# Patient Record
Sex: FEMALE | Race: OTHER | HISPANIC OR LATINO | URBAN - METROPOLITAN AREA
[De-identification: names, ages, dates, MRNs, and addresses within clinical notes are randomized per-mention and may not be internally consistent; named-entity substitution may affect disease eponyms.]

---

## 2017-09-23 ENCOUNTER — INPATIENT (INPATIENT)
Facility: HOSPITAL | Age: 82
LOS: 2 days | Discharge: HOME | DRG: 149 | End: 2017-09-26
Attending: INTERNAL MEDICINE | Admitting: INTERNAL MEDICINE
Payer: MEDICARE

## 2017-09-23 VITALS
TEMPERATURE: 99 F | SYSTOLIC BLOOD PRESSURE: 145 MMHG | RESPIRATION RATE: 20 BRPM | HEART RATE: 86 BPM | DIASTOLIC BLOOD PRESSURE: 70 MMHG | OXYGEN SATURATION: 96 %

## 2017-09-23 DIAGNOSIS — R42 DIZZINESS AND GIDDINESS: ICD-10-CM

## 2017-09-23 DIAGNOSIS — Z90.710 ACQUIRED ABSENCE OF BOTH CERVIX AND UTERUS: Chronic | ICD-10-CM

## 2017-09-23 DIAGNOSIS — Z90.12 ACQUIRED ABSENCE OF LEFT BREAST AND NIPPLE: Chronic | ICD-10-CM

## 2017-09-23 LAB
ALBUMIN SERPL ELPH-MCNC: 4.5 G/DL — SIGNIFICANT CHANGE UP (ref 3.3–5)
ALP SERPL-CCNC: 71 U/L — SIGNIFICANT CHANGE UP (ref 40–120)
ALT FLD-CCNC: 15 U/L RC — SIGNIFICANT CHANGE UP (ref 10–45)
ANION GAP SERPL CALC-SCNC: 16 MMOL/L — SIGNIFICANT CHANGE UP (ref 5–17)
APPEARANCE UR: CLEAR — SIGNIFICANT CHANGE UP
AST SERPL-CCNC: 16 U/L — SIGNIFICANT CHANGE UP (ref 10–40)
BACTERIA # UR AUTO: ABNORMAL /HPF
BASOPHILS # BLD AUTO: 0 K/UL — SIGNIFICANT CHANGE UP (ref 0–0.2)
BASOPHILS NFR BLD AUTO: 0.3 % — SIGNIFICANT CHANGE UP (ref 0–2)
BILIRUB SERPL-MCNC: 0.3 MG/DL — SIGNIFICANT CHANGE UP (ref 0.2–1.2)
BILIRUB UR-MCNC: NEGATIVE — SIGNIFICANT CHANGE UP
BUN SERPL-MCNC: 18 MG/DL — SIGNIFICANT CHANGE UP (ref 7–23)
CALCIUM SERPL-MCNC: 9.6 MG/DL — SIGNIFICANT CHANGE UP (ref 8.4–10.5)
CHLORIDE SERPL-SCNC: 103 MMOL/L — SIGNIFICANT CHANGE UP (ref 96–108)
CO2 SERPL-SCNC: 23 MMOL/L — SIGNIFICANT CHANGE UP (ref 22–31)
COLOR SPEC: YELLOW — SIGNIFICANT CHANGE UP
CREAT SERPL-MCNC: 0.72 MG/DL — SIGNIFICANT CHANGE UP (ref 0.5–1.3)
DIFF PNL FLD: NEGATIVE — SIGNIFICANT CHANGE UP
EOSINOPHIL # BLD AUTO: 0.3 K/UL — SIGNIFICANT CHANGE UP (ref 0–0.5)
EOSINOPHIL NFR BLD AUTO: 3.6 % — SIGNIFICANT CHANGE UP (ref 0–6)
GLUCOSE SERPL-MCNC: 143 MG/DL — HIGH (ref 70–99)
GLUCOSE UR QL: NEGATIVE — SIGNIFICANT CHANGE UP
HCT VFR BLD CALC: 42.3 % — SIGNIFICANT CHANGE UP (ref 34.5–45)
HGB BLD-MCNC: 14 G/DL — SIGNIFICANT CHANGE UP (ref 11.5–15.5)
KETONES UR-MCNC: NEGATIVE — SIGNIFICANT CHANGE UP
LEUKOCYTE ESTERASE UR-ACNC: ABNORMAL
LYMPHOCYTES # BLD AUTO: 2.2 K/UL — SIGNIFICANT CHANGE UP (ref 1–3.3)
LYMPHOCYTES # BLD AUTO: 24 % — SIGNIFICANT CHANGE UP (ref 13–44)
MCHC RBC-ENTMCNC: 28.9 PG — SIGNIFICANT CHANGE UP (ref 27–34)
MCHC RBC-ENTMCNC: 33.1 GM/DL — SIGNIFICANT CHANGE UP (ref 32–36)
MCV RBC AUTO: 87.3 FL — SIGNIFICANT CHANGE UP (ref 80–100)
MONOCYTES # BLD AUTO: 0.6 K/UL — SIGNIFICANT CHANGE UP (ref 0–0.9)
MONOCYTES NFR BLD AUTO: 7 % — SIGNIFICANT CHANGE UP (ref 2–14)
NEUTROPHILS # BLD AUTO: 5.9 K/UL — SIGNIFICANT CHANGE UP (ref 1.8–7.4)
NEUTROPHILS NFR BLD AUTO: 65.1 % — SIGNIFICANT CHANGE UP (ref 43–77)
NITRITE UR-MCNC: NEGATIVE — SIGNIFICANT CHANGE UP
PH UR: 6 — SIGNIFICANT CHANGE UP (ref 5–8)
PLATELET # BLD AUTO: 303 K/UL — SIGNIFICANT CHANGE UP (ref 150–400)
POTASSIUM SERPL-MCNC: 4.4 MMOL/L — SIGNIFICANT CHANGE UP (ref 3.5–5.3)
POTASSIUM SERPL-SCNC: 4.4 MMOL/L — SIGNIFICANT CHANGE UP (ref 3.5–5.3)
PROT SERPL-MCNC: 7.6 G/DL — SIGNIFICANT CHANGE UP (ref 6–8.3)
PROT UR-MCNC: NEGATIVE — SIGNIFICANT CHANGE UP
RBC # BLD: 4.84 M/UL — SIGNIFICANT CHANGE UP (ref 3.8–5.2)
RBC # FLD: 12.4 % — SIGNIFICANT CHANGE UP (ref 10.3–14.5)
RBC CASTS # UR COMP ASSIST: SIGNIFICANT CHANGE UP /HPF (ref 0–2)
SODIUM SERPL-SCNC: 142 MMOL/L — SIGNIFICANT CHANGE UP (ref 135–145)
SP GR SPEC: 1.01 — SIGNIFICANT CHANGE UP (ref 1.01–1.02)
UROBILINOGEN FLD QL: NEGATIVE — SIGNIFICANT CHANGE UP
WBC # BLD: 9 K/UL — SIGNIFICANT CHANGE UP (ref 3.8–10.5)
WBC # FLD AUTO: 9 K/UL — SIGNIFICANT CHANGE UP (ref 3.8–10.5)
WBC UR QL: SIGNIFICANT CHANGE UP /HPF (ref 0–5)

## 2017-09-23 PROCEDURE — 99285 EMERGENCY DEPT VISIT HI MDM: CPT | Mod: GC

## 2017-09-23 PROCEDURE — 70450 CT HEAD/BRAIN W/O DYE: CPT | Mod: 26

## 2017-09-23 RX ORDER — SODIUM CHLORIDE 9 MG/ML
1000 INJECTION INTRAMUSCULAR; INTRAVENOUS; SUBCUTANEOUS
Qty: 0 | Refills: 0 | Status: DISCONTINUED | OUTPATIENT
Start: 2017-09-23 | End: 2017-09-26

## 2017-09-23 RX ORDER — DEXTROSE 50 % IN WATER 50 %
25 SYRINGE (ML) INTRAVENOUS ONCE
Qty: 0 | Refills: 0 | Status: DISCONTINUED | OUTPATIENT
Start: 2017-09-23 | End: 2017-09-26

## 2017-09-23 RX ORDER — AMLODIPINE BESYLATE 2.5 MG/1
5 TABLET ORAL DAILY
Qty: 0 | Refills: 0 | Status: DISCONTINUED | OUTPATIENT
Start: 2017-09-23 | End: 2017-09-26

## 2017-09-23 RX ORDER — SODIUM CHLORIDE 9 MG/ML
500 INJECTION INTRAMUSCULAR; INTRAVENOUS; SUBCUTANEOUS ONCE
Qty: 0 | Refills: 0 | Status: COMPLETED | OUTPATIENT
Start: 2017-09-23 | End: 2017-09-23

## 2017-09-23 RX ORDER — CLORAZEPATE DIPOTASSIUM 3.75 MG
7.5 TABLET ORAL THREE TIMES A DAY
Qty: 0 | Refills: 0 | Status: DISCONTINUED | OUTPATIENT
Start: 2017-09-23 | End: 2017-09-26

## 2017-09-23 RX ORDER — ONDANSETRON 8 MG/1
4 TABLET, FILM COATED ORAL ONCE
Qty: 0 | Refills: 0 | Status: COMPLETED | OUTPATIENT
Start: 2017-09-23 | End: 2017-09-23

## 2017-09-23 RX ORDER — HEPARIN SODIUM 5000 [USP'U]/ML
5000 INJECTION INTRAVENOUS; SUBCUTANEOUS EVERY 12 HOURS
Qty: 0 | Refills: 0 | Status: DISCONTINUED | OUTPATIENT
Start: 2017-09-23 | End: 2017-09-26

## 2017-09-23 RX ORDER — ASPIRIN/CALCIUM CARB/MAGNESIUM 324 MG
81 TABLET ORAL DAILY
Qty: 0 | Refills: 0 | Status: DISCONTINUED | OUTPATIENT
Start: 2017-09-23 | End: 2017-09-26

## 2017-09-23 RX ORDER — CEFTRIAXONE 500 MG/1
1 INJECTION, POWDER, FOR SOLUTION INTRAMUSCULAR; INTRAVENOUS ONCE
Qty: 0 | Refills: 0 | Status: COMPLETED | OUTPATIENT
Start: 2017-09-23 | End: 2017-09-23

## 2017-09-23 RX ORDER — INFLUENZA VIRUS VACCINE 15; 15; 15; 15 UG/.5ML; UG/.5ML; UG/.5ML; UG/.5ML
0.5 SUSPENSION INTRAMUSCULAR ONCE
Qty: 0 | Refills: 0 | Status: DISCONTINUED | OUTPATIENT
Start: 2017-09-23 | End: 2017-09-26

## 2017-09-23 RX ORDER — INSULIN LISPRO 100/ML
VIAL (ML) SUBCUTANEOUS
Qty: 0 | Refills: 0 | Status: DISCONTINUED | OUTPATIENT
Start: 2017-09-23 | End: 2017-09-26

## 2017-09-23 RX ORDER — SIMVASTATIN 20 MG/1
20 TABLET, FILM COATED ORAL AT BEDTIME
Qty: 0 | Refills: 0 | Status: DISCONTINUED | OUTPATIENT
Start: 2017-09-23 | End: 2017-09-26

## 2017-09-23 RX ORDER — LISINOPRIL 2.5 MG/1
5 TABLET ORAL DAILY
Qty: 0 | Refills: 0 | Status: DISCONTINUED | OUTPATIENT
Start: 2017-09-23 | End: 2017-09-26

## 2017-09-23 RX ORDER — MECLIZINE HCL 12.5 MG
12.5 TABLET ORAL ONCE
Qty: 0 | Refills: 0 | Status: COMPLETED | OUTPATIENT
Start: 2017-09-23 | End: 2017-09-23

## 2017-09-23 RX ORDER — SODIUM CHLORIDE 9 MG/ML
1000 INJECTION, SOLUTION INTRAVENOUS
Qty: 0 | Refills: 0 | Status: DISCONTINUED | OUTPATIENT
Start: 2017-09-23 | End: 2017-09-26

## 2017-09-23 RX ORDER — GLUCAGON INJECTION, SOLUTION 0.5 MG/.1ML
1 INJECTION, SOLUTION SUBCUTANEOUS ONCE
Qty: 0 | Refills: 0 | Status: DISCONTINUED | OUTPATIENT
Start: 2017-09-23 | End: 2017-09-26

## 2017-09-23 RX ORDER — MECLIZINE HCL 12.5 MG
25 TABLET ORAL DAILY
Qty: 0 | Refills: 0 | Status: DISCONTINUED | OUTPATIENT
Start: 2017-09-23 | End: 2017-09-26

## 2017-09-23 RX ORDER — DEXTROSE 50 % IN WATER 50 %
12.5 SYRINGE (ML) INTRAVENOUS ONCE
Qty: 0 | Refills: 0 | Status: DISCONTINUED | OUTPATIENT
Start: 2017-09-23 | End: 2017-09-26

## 2017-09-23 RX ORDER — DEXTROSE 50 % IN WATER 50 %
1 SYRINGE (ML) INTRAVENOUS ONCE
Qty: 0 | Refills: 0 | Status: DISCONTINUED | OUTPATIENT
Start: 2017-09-23 | End: 2017-09-26

## 2017-09-23 RX ADMIN — SIMVASTATIN 20 MILLIGRAM(S): 20 TABLET, FILM COATED ORAL at 21:04

## 2017-09-23 RX ADMIN — SODIUM CHLORIDE 500 MILLILITER(S): 9 INJECTION INTRAMUSCULAR; INTRAVENOUS; SUBCUTANEOUS at 12:21

## 2017-09-23 RX ADMIN — Medication 7.5 MILLIGRAM(S): at 21:56

## 2017-09-23 RX ADMIN — Medication 12.5 MILLIGRAM(S): at 12:16

## 2017-09-23 RX ADMIN — CEFTRIAXONE 100 GRAM(S): 500 INJECTION, POWDER, FOR SOLUTION INTRAMUSCULAR; INTRAVENOUS at 21:05

## 2017-09-23 NOTE — ED PROVIDER NOTE - ATTENDING CONTRIBUTION TO CARE
Private Physician Justyna Rizo ([place])  87y female pmh DM,HTN,HLD,Cancer breast sp lumpectomy/chemo/rt 1999, Pt comes to ed complains of chronic dizzyness  for 2y on antivert. Two days ago worsened with off balnace. No nausea and vomiting, fever chills. cough, trauma, decreased vison.  Now complains of diff walking. PE WDWN  NAD normocephalic atraumatic no nsytagmus, neck supple chest clear anterior & posterior abd soft +bs, neruo no focal defects. cn2-12 intact. pain light touch intact power 5/5 all extr  Jorge Hernandes MD, Facep

## 2017-09-23 NOTE — CONSULT NOTE ADULT - ASSESSMENT
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 She comes into the ED due to dizziness and unsteady gait. She has a history of vertigo like symptoms and dizziness for which she takes meclizine. Over the past three days the meclizine has been unable to control her symptoms. She reports tinnitus for months, veritigo for the past month and feeling unsteady for the past three days. On physical exam, the pt is unsteady on her feet (she uses a walker however left it at home). There are no other focal findings except decreased hearing in the left hear. Denies double vision, drop attacks. Labs are WNL except urinalysis positive for leukocyte esterase, few bacteria and 11-25 wbcs. CT head is negative for any acute findings (no changes since 2012). DDX - Vertigo exacerbated by UTI vs vertebrobasilar ischemia (less likely as there are no other focal findings, and deficits, also pt does not have overt vertiginous signs "room spinning"). Given pt's history of breat cancer and age she is at risk for CVA.     Recommendations:  If pt improves on IVF, Abx and meclizine and she is able to ambulate - she can be DC'ed and follow up for outpatient head and neck imaging this week in the outpatient neurology clinic.    If no improvement, pt can be observed in CDU for MRI brain and MRA head and neck.    If pt is admitted would get MRI brain without, MRA head without and MRA neck with blanka while inpatient.   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 She comes into the ED due to dizziness and unsteady gait. She has a history of vertigo like symptoms and dizziness for which she takes meclizine. Over the past three days the meclizine has been unable to control her symptoms. She reports tinnitus for months, veritigo for the past month and feeling unsteady for the past three days. On physical exam, the pt is unsteady on her feet (she uses a walker however left it at home). There are no other focal findings except decreased hearing in the left hear. Denies double vision, drop attacks. Labs are WNL except urinalysis positive for leukocyte esterase, few bacteria and 11-25 wbcs. CT head is negative for any acute findings (no changes since 2012). DDX - Vertigo exacerbated by UTI vs vertebrobasilar ischemia (less likely as there are no other focal findings, and deficits, also pt does not have overt vertiginous signs "room spinning"). However, given pt's history of breast cancer and age she is at risk for CVA.     Recommendations:  If pt improves on IVF, Abx and meclizine and she is able to ambulate - she can be DC'ed and follow up for outpatient head and neck imaging this week in the outpatient neurology clinic.    If no improvement, pt can be observed in CDU for MRI brain and MRA head and neck.    If pt is admitted would get MRI brain without, MRA head without and MRA neck with blanka while inpatient.   Abx for UTI, Meclizine PRN, IVF  Fall precautions

## 2017-09-23 NOTE — ED PROVIDER NOTE - PROGRESS NOTE DETAILS
Endorsed To Dr Damien Hernandes MD, Facep attending Damien: Received signout from Dr. Hernandes. Pt with dizziness and inability to ambulate. Pending neurology evaluation. Neuro susp UTI has exacerbated chronic vertigo, does not susp brain ischemia

## 2017-09-23 NOTE — ED PROVIDER NOTE - OBJECTIVE STATEMENT
This patient is a 87y female w PMHx breast cancer with full remission and DM on metformin, presenting with 3 days of dizziness. She states that she has been unable to keep her balance while standing or sitting for the last 3 days. She has also had tinnitus bilaterally for the last several months. This patient is a 87y female w PMHx breast cancer with full remission and DM on metformin, presenting with 3 days of dizziness. She states that she has been unable to keep her balance while standing or sitting for the last 3 days. She has also had tinnitus bilaterally for the last several months. She has been slightly nauseous with her imbalance but has not had any emesis. This patient is a 87y female w PMHx breast cancer with full remission, DM on metformin, and chronic vertigo usually controlled with antivert, now presenting with 3 days of severe dizziness. She states that she has been unable to keep her balance while standing or sitting for the last 3 days. She has also had tinnitus bilaterally for the last several months. She has been slightly nauseous with her imbalance but has not had any emesis.

## 2017-09-23 NOTE — H&P ADULT - HISTORY OF PRESENT ILLNESS
This patient is a 87y female w PMHx breast cancer with full remission, and chronic vertigo usually controlled with antivert, now presenting with 3 days of severe dizziness. She states that she has been unable to keep her balance while standing or sitting for the last 3 days. She has also had tinnitus bilaterally for the last several months. She has had  nausea/no vomiting  ?dysuria couple of days ago

## 2017-09-23 NOTE — ED ADULT NURSE NOTE - PMH
Breast cancer    Diabetes  type II  Hyperlipidemia    Hypertension    Metastatic carcinoma    Pulmonary embolism  2012  Tuberculosis  1956

## 2017-09-23 NOTE — ED ADULT NURSE NOTE - CHPI ED SYMPTOMS NEG
no chills/no decreased eating/drinking/no weakness/no fever/no tingling/no nausea/no numbness/no vomiting/no pain

## 2017-09-23 NOTE — CONSULT NOTE ADULT - SUBJECTIVE AND OBJECTIVE BOX
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 She states that she has been feeling dizzy and unable to keep her balance while walking with a walker.  She described the dizziness as feeling off balance, denies room spinning sensation. She has a long standing history of vertigo like symptoms for which she takes meclizine. She has had to take Meclizine every day for the past month. Over the last three days the meclizine has not been able to control her symptoms therefore she came into the ED.     ROS - positive for tinnitus bilaterally occurring for the last several months), left arm and shoulder pain (2/2 surgery). Denies vision changes, drop attacks, sensory changes, weakness.     Denies – SOB, CP, palpitations, headache, weakness, numbness, vomiting, fever/chills and urinary changes    Vital Signs Last 24 Hrs  T(C): 36.7 (23 Sep 2017 15:35), Max: 37.2 (23 Sep 2017 11:03)  T(F): 98 (23 Sep 2017 15:35), Max: 98.9 (23 Sep 2017 11:03)  HR: 74 (23 Sep 2017 15:35) (74 - 86)  BP: 142/72 (23 Sep 2017 15:35) (142/72 - 152/89)  BP(mean): --  RR: 18 (23 Sep 2017 15:35) (18 - 20)  SpO2: 99% (23 Sep 2017 15:35) (96% - 99%)    Physical Exam  General – Lying in bed, tremors on the moth and eyelids, NAD  Neck is soft and supple, no bruits   Heart - S1S2 present  Neurological:  MS – AAOX3, attention in tact, no word finding difficulties, naming in tact  CN – EOMI, PERRL, decreased hearing in the left ear, no nystagmus in any direction, face symmetric, uvula midline  Motor – 4+/5 for given age (no lateralizing weakness) throughout  Sensory – no sensory deficits throughout, light touch and temperature in tat throughout   Coordination – FTN in tact BL, HTS in tact BL.   Gait – at baseline pt’s uses a walker – gait is unsteady and hesitant.    Labs                      14.0   9.0   )-----------( 303      ( 23 Sep 2017 12:02 )             42.3   09-23    142  |  103  |  18  ----------------------------<  143<H>  4.4   |  23  |  0.72    Ca    9.6      23 Sep 2017 12:02    TPro  7.6  /  Alb  4.5  /  TBili  0.3  /  DBili  x   /  AST  16  /  ALT  15  /  AlkPhos  71  09-23    Imaging:  CT head - No acute intracranial hemorrhage, mass effect or acute territorial infarction. Unchanged brain CT when compared to 10/20/2012.

## 2017-09-23 NOTE — H&P ADULT - NSHPLABSRESULTS_GEN_ALL_CORE
14.0   9.0   )-----------( 303      ( 23 Sep 2017 12:02 )             42.3           142  |  103  |  18  ----------------------------<  143<H>  4.4   |  23  |  0.72    Ca    9.6      23 Sep 2017 12:02    TPro  7.6  /  Alb  4.5  /  TBili  0.3  /  DBili  x   /  AST  16  /  ALT  15  /  AlkPhos  71                Urinalysis Basic - ( 23 Sep 2017 12:28 )    Color: Yellow / Appearance: Clear / S.010 / pH: x  Gluc: x / Ketone: Negative  / Bili: Negative / Urobili: Negative   Blood: x / Protein: Negative / Nitrite: Negative   Leuk Esterase: Moderate / RBC: 0-2 /HPF / WBC 11-25 /HPF   Sq Epi: x / Non Sq Epi: x / Bacteria: Few /HPF            Lactate Trend            CAPILLARY BLOOD GLUCOSE  138 (23 Sep 2017 11:57)

## 2017-09-23 NOTE — ED PROVIDER NOTE - MEDICAL DECISION MAKING DETAILS
87y female with PMHx DM, presenting with 3 days of vertigo ddx electrolyte abnormality vs meniere's disease vs BPPV. BPPV less likely, story doesn't fit. Glucose 137 so hypoglycemia unlikely. Will get labs. 87y female with PMHx DM, presenting with 3 days of vertigo ddx electrolyte abnormality vs meniere's disease vs BPPV. BPPV less likely, story doesn't fit. Glucose 137 so hypoglycemia unlikely. Will get labs.  Neuro does not susp intracranial vascular origin, head ct normal, UTI on labs, susp this exacerbated vertigo. Will admit to Dr. Martines due to inability to ambulate.

## 2017-09-23 NOTE — ED ADULT NURSE NOTE - OBJECTIVE STATEMENT
87 year old female presented to ED with c/o of dizziness since Thursday. Pt fell on Thursday because of dizziness, did not fall to the floor. Pt did not hit head, no LOC, no neck pain. Pt denies pain. Pt states 'I have ringing in my ears'. Pt has hx of breast cancer and diabetes. Pt denies CP, SOB, nausea/vomiting, numbness/tingling, fever, cough, chills, headache. Pt a&ox3, lung sounds clear, heart rate regular, abdomen soft nontender nondistended to palp. Skin intact. IV in right AC 20G and patent. Pt currently resting in bed with daughter at bedside. Will continue to monitor and assess while offering support and reassurance.

## 2017-09-23 NOTE — H&P ADULT - ASSESSMENT
pt w/ worsening dizziness /  vertigo  neuro eval noted  mri/ mra ordered  iv fluid   check urine culture   id eval   pt  dvt proph

## 2017-09-24 DIAGNOSIS — R82.90 UNSPECIFIED ABNORMAL FINDINGS IN URINE: ICD-10-CM

## 2017-09-24 DIAGNOSIS — R42 DIZZINESS AND GIDDINESS: ICD-10-CM

## 2017-09-24 LAB
HBA1C BLD-MCNC: 7.6 % — HIGH (ref 4–5.6)
TSH SERPL-MCNC: 3.54 UIU/ML — SIGNIFICANT CHANGE UP (ref 0.27–4.2)

## 2017-09-24 PROCEDURE — 99222 1ST HOSP IP/OBS MODERATE 55: CPT

## 2017-09-24 RX ADMIN — Medication 25 MILLIGRAM(S): at 11:43

## 2017-09-24 RX ADMIN — Medication 7.5 MILLIGRAM(S): at 05:14

## 2017-09-24 RX ADMIN — Medication 81 MILLIGRAM(S): at 11:49

## 2017-09-24 RX ADMIN — Medication 7.5 MILLIGRAM(S): at 21:20

## 2017-09-24 RX ADMIN — SODIUM CHLORIDE 75 MILLILITER(S): 9 INJECTION INTRAMUSCULAR; INTRAVENOUS; SUBCUTANEOUS at 11:43

## 2017-09-24 RX ADMIN — HEPARIN SODIUM 5000 UNIT(S): 5000 INJECTION INTRAVENOUS; SUBCUTANEOUS at 17:03

## 2017-09-24 RX ADMIN — SIMVASTATIN 20 MILLIGRAM(S): 20 TABLET, FILM COATED ORAL at 21:22

## 2017-09-24 RX ADMIN — Medication 2: at 11:52

## 2017-09-24 RX ADMIN — AMLODIPINE BESYLATE 5 MILLIGRAM(S): 2.5 TABLET ORAL at 05:13

## 2017-09-24 RX ADMIN — HEPARIN SODIUM 5000 UNIT(S): 5000 INJECTION INTRAVENOUS; SUBCUTANEOUS at 05:14

## 2017-09-24 RX ADMIN — SODIUM CHLORIDE 75 MILLILITER(S): 9 INJECTION INTRAMUSCULAR; INTRAVENOUS; SUBCUTANEOUS at 17:02

## 2017-09-24 RX ADMIN — LISINOPRIL 5 MILLIGRAM(S): 2.5 TABLET ORAL at 05:13

## 2017-09-24 NOTE — PROGRESS NOTE ADULT - SUBJECTIVE AND OBJECTIVE BOX
CHIEF COMPLAINT:Patient without new complaints         PAST MEDICAL & SURGICAL HISTORY:  Pulmonary embolism: 2012  Tuberculosis: 1956  Hyperlipidemia  Diabetes: type II  Hypertension  Metastatic carcinoma  Breast cancer  H/O left mastectomy  H/O abdominal hysterectomy          REVIEW OF SYSTEMS:  CONSTITUTIONAL: No fever, weight loss, or fatigue  EYES: No eye pain, visual disturbances, or discharge  NECK: No pain or stiffness  RESPIRATORY: No cough, wheezing, chills or hemoptysis; No Shortness of Breath  CARDIOVASCULAR: No chest pain, palpitations,   GASTROINTESTINAL: No abdominal or epigastric pain. No nausea, vomiting, or hematemesis; No diarrhea or constipation. No melena or hematochezia.  GENITOURINARY: No dysuria, frequency, hematuria, or incontinence at present   NEUROLOGICAL: No headaches, memory loss, loss of strength, numbness, or tremors/ + dizziness  SKIN: No itching, burning, rashes, or lesions       Medications:  MEDICATIONS  (STANDING):  aspirin  chewable 81 milliGRAM(s) Oral daily  meclizine 25 milliGRAM(s) Oral daily  simvastatin 20 milliGRAM(s) Oral at bedtime  clorazepate 7.5 milliGRAM(s) Oral three times a day  amLODIPine   Tablet 5 milliGRAM(s) Oral daily  lisinopril 5 milliGRAM(s) Oral daily  sodium chloride 0.9%. 1000 milliLiter(s) (75 mL/Hr) IV Continuous <Continuous>  influenza   Vaccine 0.5 milliLiter(s) IntraMuscular once  insulin lispro (HumaLOG) corrective regimen sliding scale   SubCutaneous three times a day before meals  dextrose 5%. 1000 milliLiter(s) (50 mL/Hr) IV Continuous <Continuous>  dextrose 50% Injectable 12.5 Gram(s) IV Push once  dextrose 50% Injectable 25 Gram(s) IV Push once  dextrose 50% Injectable 25 Gram(s) IV Push once  heparin  Injectable 5000 Unit(s) SubCutaneous every 12 hours    MEDICATIONS  (PRN):  dextrose Gel 1 Dose(s) Oral once PRN Blood Glucose LESS THAN 70 milliGRAM(s)/deciliter  glucagon  Injectable 1 milliGRAM(s) IntraMuscular once PRN Glucose LESS THAN 70 milligrams/deciliter    	    PHYSICAL EXAM:  T(C): 36.6 (09-24-17 @ 07:50), Max: 37.2 (09-23-17 @ 11:03)  HR: 73 (09-24-17 @ 07:50) (71 - 86)  BP: 139/68 (09-24-17 @ 07:50) (138/70 - 168/72)  RR: 18 (09-24-17 @ 07:50) (18 - 20)  SpO2: 96% (09-24-17 @ 07:50) (94% - 99%)  Wt(kg): --  I&O's Summary      Appearance: Normal	  HEENT:   Normal oral mucosa, PERRL, EOMI	  Lymphatic: No lymphadenopathy  Cardiovascular: Normal S1 S2, No JVD, No murmurs, No edema  Respiratory: Lungs clear to auscultation	  Psychiatry: A & O x 3, Mood & affect appropriate  Gastrointestinal:  Soft, Non-tender, + BS	  Skin: No rashes, No ecchymoses, No cyanosis	  Neurologic: Non-focal  Extremities: Normal range of motion, No clubbing, cyanosis or edema  Vascular: Peripheral pulses palpable 2+ bilaterally    TELEMETRY: 	    ECG:  	  RADIOLOGY:  OTHER: 	  	  LABS:	 	    CARDIAC MARKERS:                                14.0   9.0   )-----------( 303      ( 23 Sep 2017 12:02 )             42.3     09-23    142  |  103  |  18  ----------------------------<  143<H>  4.4   |  23  |  0.72    Ca    9.6      23 Sep 2017 12:02    TPro  7.6  /  Alb  4.5  /  TBili  0.3  /  DBili  x   /  AST  16  /  ALT  15  /  AlkPhos  71  09-23    proBNP:   Lipid Profile:   HgA1c:   TSH:

## 2017-09-24 NOTE — CONSULT NOTE ADULT - ATTENDING COMMENTS
Darwin Hernandez  Attending Physician   Division of Infectious Disease  Pager #[phone]  After 5pm/weekend #[phone]

## 2017-09-24 NOTE — PROGRESS NOTE ADULT - SUBJECTIVE AND OBJECTIVE BOX
Neurology Attending  see resident note for details, agree with hx,physical and plan as outlined, case d/w resident. Pt with dizziness, persistent, nonfocal neuro exam  Plan  1 MRI brain  2. PT

## 2017-09-24 NOTE — CONSULT NOTE ADULT - SUBJECTIVE AND OBJECTIVE BOX
OLU DUTTA 87y Female  [id number]    Patient is a 87y old  Female who presents with a chief complaint of     HPI:  This patient is a 87y female w PMHx breast cancer with full remission, and chronic vertigo usually controlled with antivert, now presenting with 3 days of severe dizziness. She states that she has been unable to keep her balance while standing or sitting for the last 3 days. She has also had tinnitus bilaterally for the last several months. She has had  nausea/no vomiting  ?dysuria couple of days ago (23 Sep 2017 19:24)      PAST MEDICAL & SURGICAL HISTORY:  Pulmonary embolism:   Tuberculosis:   Hyperlipidemia  Diabetes: type II  Hypertension  Metastatic carcinoma  Breast cancer  H/O left mastectomy  H/O abdominal hysterectomy      Allergies    No Known Allergies    Intolerances        ANTIMICROBIALS:      MEDICATIONS  (STANDING):  aspirin  chewable 81 milliGRAM(s) Oral daily  meclizine 25 milliGRAM(s) Oral daily  simvastatin 20 milliGRAM(s) Oral at bedtime  clorazepate 7.5 milliGRAM(s) Oral three times a day  amLODIPine   Tablet 5 milliGRAM(s) Oral daily  lisinopril 5 milliGRAM(s) Oral daily  sodium chloride 0.9%. 1000 milliLiter(s) (75 mL/Hr) IV Continuous <Continuous>  influenza   Vaccine 0.5 milliLiter(s) IntraMuscular once  insulin lispro (HumaLOG) corrective regimen sliding scale   SubCutaneous three times a day before meals  dextrose 5%. 1000 milliLiter(s) (50 mL/Hr) IV Continuous <Continuous>  dextrose 50% Injectable 12.5 Gram(s) IV Push once  dextrose 50% Injectable 25 Gram(s) IV Push once  dextrose 50% Injectable 25 Gram(s) IV Push once  heparin  Injectable 5000 Unit(s) SubCutaneous every 12 hours      Social History  Smoking:  Etoh:  Drug use:      FAMILY HISTORY:  No pertinent family history in first degree relatives      Vital Signs Last 24 Hrs  T(C): 36.6 (24 Sep 2017 01:56), Max: 37.2 (23 Sep 2017 11:03)  T(F): 97.9 (24 Sep 2017 01:56), Max: 98.9 (23 Sep 2017 11:03)  HR: 80 (24 Sep 2017 01:56) (71 - 86)  BP: 168/72 (24 Sep 2017 01:56) (138/70 - 168/72)  BP(mean): --  RR: 18 (24 Sep 2017 01:56) (18 - 20)  SpO2: 96% (24 Sep 2017 01:56) (94% - 99%)    CBC Full  -  ( 23 Sep 2017 12:02 )  WBC Count : 9.0 K/uL  Hemoglobin : 14.0 g/dL  Hematocrit : 42.3 %  Platelet Count - Automated : 303 K/uL  Mean Cell Volume : 87.3 fl  Mean Cell Hemoglobin : 28.9 pg  Mean Cell Hemoglobin Concentration : 33.1 gm/dL  Auto Neutrophil # : 5.9 K/uL  Auto Lymphocyte # : 2.2 K/uL  Auto Monocyte # : 0.6 K/uL  Auto Eosinophil # : 0.3 K/uL  Auto Basophil # : 0.0 K/uL  Auto Neutrophil % : 65.1 %  Auto Lymphocyte % : 24.0 %  Auto Monocyte % : 7.0 %  Auto Eosinophil % : 3.6 %  Auto Basophil % : 0.3 %        142  |  103  |  18  ----------------------------<  143<H>  4.4   |  23  |  0.72    Ca    9.6      23 Sep 2017 12:02    TPro  7.6  /  Alb  4.5  /  TBili  0.3  /  DBili  x   /  AST  16  /  ALT  15  /  AlkPhos  71      LIVER FUNCTIONS - ( 23 Sep 2017 12:02 )  Alb: 4.5 g/dL / Pro: 7.6 g/dL / ALK PHOS: 71 U/L / ALT: 15 U/L RC / AST: 16 U/L / GGT: x           Urinalysis Basic - ( 23 Sep 2017 12:28 )    Color: Yellow / Appearance: Clear / S.010 / pH: x  Gluc: x / Ketone: Negative  / Bili: Negative / Urobili: Negative   Blood: x / Protein: Negative / Nitrite: Negative   Leuk Esterase: Moderate / RBC: 0-2 /HPF / WBC 11-25 /HPF   Sq Epi: x / Non Sq Epi: x / Bacteria: Few /HPF        MICROBIOLOGY:          v            RADIOLOGY    CT Head No Cont (17 @ 14:05) >  No acute intracranial hemorrhage, mass effect or acute territorial   infarction. Unchanged brain CT when compared to 10/20/2012. DUTTALENOLU 87y Female  MRN-[id number]    Patient is a 87y old  Female who presents with a chief complaint of     HPI:  This patient is a 87y female w PMHx breast cancer with full remission, and chronic vertigo usually controlled with antivert, now presenting with 3 days of severe dizziness. She states that she has been unable to keep her balance while standing or sitting for the last 3 days. She has also had tinnitus bilaterally for the last several months. She has had  nausea/no vomiting  ?dysuria couple of days ago (23 Sep 2017 19:24)    c/o dizziness when getting up    No dysuria or hematuria      PAST MEDICAL & SURGICAL HISTORY:  Pulmonary embolism:   Tuberculosis:   Hyperlipidemia  Diabetes: type II  Hypertension  Metastatic carcinoma  Breast cancer  H/O left mastectomy  H/O abdominal hysterectomy      Allergies    No Known Allergies    Intolerances        ANTIMICROBIALS:  CTX x 1 dose given    MEDICATIONS  (STANDING):  aspirin  chewable 81 milliGRAM(s) Oral daily  meclizine 25 milliGRAM(s) Oral daily  simvastatin 20 milliGRAM(s) Oral at bedtime  clorazepate 7.5 milliGRAM(s) Oral three times a day  amLODIPine   Tablet 5 milliGRAM(s) Oral daily  lisinopril 5 milliGRAM(s) Oral daily  sodium chloride 0.9%. 1000 milliLiter(s) (75 mL/Hr) IV Continuous <Continuous>  influenza   Vaccine 0.5 milliLiter(s) IntraMuscular once  insulin lispro (HumaLOG) corrective regimen sliding scale   SubCutaneous three times a day before meals  dextrose 5%. 1000 milliLiter(s) (50 mL/Hr) IV Continuous <Continuous>  dextrose 50% Injectable 12.5 Gram(s) IV Push once  dextrose 50% Injectable 25 Gram(s) IV Push once  dextrose 50% Injectable 25 Gram(s) IV Push once  heparin  Injectable 5000 Unit(s) SubCutaneous every 12 hours      Social History  Smoking: no  Etoh: none  Drug use: no  lives with daughter at home      FAMILY HISTORY:  No pertinent family history in first degree relatives      Vital Signs Last 24 Hrs  T(C): 36.6 (24 Sep 2017 01:56), Max: 37.2 (23 Sep 2017 11:03)  T(F): 97.9 (24 Sep 2017 01:56), Max: 98.9 (23 Sep 2017 11:03)  HR: 80 (24 Sep 2017 01:56) (71 - 86)  BP: 168/72 (24 Sep 2017 01:56) (138/70 - 168/72)  BP(mean): --  RR: 18 (24 Sep 2017 01:56) (18 - 20)  SpO2: 96% (24 Sep 2017 01:56) (94% - 99%)    CBC Full  -  ( 23 Sep 2017 12:02 )  WBC Count : 9.0 K/uL  Hemoglobin : 14.0 g/dL  Hematocrit : 42.3 %  Platelet Count - Automated : 303 K/uL  Mean Cell Volume : 87.3 fl  Mean Cell Hemoglobin : 28.9 pg  Mean Cell Hemoglobin Concentration : 33.1 gm/dL  Auto Neutrophil # : 5.9 K/uL  Auto Lymphocyte # : 2.2 K/uL  Auto Monocyte # : 0.6 K/uL  Auto Eosinophil # : 0.3 K/uL  Auto Basophil # : 0.0 K/uL  Auto Neutrophil % : 65.1 %  Auto Lymphocyte % : 24.0 %  Auto Monocyte % : 7.0 %  Auto Eosinophil % : 3.6 %  Auto Basophil % : 0.3 %        142  |  103  |  18  ----------------------------<  143<H>  4.4   |  23  |  0.72    Ca    9.6      23 Sep 2017 12:02    TPro  7.6  /  Alb  4.5  /  TBili  0.3  /  DBili  x   /  AST  16  /  ALT  15  /  AlkPhos  71  0923    LIVER FUNCTIONS - ( 23 Sep 2017 12:02 )  Alb: 4.5 g/dL / Pro: 7.6 g/dL / ALK PHOS: 71 U/L / ALT: 15 U/L RC / AST: 16 U/L / GGT: x           Urinalysis Basic - ( 23 Sep 2017 12:28 )    Color: Yellow / Appearance: Clear / S.010 / pH: x  Gluc: x / Ketone: Negative  / Bili: Negative / Urobili: Negative   Blood: x / Protein: Negative / Nitrite: Negative   Leuk Esterase: Moderate / RBC: 0-2 /HPF / WBC 11-25 /HPF   Sq Epi: x / Non Sq Epi: x / Bacteria: Few /HPF        MICROBIOLOGY:      RADIOLOGY    CT Head No Cont (17 @ 14:05) >  No acute intracranial hemorrhage, mass effect or acute territorial   infarction. Unchanged brain CT when compared to 10/20/2012.

## 2017-09-25 LAB
CULTURE RESULTS: NO GROWTH — SIGNIFICANT CHANGE UP
SPECIMEN SOURCE: SIGNIFICANT CHANGE UP
TROPONIN T SERPL-MCNC: <0.01 NG/ML — SIGNIFICANT CHANGE UP (ref 0–0.06)

## 2017-09-25 PROCEDURE — 99232 SBSQ HOSP IP/OBS MODERATE 35: CPT

## 2017-09-25 RX ADMIN — Medication 25 MILLIGRAM(S): at 11:46

## 2017-09-25 RX ADMIN — Medication 2: at 09:24

## 2017-09-25 RX ADMIN — LISINOPRIL 5 MILLIGRAM(S): 2.5 TABLET ORAL at 06:29

## 2017-09-25 RX ADMIN — Medication 2: at 18:03

## 2017-09-25 RX ADMIN — HEPARIN SODIUM 5000 UNIT(S): 5000 INJECTION INTRAVENOUS; SUBCUTANEOUS at 18:03

## 2017-09-25 RX ADMIN — SIMVASTATIN 20 MILLIGRAM(S): 20 TABLET, FILM COATED ORAL at 20:26

## 2017-09-25 RX ADMIN — AMLODIPINE BESYLATE 5 MILLIGRAM(S): 2.5 TABLET ORAL at 06:29

## 2017-09-25 RX ADMIN — Medication 81 MILLIGRAM(S): at 11:46

## 2017-09-25 RX ADMIN — HEPARIN SODIUM 5000 UNIT(S): 5000 INJECTION INTRAVENOUS; SUBCUTANEOUS at 06:30

## 2017-09-25 NOTE — PHYSICAL THERAPY INITIAL EVALUATION ADULT - ADDITIONAL COMMENTS
pt lives in a house with her daughter with a chair lift to second floor. PTA she was amb with a RW. she has a HHA 7hr per day

## 2017-09-25 NOTE — PROGRESS NOTE ADULT - SUBJECTIVE AND OBJECTIVE BOX
CHIEF COMPLAINT:Patient  dizzy when getting up     	        PAST MEDICAL & SURGICAL HISTORY:  Pulmonary embolism: 2012  Tuberculosis: 1956  Hyperlipidemia  Diabetes: type II  Hypertension  Metastatic carcinoma  Breast cancer  H/O left mastectomy  H/O abdominal hysterectomy          REVIEW OF SYSTEMS:  no cp or sob    no n/v   no chills   no h/as   no abd pain   no visual changes    Medications:  MEDICATIONS  (STANDING):  aspirin  chewable 81 milliGRAM(s) Oral daily  meclizine 25 milliGRAM(s) Oral daily  simvastatin 20 milliGRAM(s) Oral at bedtime  clorazepate 7.5 milliGRAM(s) Oral three times a day  amLODIPine   Tablet 5 milliGRAM(s) Oral daily  lisinopril 5 milliGRAM(s) Oral daily  sodium chloride 0.9%. 1000 milliLiter(s) (75 mL/Hr) IV Continuous <Continuous>  influenza   Vaccine 0.5 milliLiter(s) IntraMuscular once  insulin lispro (HumaLOG) corrective regimen sliding scale   SubCutaneous three times a day before meals  dextrose 5%. 1000 milliLiter(s) (50 mL/Hr) IV Continuous <Continuous>  dextrose 50% Injectable 12.5 Gram(s) IV Push once  dextrose 50% Injectable 25 Gram(s) IV Push once  dextrose 50% Injectable 25 Gram(s) IV Push once  heparin  Injectable 5000 Unit(s) SubCutaneous every 12 hours    MEDICATIONS  (PRN):  dextrose Gel 1 Dose(s) Oral once PRN Blood Glucose LESS THAN 70 milliGRAM(s)/deciliter  glucagon  Injectable 1 milliGRAM(s) IntraMuscular once PRN Glucose LESS THAN 70 milligrams/deciliter    	    PHYSICAL EXAM:  T(C): 37.2 (09-25-17 @ 09:11), Max: 37.2 (09-25-17 @ 09:11)  HR: 89 (09-25-17 @ 09:11) (71 - 89)  BP: 128/76 (09-25-17 @ 09:11) (128/76 - 146/78)  RR: 18 (09-25-17 @ 09:11) (18 - 18)  SpO2: 96% (09-25-17 @ 09:11) (94% - 97%)  Wt(kg): --  I&O's Summary    24 Sep 2017 07:01  -  25 Sep 2017 07:00  --------------------------------------------------------  IN: 1320 mL / OUT: 2600 mL / NET: -1280 mL        Appearance: Normal	  HEENT:   Normal oral mucosa, PERRL, EOMI	  Lymphatic: No lymphadenopathy  Cardiovascular: Normal S1 S2, No JVD, No murmurs, No edema  Respiratory: Lungs clear to auscultation	  Psychiatry: A & O x 3, Mood & affect appropriate  Gastrointestinal:  Soft, Non-tender, + BS	  Skin: No rashes, No ecchymoses, No cyanosis	  Neurologic: Non-focal  Extremities: Normal range of motion, No clubbing, cyanosis or edema  Vascular: Peripheral pulses palpable 2+ bilaterally    TELEMETRY: 	    ECG:  	  RADIOLOGY:  OTHER: 	  	  LABS:	 	    CARDIAC MARKERS:                                14.0   9.0   )-----------( 303      ( 23 Sep 2017 12:02 )             42.3     09-23    142  |  103  |  18  ----------------------------<  143<H>  4.4   |  23  |  0.72    Ca    9.6      23 Sep 2017 12:02    TPro  7.6  /  Alb  4.5  /  TBili  0.3  /  DBili  x   /  AST  16  /  ALT  15  /  AlkPhos  71  09-23    proBNP:   Lipid Profile:   HgA1c:   TSH: Thyroid Stimulating Hormone, Serum: 3.54 uIU/mL (09-24 @ 11:40)

## 2017-09-25 NOTE — PROGRESS NOTE ADULT - ATTENDING COMMENTS
Darwin Hernandez  Attending Physician   Division of Infectious Disease  Pager #[phone]  After 5pm/weekend #[phone]    Will sign off, recall ID if needed #[phone].

## 2017-09-25 NOTE — PROGRESS NOTE ADULT - SUBJECTIVE AND OBJECTIVE BOX
OLU DUTTA 87y [id number]    Patient is a 87y old  Female who presents with a chief complaint of     Follow Up/CC:  Dizziness    Interval History/ROS: still with dizziness with walking    Allergies    No Known Allergies    Intolerances        ANTIMICROBIALS:      MEDICATIONS  (STANDING):  aspirin  chewable 81 milliGRAM(s) Oral daily  meclizine 25 milliGRAM(s) Oral daily  simvastatin 20 milliGRAM(s) Oral at bedtime  clorazepate 7.5 milliGRAM(s) Oral three times a day  amLODIPine   Tablet 5 milliGRAM(s) Oral daily  lisinopril 5 milliGRAM(s) Oral daily  sodium chloride 0.9%. 1000 milliLiter(s) (75 mL/Hr) IV Continuous <Continuous>  influenza   Vaccine 0.5 milliLiter(s) IntraMuscular once  insulin lispro (HumaLOG) corrective regimen sliding scale   SubCutaneous three times a day before meals  dextrose 5%. 1000 milliLiter(s) (50 mL/Hr) IV Continuous <Continuous>  dextrose 50% Injectable 12.5 Gram(s) IV Push once  dextrose 50% Injectable 25 Gram(s) IV Push once  dextrose 50% Injectable 25 Gram(s) IV Push once  heparin  Injectable 5000 Unit(s) SubCutaneous every 12 hours    MEDICATIONS  (PRN):  dextrose Gel 1 Dose(s) Oral once PRN Blood Glucose LESS THAN 70 milliGRAM(s)/deciliter  glucagon  Injectable 1 milliGRAM(s) IntraMuscular once PRN Glucose LESS THAN 70 milligrams/deciliter        Vital Signs Last 24 Hrs  T(C): 36.7 (25 Sep 2017 12:31), Max: 37.2 (25 Sep 2017 09:11)  T(F): 98 (25 Sep 2017 12:31), Max: 99 (25 Sep 2017 09:11)  HR: 63 (25 Sep 2017 12:31) (63 - 89)  BP: 131/65 (25 Sep 2017 12:31) (128/76 - 146/78)  BP(mean): --  RR: 18 (25 Sep 2017 12:31) (18 - 18)  SpO2: 97% (25 Sep 2017 12:31) (94% - 97%)                  MICROBIOLOGY:    Culture - Urine (09.24.17 @ 13:38)    Specimen Source: .Urine Clean Catch (Midstream)    Culture Results:   No growth        RADIOLOGY    CXR:    CT HEAD:    CT CHEST:    CT ABDOMEN:    MRI:    OTHER:

## 2017-09-25 NOTE — PROGRESS NOTE ADULT - SUBJECTIVE AND OBJECTIVE BOX
Patient is a 87y old  Female who presents with a chief complaint of     HPI:pt continues to co dizziness      Vital Signs Last 24 Hrs  T(C): 36.3 (25 Sep 2017 01:24), Max: 36.7 (24 Sep 2017 16:13)  T(F): 97.3 (25 Sep 2017 01:24), Max: 98 (24 Sep 2017 16:13)  HR: 71 (25 Sep 2017 01:24) (71 - 75)  BP: 141/61 (25 Sep 2017 01:24) (130/72 - 146/78)  BP(mean): --  RR: 18 (25 Sep 2017 01:24) (18 - 18)  SpO2: 94% (25 Sep 2017 01:24) (94% - 97%)    Physical Exam    Mental Status- AAO x 3, speech fluent without dysarthria, memory and fund of knowledge intact  CN- 2-12 intact  Motor- 5/5 x 4 ext, nl bulk and tone  Sensory- intact Lt/PP/propriception  Coordination- No dysmetria UE/LE  Gait- deferred

## 2017-09-26 ENCOUNTER — TRANSCRIPTION ENCOUNTER (OUTPATIENT)
Age: 82
End: 2017-09-26

## 2017-09-26 VITALS
OXYGEN SATURATION: 94 % | SYSTOLIC BLOOD PRESSURE: 130 MMHG | TEMPERATURE: 98 F | DIASTOLIC BLOOD PRESSURE: 74 MMHG | RESPIRATION RATE: 16 BRPM | HEART RATE: 67 BPM

## 2017-09-26 LAB
ANION GAP SERPL CALC-SCNC: 13 MMOL/L — SIGNIFICANT CHANGE UP (ref 5–17)
BUN SERPL-MCNC: 14 MG/DL — SIGNIFICANT CHANGE UP (ref 7–23)
CALCIUM SERPL-MCNC: 8.9 MG/DL — SIGNIFICANT CHANGE UP (ref 8.4–10.5)
CHLORIDE SERPL-SCNC: 106 MMOL/L — SIGNIFICANT CHANGE UP (ref 96–108)
CO2 SERPL-SCNC: 21 MMOL/L — LOW (ref 22–31)
CREAT SERPL-MCNC: 0.52 MG/DL — SIGNIFICANT CHANGE UP (ref 0.5–1.3)
GLUCOSE SERPL-MCNC: 160 MG/DL — HIGH (ref 70–99)
HCT VFR BLD CALC: 37.4 % — SIGNIFICANT CHANGE UP (ref 34.5–45)
HGB BLD-MCNC: 12 G/DL — SIGNIFICANT CHANGE UP (ref 11.5–15.5)
MCHC RBC-ENTMCNC: 26.9 PG — LOW (ref 27–34)
MCHC RBC-ENTMCNC: 32.1 GM/DL — SIGNIFICANT CHANGE UP (ref 32–36)
MCV RBC AUTO: 83.9 FL — SIGNIFICANT CHANGE UP (ref 80–100)
PLATELET # BLD AUTO: 315 K/UL — SIGNIFICANT CHANGE UP (ref 150–400)
POTASSIUM SERPL-MCNC: 4 MMOL/L — SIGNIFICANT CHANGE UP (ref 3.5–5.3)
POTASSIUM SERPL-SCNC: 4 MMOL/L — SIGNIFICANT CHANGE UP (ref 3.5–5.3)
RBC # BLD: 4.46 M/UL — SIGNIFICANT CHANGE UP (ref 3.8–5.2)
RBC # FLD: 14.3 % — SIGNIFICANT CHANGE UP (ref 10.3–14.5)
SODIUM SERPL-SCNC: 140 MMOL/L — SIGNIFICANT CHANGE UP (ref 135–145)
WBC # BLD: 8.26 K/UL — SIGNIFICANT CHANGE UP (ref 3.8–10.5)
WBC # FLD AUTO: 8.26 K/UL — SIGNIFICANT CHANGE UP (ref 3.8–10.5)

## 2017-09-26 PROCEDURE — 70547 MR ANGIOGRAPHY NECK W/O DYE: CPT | Mod: 26

## 2017-09-26 PROCEDURE — 70544 MR ANGIOGRAPHY HEAD W/O DYE: CPT | Mod: 26,59

## 2017-09-26 PROCEDURE — 70551 MRI BRAIN STEM W/O DYE: CPT | Mod: 26

## 2017-09-26 PROCEDURE — 93306 TTE W/DOPPLER COMPLETE: CPT | Mod: 26

## 2017-09-26 RX ORDER — ASPIRIN/CALCIUM CARB/MAGNESIUM 324 MG
1 TABLET ORAL
Qty: 0 | Refills: 0 | DISCHARGE
Start: 2017-09-26

## 2017-09-26 RX ORDER — ASPIRIN/CALCIUM CARB/MAGNESIUM 324 MG
1 TABLET ORAL
Qty: 0 | Refills: 0 | COMMUNITY

## 2017-09-26 RX ADMIN — LISINOPRIL 5 MILLIGRAM(S): 2.5 TABLET ORAL at 06:25

## 2017-09-26 RX ADMIN — Medication 2: at 08:59

## 2017-09-26 RX ADMIN — Medication 81 MILLIGRAM(S): at 11:00

## 2017-09-26 RX ADMIN — Medication 25 MILLIGRAM(S): at 11:00

## 2017-09-26 RX ADMIN — HEPARIN SODIUM 5000 UNIT(S): 5000 INJECTION INTRAVENOUS; SUBCUTANEOUS at 06:25

## 2017-09-26 RX ADMIN — AMLODIPINE BESYLATE 5 MILLIGRAM(S): 2.5 TABLET ORAL at 06:25

## 2017-09-26 NOTE — DISCHARGE NOTE ADULT - HOME CARE AGENCY
[facility], RN will start care the day after discharge. [phone]

## 2017-09-26 NOTE — DISCHARGE NOTE ADULT - MEDICATION SUMMARY - MEDICATIONS TO TAKE
I will START or STAY ON the medications listed below when I get home from the hospital:    b-complex  -- 1 tab(s) by mouth once a day  -- Indication: For supplement     aspirin 81 mg oral tablet, chewable  -- 1 tab(s) by mouth once a day  -- Indication: For CAD    Tylenol 500 mg oral tablet  -- 1 tab(s) by mouth once a day  -- Indication: For pain     metFORMIN 500 mg oral tablet  -- 2 tab(s) by mouth 2 times a day  -- Indication: For DM2    meclizine 25 mg oral tablet  -- 1 tab(s) by mouth once a day, As Needed  -- Indication: For Vertigo    simvastatin 20 mg oral tablet  -- 1 tab(s) by mouth once a day (at bedtime)  -- Indication: For HLD    amLODIPine-benazepril 5 mg-20 mg oral capsule  -- 1 cap(s) by mouth once a day  -- Indication: For HTN    zolpidem 5 mg oral tablet  -- 1 tab(s) by mouth once a day (at bedtime), As Needed  -- Indication: For INSOMNIA     clorazepate 7.5 mg oral tablet  -- 1 tab(s) by mouth 3 times a day  -- Indication: For CNS     Lidoderm 5% topical film  -- Apply on skin to affected area once a day, As Needed  -- Indication: For pain

## 2017-09-26 NOTE — DISCHARGE NOTE ADULT - PATIENT PORTAL LINK FT
“You can access the Follow[facility] Patient Portal, offered by [facility], by registering with the following website: [email or web address]”

## 2017-09-26 NOTE — DISCHARGE NOTE ADULT - HOSPITAL COURSE
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 She comes into the ED due to dizziness and unsteady gait. She has a history of vertigo like symptoms and dizziness for which she takes meclizine. Over the past three days the meclizine has been unable to control her symptoms. She reports tinnitus for months, vertigo for the past month and feeling unsteady for the past three days. Labs are WNL except urinalysis positive for leukocyte esterase, few bacteria and 11-25 wbcs, and urine cx was negative monitored off abx.  CT head is negative for any acute findings (no changes since 2012). MRI brain, head and neck  showed no acute pathology,  no vascular aneurysm or flow-limiting stenosis. 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 She comes into the ED due to dizziness and unsteady gait. She has a history of vertigo like symptoms and dizziness for which she takes meclizine. Over the past three days the meclizine has been unable to control her symptoms. She reports tinnitus for months, vertigo for the past month and feeling unsteady for the past three days. Labs are WNL except urinalysis positive for leukocyte esterase, few bacteria and 11-25 wbcs; but  urine cx was negative and  monitored off abx.  CT head is negative for any acute findings (no changes since 2012). MRI brain, head and neck  showed no acute pathology,  no vascular aneurysm or flow-limiting stenosis. Pt is  stable for  discharged home with follow up with her PCP and neurologist

## 2017-09-26 NOTE — DISCHARGE NOTE ADULT - PROVIDER TOKENS
VIANCA:'[id number]' TOKEN:'[id number]',FREE:[LAST:[Dr. Rodriges],PHONE:[(   )    -],FAX:[(   )    -],ADDRESS:[PCP]]

## 2017-09-26 NOTE — DISCHARGE NOTE ADULT - SECONDARY DIAGNOSIS.
Type 2 diabetes mellitus with complication, without long-term current use of insulin Hyperlipidemia, unspecified hyperlipidemia type Essential hypertension

## 2017-09-26 NOTE — DISCHARGE NOTE ADULT - CARE PROVIDER_API CALL
Mark Romero (DO), Neurology  [address] [place] [address]  Phone: [phone]  Fax: [phone] Mark Romero (DO), Neurology  [address] [place] [address]  Phone: [phone]  Fax: [phone]    Dr. Rodriges,   PCP  Phone: (   )    -  Fax: (   )    -

## 2017-09-26 NOTE — PROGRESS NOTE ADULT - ASSESSMENT
79yo F with dizzines  1. FU MRI brain  2. PT
86yo F with dizziness  1. MRI pend
pt w/ worsening dizziness /  vertigo?bpv  neuro eval noted  mri/ mra ordered  iv fluid prn  check urine culture   id eval noted  hold addit abs  pt  dvt proph  cont meclizine
pt w/ worsening dizziness /  vertigo?bpv  neuro eval noted  mri/ mra ordered  iv fluid prn  neg urine culture  id eval noted  hold addit abs  pt  dvt proph  cont meclizine  orthostatics
pt w/ worsening dizziness /improved  vertigo?bpv  neuro eval noted  mri/ mra negative  neg urine culture  id eval noted  no  abs  pt  dvt proph  cont meclizine  d/c planning
87F with dizziness with abnormal urinalysis

## 2017-09-26 NOTE — PROGRESS NOTE ADULT - SUBJECTIVE AND OBJECTIVE BOX
Patient is a 87y old  Female who presents with a chief complaint of     HPI:  pt seen, dizziness improved      Vital Signs Last 24 Hrs  T(C): 36.8 (25 Sep 2017 21:35), Max: 37.2 (25 Sep 2017 09:11)  T(F): 98.3 (25 Sep 2017 21:35), Max: 99 (25 Sep 2017 09:11)  HR: 78 (25 Sep 2017 21:35) (63 - 89)  BP: 143/79 (25 Sep 2017 21:35) (121/78 - 143/79)  BP(mean): --  RR: 18 (25 Sep 2017 21:35) (16 - 18)  SpO2: 93% (25 Sep 2017 21:35) (93% - 97%)    Physical Exam    Mental Status- AAO x 3, speech fluent without dysarthria, memory and fund of knowledge intact  CN- 2-12 intact  Motor- 5/5 x 4 ext, nl bulk and tone  Sensory- intact Lt/PP/propriception  Coordination- No dysmetria UE/LE  Gait- deferred

## 2017-09-26 NOTE — DISCHARGE NOTE ADULT - PLAN OF CARE
symptoms improved follow up with your Neurologist  continue to take medications as prescribe HgA1C this admission.  Make sure you get your HgA1c checked every three months.  If you take oral diabetes medications, check your blood glucose two times a day.  If you take insulin, check your blood glucose before meals and at bedtime.  It's important not to skip any meals.  Keep a log of your blood glucose results and always take it with you to your doctor appointments.  Keep a list of your current medications including injectables and over the counter medications and bring this medication list with you to all your doctor appointments.  If you have not seen your ophthalmologist this year call for appointment.  Check your feet daily for redness, sores, or openings. Do not self treat. If no improvement in two days call your primary care physician for an appointment.  Low blood sugar (hypoglycemia) is a blood sugar below 70mg/dl. Check your blood sugar if you feel signs/symptoms of hypoglycemia. If your blood sugar is below 70 take 15 grams of carbohydrates (ex 4 oz of apple juice, 3-4 glucose tablets, or 4-6 oz of regular soda) wait 15 minutes and repeat blood sugar to make sure it comes up above 70.  If your blood sugar is above 70 and you are due for a meal, have a meal.  If you are not due for a meal have a snack.  This snack helps keeps your blood sugar at a safe range. Continue with your cholesterol medications. Eat a heart healthy diet that is low in saturated fats and salt, and includes whole grains, fruits, vegetables and lean protein; exercise regularly (consult with your physician or cardiologist first); maintain a heart healthy weight; if you smoke - quit (A resource to help you stop smoking is the [facility] Tobacco [facility] – phone number [phone].). Continue to follow with your primary physician or cardiologist.  Seek medical help for dizziness, Lightheadedness, Blurry vision, Headache, Chest pain, Shortness of breath Low salt diet  Activity as tolerated.  Take all medication as prescribed.  Follow up with your medical doctor for routine blood pressure monitoring at your next visit.  Notify your doctor if you have any of the following symptoms:   Dizziness, Lightheadedness, Blurry vision, Headache, Chest pain, Shortness of breath

## 2017-09-26 NOTE — PROGRESS NOTE ADULT - SUBJECTIVE AND OBJECTIVE BOX
CHIEF COMPLAINT:Patient  with improved dizziness    	        PAST MEDICAL & SURGICAL HISTORY:  Pulmonary embolism: 2012  Tuberculosis: 1956  Hyperlipidemia  Diabetes: type II  Hypertension  Metastatic carcinoma  Breast cancer  H/O left mastectomy  H/O abdominal hysterectomy          REVIEW OF SYSTEMS:  pt improved   no cp or sob    no h/a    no chills    Medications:  MEDICATIONS  (STANDING):  aspirin  chewable 81 milliGRAM(s) Oral daily  meclizine 25 milliGRAM(s) Oral daily  simvastatin 20 milliGRAM(s) Oral at bedtime  clorazepate 7.5 milliGRAM(s) Oral three times a day  amLODIPine   Tablet 5 milliGRAM(s) Oral daily  lisinopril 5 milliGRAM(s) Oral daily  sodium chloride 0.9%. 1000 milliLiter(s) (75 mL/Hr) IV Continuous <Continuous>  influenza   Vaccine 0.5 milliLiter(s) IntraMuscular once  insulin lispro (HumaLOG) corrective regimen sliding scale   SubCutaneous three times a day before meals  dextrose 5%. 1000 milliLiter(s) (50 mL/Hr) IV Continuous <Continuous>  dextrose 50% Injectable 12.5 Gram(s) IV Push once  dextrose 50% Injectable 25 Gram(s) IV Push once  dextrose 50% Injectable 25 Gram(s) IV Push once  heparin  Injectable 5000 Unit(s) SubCutaneous every 12 hours    MEDICATIONS  (PRN):  dextrose Gel 1 Dose(s) Oral once PRN Blood Glucose LESS THAN 70 milliGRAM(s)/deciliter  glucagon  Injectable 1 milliGRAM(s) IntraMuscular once PRN Glucose LESS THAN 70 milligrams/deciliter    	    PHYSICAL EXAM:  T(C): 36.9 (09-26-17 @ 08:17), Max: 36.9 (09-26-17 @ 08:17)  HR: 70 (09-26-17 @ 08:17) (63 - 80)  BP: 153/80 (09-26-17 @ 08:17) (121/78 - 153/80)  RR: 16 (09-26-17 @ 08:17) (16 - 18)  SpO2: 95% (09-26-17 @ 08:17) (93% - 97%)  Wt(kg): --  I&O's Summary    25 Sep 2017 07:01  -  26 Sep 2017 07:00  --------------------------------------------------------  IN: 1120 mL / OUT: 0 mL / NET: 1120 mL        Appearance: Normal	  HEENT:   Normal oral mucosa, PERRL, EOMI	  Lymphatic: No lymphadenopathy  Cardiovascular: Normal S1 S2, No JVD, No murmurs, No edema  Respiratory: Lungs clear to auscultation	  Psychiatry: A & O x 3, Mood & affect appropriate  Gastrointestinal:  Soft, Non-tender, + BS	  Skin: No rashes, No ecchymoses, No cyanosis	  Neurologic: Non-focal  Extremities: Normal range of motion, No clubbing, cyanosis or edema  Vascular: Peripheral pulses palpable 2+ bilaterally    TELEMETRY: 	    ECG:  	  RADIOLOGY:  OTHER: 	  	  LABS:	 	    CARDIAC MARKERS:  CARDIAC MARKERS ( 25 Sep 2017 11:11 )  x     / <0.01 ng/mL / x     / x     / x                                    12.0   8.26  )-----------( 315      ( 26 Sep 2017 07:48 )             37.4           proBNP:   Lipid Profile:   HgA1c:   TSH:

## 2017-09-26 NOTE — DISCHARGE NOTE ADULT - CARE PLAN
Principal Discharge DX:	Vertigo  Goal:	symptoms improved  Instructions for follow-up, activity and diet:	follow up with your Neurologist  continue to take medications as prescribe  Secondary Diagnosis:	Type 2 diabetes mellitus with complication, without long-term current use of insulin  Instructions for follow-up, activity and diet:	HgA1C this admission.  Make sure you get your HgA1c checked every three months.  If you take oral diabetes medications, check your blood glucose two times a day.  If you take insulin, check your blood glucose before meals and at bedtime.  It's important not to skip any meals.  Keep a log of your blood glucose results and always take it with you to your doctor appointments.  Keep a list of your current medications including injectables and over the counter medications and bring this medication list with you to all your doctor appointments.  If you have not seen your ophthalmologist this year call for appointment.  Check your feet daily for redness, sores, or openings. Do not self treat. If no improvement in two days call your primary care physician for an appointment.  Low blood sugar (hypoglycemia) is a blood sugar below 70mg/dl. Check your blood sugar if you feel signs/symptoms of hypoglycemia. If your blood sugar is below 70 take 15 grams of carbohydrates (ex 4 oz of apple juice, 3-4 glucose tablets, or 4-6 oz of regular soda) wait 15 minutes and repeat blood sugar to make sure it comes up above 70.  If your blood sugar is above 70 and you are due for a meal, have a meal.  If you are not due for a meal have a snack.  This snack helps keeps your blood sugar at a safe range.  Secondary Diagnosis:	Hyperlipidemia, unspecified hyperlipidemia type  Instructions for follow-up, activity and diet:	Continue with your cholesterol medications. Eat a heart healthy diet that is low in saturated fats and salt, and includes whole grains, fruits, vegetables and lean protein; exercise regularly (consult with your physician or cardiologist first); maintain a heart healthy weight; if you smoke - quit (A resource to help you stop smoking is the [facility] – phone number [phone].). Continue to follow with your primary physician or cardiologist.  Seek medical help for dizziness, Lightheadedness, Blurry vision, Headache, Chest pain, Shortness of breath  Secondary Diagnosis:	Essential hypertension  Instructions for follow-up, activity and diet:	Low salt diet  Activity as tolerated.  Take all medication as prescribed.  Follow up with your medical doctor for routine blood pressure monitoring at your next visit.  Notify your doctor if you have any of the following symptoms:   Dizziness, Lightheadedness, Blurry vision, Headache, Chest pain, Shortness of breath

## 2017-09-26 NOTE — DISCHARGE NOTE ADULT - ADDITIONAL INSTRUCTIONS
follow up with your PCp   follow up with your neurologist follow up with your PCP   follow up with your neurologist

## 2017-09-26 NOTE — CHART NOTE - NSCHARTNOTEFT_GEN_A_CORE
Request from  to facilitate discharge.  Medication reconciliation  reviewed, revised and resolved with Dr. Martines  who has medically cleared pt for discharge with follow up as advised. Please refer to discharge note for detailed hospital course.   AVANI Schmid NP

## 2017-10-19 PROCEDURE — 84443 ASSAY THYROID STIM HORMONE: CPT

## 2017-10-19 PROCEDURE — 80053 COMPREHEN METABOLIC PANEL: CPT

## 2017-10-19 PROCEDURE — 82962 GLUCOSE BLOOD TEST: CPT

## 2017-10-19 PROCEDURE — 70548 MR ANGIOGRAPHY NECK W/DYE: CPT

## 2017-10-19 PROCEDURE — 70544 MR ANGIOGRAPHY HEAD W/O DYE: CPT

## 2017-10-19 PROCEDURE — 87086 URINE CULTURE/COLONY COUNT: CPT

## 2017-10-19 PROCEDURE — 93306 TTE W/DOPPLER COMPLETE: CPT

## 2017-10-19 PROCEDURE — 80048 BASIC METABOLIC PNL TOTAL CA: CPT

## 2017-10-19 PROCEDURE — 81001 URINALYSIS AUTO W/SCOPE: CPT

## 2017-10-19 PROCEDURE — 99285 EMERGENCY DEPT VISIT HI MDM: CPT | Mod: 25

## 2017-10-19 PROCEDURE — 83036 HEMOGLOBIN GLYCOSYLATED A1C: CPT

## 2017-10-19 PROCEDURE — 84484 ASSAY OF TROPONIN QUANT: CPT

## 2017-10-19 PROCEDURE — 85027 COMPLETE CBC AUTOMATED: CPT

## 2017-10-19 PROCEDURE — 70450 CT HEAD/BRAIN W/O DYE: CPT

## 2017-10-19 PROCEDURE — 97162 PT EVAL MOD COMPLEX 30 MIN: CPT

## 2017-10-19 PROCEDURE — 70551 MRI BRAIN STEM W/O DYE: CPT

## 2018-01-17 ENCOUNTER — INPATIENT (INPATIENT)
Facility: HOSPITAL | Age: 83
LOS: 4 days | Discharge: HOME | DRG: 191 | End: 2018-01-22
Attending: HOSPITALIST | Admitting: HOSPITALIST
Payer: MEDICARE

## 2018-01-17 VITALS
SYSTOLIC BLOOD PRESSURE: 163 MMHG | RESPIRATION RATE: 20 BRPM | OXYGEN SATURATION: 98 % | WEIGHT: 162.04 LBS | HEART RATE: 80 BPM | DIASTOLIC BLOOD PRESSURE: 82 MMHG | TEMPERATURE: 99 F

## 2018-01-17 DIAGNOSIS — R63.8 OTHER SYMPTOMS AND SIGNS CONCERNING FOOD AND FLUID INTAKE: ICD-10-CM

## 2018-01-17 DIAGNOSIS — R04.2 HEMOPTYSIS: ICD-10-CM

## 2018-01-17 DIAGNOSIS — B97.4 RESPIRATORY SYNCYTIAL VIRUS AS THE CAUSE OF DISEASES CLASSIFIED ELSEWHERE: ICD-10-CM

## 2018-01-17 DIAGNOSIS — Z90.12 ACQUIRED ABSENCE OF LEFT BREAST AND NIPPLE: Chronic | ICD-10-CM

## 2018-01-17 DIAGNOSIS — Z29.9 ENCOUNTER FOR PROPHYLACTIC MEASURES, UNSPECIFIED: ICD-10-CM

## 2018-01-17 DIAGNOSIS — E11.9 TYPE 2 DIABETES MELLITUS WITHOUT COMPLICATIONS: ICD-10-CM

## 2018-01-17 DIAGNOSIS — Z90.710 ACQUIRED ABSENCE OF BOTH CERVIX AND UTERUS: Chronic | ICD-10-CM

## 2018-01-17 LAB
ALBUMIN SERPL ELPH-MCNC: 4.1 G/DL — SIGNIFICANT CHANGE UP (ref 3.3–5)
ALP SERPL-CCNC: 69 U/L — SIGNIFICANT CHANGE UP (ref 40–120)
ALT FLD-CCNC: 14 U/L RC — SIGNIFICANT CHANGE UP (ref 10–45)
ANION GAP SERPL CALC-SCNC: 15 MMOL/L — SIGNIFICANT CHANGE UP (ref 5–17)
APTT BLD: 30.2 SEC — SIGNIFICANT CHANGE UP (ref 27.5–37.4)
AST SERPL-CCNC: 17 U/L — SIGNIFICANT CHANGE UP (ref 10–40)
BASOPHILS # BLD AUTO: 0 K/UL — SIGNIFICANT CHANGE UP (ref 0–0.2)
BASOPHILS NFR BLD AUTO: 0.7 % — SIGNIFICANT CHANGE UP (ref 0–2)
BILIRUB SERPL-MCNC: 0.1 MG/DL — LOW (ref 0.2–1.2)
BUN SERPL-MCNC: 14 MG/DL — SIGNIFICANT CHANGE UP (ref 7–23)
CALCIUM SERPL-MCNC: 9 MG/DL — SIGNIFICANT CHANGE UP (ref 8.4–10.5)
CHLORIDE SERPL-SCNC: 103 MMOL/L — SIGNIFICANT CHANGE UP (ref 96–108)
CK MB CFR SERPL CALC: 1.9 NG/ML — SIGNIFICANT CHANGE UP (ref 0–3.8)
CK SERPL-CCNC: 51 U/L — SIGNIFICANT CHANGE UP (ref 25–170)
CO2 SERPL-SCNC: 25 MMOL/L — SIGNIFICANT CHANGE UP (ref 22–31)
CREAT SERPL-MCNC: 0.61 MG/DL — SIGNIFICANT CHANGE UP (ref 0.5–1.3)
EOSINOPHIL # BLD AUTO: 0.3 K/UL — SIGNIFICANT CHANGE UP (ref 0–0.5)
EOSINOPHIL NFR BLD AUTO: 4.8 % — SIGNIFICANT CHANGE UP (ref 0–6)
GLUCOSE SERPL-MCNC: 180 MG/DL — HIGH (ref 70–99)
HCT VFR BLD CALC: 37.6 % — SIGNIFICANT CHANGE UP (ref 34.5–45)
HGB BLD-MCNC: 13 G/DL — SIGNIFICANT CHANGE UP (ref 11.5–15.5)
INR BLD: 1.05 RATIO — SIGNIFICANT CHANGE UP (ref 0.88–1.16)
LYMPHOCYTES # BLD AUTO: 2 K/UL — SIGNIFICANT CHANGE UP (ref 1–3.3)
LYMPHOCYTES # BLD AUTO: 30.5 % — SIGNIFICANT CHANGE UP (ref 13–44)
MCHC RBC-ENTMCNC: 29.7 PG — SIGNIFICANT CHANGE UP (ref 27–34)
MCHC RBC-ENTMCNC: 34.5 GM/DL — SIGNIFICANT CHANGE UP (ref 32–36)
MCV RBC AUTO: 85.9 FL — SIGNIFICANT CHANGE UP (ref 80–100)
MONOCYTES # BLD AUTO: 0.6 K/UL — SIGNIFICANT CHANGE UP (ref 0–0.9)
MONOCYTES NFR BLD AUTO: 9.4 % — SIGNIFICANT CHANGE UP (ref 2–14)
NEUTROPHILS # BLD AUTO: 3.5 K/UL — SIGNIFICANT CHANGE UP (ref 1.8–7.4)
NEUTROPHILS NFR BLD AUTO: 54.6 % — SIGNIFICANT CHANGE UP (ref 43–77)
NT-PROBNP SERPL-SCNC: 296 PG/ML — SIGNIFICANT CHANGE UP (ref 0–300)
PLATELET # BLD AUTO: 287 K/UL — SIGNIFICANT CHANGE UP (ref 150–400)
POTASSIUM SERPL-MCNC: 3.8 MMOL/L — SIGNIFICANT CHANGE UP (ref 3.5–5.3)
POTASSIUM SERPL-SCNC: 3.8 MMOL/L — SIGNIFICANT CHANGE UP (ref 3.5–5.3)
PROT SERPL-MCNC: 7.2 G/DL — SIGNIFICANT CHANGE UP (ref 6–8.3)
PROTHROM AB SERPL-ACNC: 11.4 SEC — SIGNIFICANT CHANGE UP (ref 9.8–12.7)
RAPID RVP RESULT: DETECTED
RBC # BLD: 4.38 M/UL — SIGNIFICANT CHANGE UP (ref 3.8–5.2)
RBC # FLD: 12.2 % — SIGNIFICANT CHANGE UP (ref 10.3–14.5)
RSV RNA SPEC QL NAA+PROBE: DETECTED
SODIUM SERPL-SCNC: 143 MMOL/L — SIGNIFICANT CHANGE UP (ref 135–145)
TROPONIN T SERPL-MCNC: <0.01 NG/ML — SIGNIFICANT CHANGE UP (ref 0–0.06)
WBC # BLD: 6.5 K/UL — SIGNIFICANT CHANGE UP (ref 3.8–10.5)
WBC # FLD AUTO: 6.5 K/UL — SIGNIFICANT CHANGE UP (ref 3.8–10.5)

## 2018-01-17 PROCEDURE — 71275 CT ANGIOGRAPHY CHEST: CPT | Mod: 26

## 2018-01-17 PROCEDURE — 71045 X-RAY EXAM CHEST 1 VIEW: CPT | Mod: 26

## 2018-01-17 PROCEDURE — 99285 EMERGENCY DEPT VISIT HI MDM: CPT | Mod: GC

## 2018-01-17 RX ORDER — ACETAMINOPHEN 500 MG
650 TABLET ORAL EVERY 6 HOURS
Qty: 0 | Refills: 0 | Status: DISCONTINUED | OUTPATIENT
Start: 2018-01-17 | End: 2018-01-22

## 2018-01-17 NOTE — ED ADULT NURSE REASSESSMENT NOTE - NS ED NURSE REASSESS COMMENT FT1
Report received from Jennifer BALTAZAR. Patient A&Ox3, neuro intact, VSS. Family at bedside. Quantiferon Gold test drawn and sent to lab. Patient on airborne precautions for r/o TB. Will continue to monitor.

## 2018-01-17 NOTE — H&P ADULT - NSHPSOCIALHISTORY_GEN_ALL_CORE
never smoker, denies EtOH use, denies illicit drug use  lives with daughter in [place], has a HHA M-Sa 7h/day, walks with walker, requires assistance with bathing, feeds herself independently

## 2018-01-17 NOTE — H&P ADULT - NSHPPHYSICALEXAM_GEN_ALL_CORE
T(C): 36.9 (01-17-18 @ 21:51), Max: 37.4 (01-17-18 @ 19:47)  HR: 67 (01-17-18 @ 21:51) (67 - 80)  BP: 138/78 (01-17-18 @ 21:51) (138/78 - 163/82)  RR: 18 (01-17-18 @ 21:51) (18 - 20)  SpO2: 97% (01-17-18 @ 21:51) (97% - 100%)    Gen:  HENT:  Lymph:  Eye:  CV:  Pulm:  Abd:  Skin:  Ext:  Neuro:  Psych: T(C): 36.9 (01-17-18 @ 21:51), Max: 37.4 (01-17-18 @ 19:47)  HR: 67 (01-17-18 @ 21:51) (67 - 80)  BP: 138/78 (01-17-18 @ 21:51) (138/78 - 163/82)  RR: 18 (01-17-18 @ 21:51) (18 - 20)  SpO2: 97% (01-17-18 @ 21:51) (97% - 100%)    Gen: awake, alert, actively coughing  HENT: neck soft / supple, MMM  Lymph: no LAD noted in neck  Eye: sclerae anicteric  CV: normal rate, regular rhythm  Pulm: diffuse congestion noted at all lung fields bilaterally  Abd: +BS, soft, NT, ND  Skin: warm, dry  Ext: chronic weakness of LUE, no LE edema  Neuro: answering questions appropriately, following commands appropriately, recent and remote memory intact  Psych: normal mood / affect

## 2018-01-17 NOTE — H&P ADULT - ASSESSMENT
87F h/o DM2, breast ca s/p mastectomy, TB (1960s) here with cough / hemoptysis x5 days. 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  Also found to be RSV+. 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  Also found to be RSV+. 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  Also found to be RSV+.

## 2018-01-17 NOTE — H&P ADULT - FAMILY HISTORY
Child  Still living? Unknown  Family history of breast cancer, Age at diagnosis: Age Unknown Child  Still living? Unknown  Family history of breast cancer, Age at diagnosis: Age Unknown     Mother  Still living? Unknown  Family history of active pulmonary tuberculosis in patient's mother, Age at diagnosis: Age Unknown

## 2018-01-17 NOTE — H&P ADULT - NSHPLABSRESULTS_GEN_ALL_CORE
.  Labs reviewed personally.                          13.0   6.5   )-----------( 287      ( 17 Jan 2018 15:37 )             37.6     Hgb Trend: 13.0<--  01-17    143  |  103  |  14  ----------------------------<  180<H>  3.8   |  25  |  0.61    Ca    9.0      17 Jan 2018 15:37    TPro  7.2  /  Alb  4.1  /  TBili  0.1<L>  /  DBili  x   /  AST  17  /  ALT  14  /  AlkPhos  69  01-17    Creatinine Trend: 0.61<--  PT/INR - ( 17 Jan 2018 15:37 )   PT: 11.4 sec;   INR: 1.05 ratio         PTT - ( 17 Jan 2018 15:37 )  PTT:30.2 sec      Imaging reviewed personally.  CXR no focal consolidation.  CTA chest no PE, stable DIMITRY lesion with cavitation, as well as progressive DIMITRY bronchiectasis concerning for poss TB. .  Labs reviewed personally.                          13.0   6.5   )-----------( 287      ( 17 Jan 2018 15:37 )             37.6     Hgb Trend: 13.0<--  01-17    143  |  103  |  14  ----------------------------<  180<H>  3.8   |  25  |  0.61    Ca    9.0      17 Jan 2018 15:37    TPro  7.2  /  Alb  4.1  /  TBili  0.1<L>  /  DBili  x   /  AST  17  /  ALT  14  /  AlkPhos  69  01-17    Creatinine Trend: 0.61<--  PT/INR - ( 17 Jan 2018 15:37 )   PT: 11.4 sec;   INR: 1.05 ratio         PTT - ( 17 Jan 2018 15:37 )  PTT:30.2 sec      Imaging reviewed personally.  CXR no focal consolidation.  CTA chest no PE, stable DIMITRY lesion with cavitation, as well as progressive DIMITRY bronchiectasis concerning for poss TB.      EKG SR with PVCs, LBBB (noted previously), HR 70, QTc 520 (corrected 426), no ARJUN/D noted. .  Labs reviewed personally.                          13.0   6.5   )-----------( 287      ( 17 Jan 2018 15:37 )             37.6     Hgb Trend: 13.0<--  01-17    143  |  103  |  14  ----------------------------<  180<H>  3.8   |  25  |  0.61    Ca    9.0      17 Jan 2018 15:37    TPro  7.2  /  Alb  4.1  /  TBili  0.1<L>  /  DBili  x   /  AST  17  /  ALT  14  /  AlkPhos  69  01-17    Creatinine Trend: 0.61<--  PT/INR - ( 17 Jan 2018 15:37 )   PT: 11.4 sec;   INR: 1.05 ratio         PTT - ( 17 Jan 2018 15:37 )  PTT:30.2 sec      Imaging reviewed personally.  CXR no focal consolidation.  CTA chest no PE, stable DIMITRY lesion with cavitation, as well as progressive DIMITRY bronchiectasis concerning for poss TB.      EKG tracing reviewed personally -  SR with PVCs, LBBB (noted previously), HR 70, QTc 520 (corrected 426), no ARJUN/D noted.

## 2018-01-17 NOTE — H&P ADULT - NSHPREVIEWOFSYSTEMS_GEN_ALL_CORE
Gen: + fever, chills  HENT: + throat pain, nasal congestion, denies dysphagia  Eye: denies changes in vision, eye pain  CV: denies chest pain, palpitations  Pulm: denies SOB, + cough  Abd: denies abd pain, N/V/D/C  : denies hematuria, dysuria  Skin: denies rash, itching  Ext: denies LE edema, pain  Neuro: denies HA, dizziness, lightheadedness

## 2018-01-17 NOTE — H&P ADULT - PROBLEM SELECTOR PLAN 6
- No pharmacologic DVT PPX 2/2 active hemoptysis.  - SCDs.    Dispo: pending further w/u of hemoptysis.    Debbi Celaya MD  PGY-2 | Internal Medicine  [phone] / [phone]

## 2018-01-17 NOTE — ED PROVIDER NOTE - ATTENDING CONTRIBUTION TO CARE
Pt with hemoptysis about 1tablespoon at a time, intermittently for past 4 days, sometimes clear, no assoc fever, vomiting, or other bleeding, +dyspnea.  Clear lungs.  Had TB 50yrs ago.  Had breast cancer s/p surgery with clear margins 5yrs ago.

## 2018-01-17 NOTE — H&P ADULT - PROBLEM SELECTOR PLAN 5
- No pharmacologic DVT PPX 2/2 active hemoptysis.    Dispo: pending further w/u of hemoptysis.    Debbi Celaya MD  PGY-2 | Internal Medicine  [phone] / [phone] - No pharmacologic DVT PPX 2/2 active hemoptysis.  - SCDs.    Dispo: pending further w/u of hemoptysis.    Debbi Celaya MD  PGY-2 | Internal Medicine  [phone] / [phone] - CC diet.

## 2018-01-17 NOTE — H&P ADULT - HISTORY OF PRESENT ILLNESS
87F h/o DM2, breast ca s/p mastectomy, TB (1960s) here with cough / hemoptysis x5 days.    Patient reports ___    In the ED, Tmax 99.3F, HR 69-80, -163/71-82, SpO2 97% RA.  Patient noted to have graciela hemoptysis in ED.  Quant gold obtained.  WBC 6.5, Hgb 13.0, plt 287, INR 1.05, Na 143, K 3.8, Cr 0.61, T bili 0.1.  RVP+ for RSV.  CXR no focal consolidation.  CTA chest no PE, stable DIMITRY lesion with cavitation, as well as progressive DIMITRY bronchiectasis. 87F h/o DM2, breast ca s/p mastectomy, TB (1960s), PE s/p enoxaparin here with cough / hemoptysis x5 days.    Patient reports ___    In the ED, Tmax 99.3F, HR 69-80, -163/71-82, SpO2 97% RA.  Patient noted to have graciela hemoptysis in ED.  Quant gold obtained.  WBC 6.5, Hgb 13.0, plt 287, INR 1.05, Na 143, K 3.8, Cr 0.61, T bili 0.1.  RVP+ for RSV.  CXR no focal consolidation.  CTA chest no PE, stable DIMITRY lesion with cavitation, as well as progressive DIMITRY bronchiectasis. 87F h/o HTN, DM2, breast ca s/p mastectomy, TB (1960s), PE s/p enoxaparin here with cough / hemoptysis x5 days.    Patient reports ___    In the ED, Tmax 99.3F, HR 69-80, -163/71-82, SpO2 97% RA.  Patient noted to have graciela hemoptysis in ED.  Quant gold obtained.  WBC 6.5, Hgb 13.0, plt 287, INR 1.05, Na 143, K 3.8, Cr 0.61, T bili 0.1.  RVP+ for RSV.  CXR no focal consolidation.  CTA chest no PE, stable DIMITRY lesion with cavitation, as well as progressive DIMITRY bronchiectasis. 87F h/o HTN, DM2, breast ca s/p mastectomy, TB (1960s), PE s/p enoxaparin here with cough / hemoptysis x5 days.    Patient reports that she started having coughing on Saturday, along with rhinorrhea, sore throat, hoarseness.  On Sunday, she began to develop subjective fever, as well as bloody cough.  She states that she has been coughing non-stop, with occasional bloody output.    In the ED, Tmax 99.3F, HR 69-80, -163/71-82, SpO2 97% RA.  Patient noted to have graciela hemoptysis in ED.  Quant gold obtained.  WBC 6.5, Hgb 13.0, plt 287, INR 1.05, Na 143, K 3.8, Cr 0.61, T bili 0.1.  RVP+ for RSV.  CXR no focal consolidation.  CTA chest no PE, stable DIMITRY lesion with cavitation, as well as progressive DIMITRY bronchiectasis.    Regarding her TB history, the patient was diagnosed in 1955 and underwent 9 months of treatment at [facility].  She states that her mother also had TB.  Her children do not. 87F h/o HTN, DM2, breast ca s/p mastectomy, active pulmonary TB (dx 1955), PE s/p enoxaparin (2013) here with cough / hemoptysis x5 days.    Patient reports that she started having coughing on Saturday, along with rhinorrhea, sore throat, hoarseness.  On Sunday, she began to develop subjective fever, as well as bloody cough.  She states that she has been coughing non-stop, with occasional bloody output.    In the ED, Tmax 99.3F, HR 69-80, -163/71-82, SpO2 97% RA.  Patient noted to have graciela hemoptysis in ED.  Quant gold obtained.  WBC 6.5, Hgb 13.0, plt 287, INR 1.05, Na 143, K 3.8, Cr 0.61, T bili 0.1.  RVP+ for RSV.  CXR no focal consolidation.  CTA chest no PE, stable DIMITRY lesion with cavitation, as well as progressive DIMITRY bronchiectasis.    Regarding her TB history, the patient was diagnosed in 1955 and underwent 9 months of treatment at [facility].  She states that her mother also had TB.  Her children do not. 87F h/o HTN, DM2, breast ca s/p mastectomy, active pulmonary TB (dx 1955), PE s/p enoxaparin (2013) here with cough / hemoptysis x5 days.    Patient reports that she started having coughing on Saturday, along with rhinorrhea, sore throat, hoarseness.  On Sunday, she began to develop subjective fever, as well as bloody cough.  She states that she has been coughing non-stop, with occasional bloody output.  She reports feeling well prior to this without weight loss, hemoptysis, fevers.    In the ED, Tmax 99.3F, HR 69-80, -163/71-82, SpO2 97% RA.  Patient noted to have graciela hemoptysis in ED.  Quant gold obtained.  WBC 6.5, Hgb 13.0, plt 287, INR 1.05, Na 143, K 3.8, Cr 0.61, T bili 0.1.  RVP+ for RSV.  CXR no focal consolidation.  CTA chest no PE, stable DIMITRY lesion with cavitation, as well as progressive DIMITRY bronchiectasis.    Regarding her TB history, the patient was diagnosed in 1955 and underwent 9 months of treatment at [facility].  She states that her mother also had TB.  Her children do not.

## 2018-01-17 NOTE — ED PROVIDER NOTE - GASTROINTESTINAL NEGATIVE STATEMENT, MLM
left lower quadrant/inguinal abdominal pain, no bloating, no constipation, no diarrhea, no nausea and no vomiting.

## 2018-01-17 NOTE — ED PROVIDER NOTE - OBJECTIVE STATEMENT
88 yo female presenting to the ED because of a 5 day history of cough and hemoptysis. She reports graciela hemoptysis, not blood stained phlegm. She has fevers and chills. Has sick contacts at home (her daughter). No pleuritic chest pain or lower extremity edema. No abdominal pain nausea vomiting or diarrhea. No neruo complaints r lateralizing signs or headache. Has left lower quadrant abdominal pain and possible dysuria. Has a history of tuberculosis in the 1950s, histroy of left sided breast cancer s/p lymph node dissection and ? 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 Has a history of a pulmonary embolism that was caused by one of her cancer medications.

## 2018-01-17 NOTE — ED PROVIDER NOTE - CARE PLAN
Principal Discharge DX:	Hemoptysis Principal Discharge DX:	Hemoptysis  Secondary Diagnosis:	RSV (respiratory syncytial virus infection)

## 2018-01-17 NOTE — H&P ADULT - PROBLEM SELECTOR PLAN 1
- AFB sputum to be obtained x3.  - Airborne precautions.  - No pharmacologic DVT PPX, hold aspirin.  - ID consult in AM. - Concern for recurrent TB.  Also consider bloody sputum 2/2 RSV+.  CTA no PE.  - AFB sputum to be obtained x3.  - Airborne precautions.  - No pharmacologic DVT PPX, hold aspirin.  - ID consult in AM.

## 2018-01-17 NOTE — ED ADULT NURSE NOTE - OBJECTIVE STATEMENT
87 year old female with Hx of tuberculosis in 1956 (treated with 6 months antibiotics) and breast CA (In remission x 5 years) pt has history of left sided mastectomy with lymph node removal. As per daughter she's had the cough x 4 days and started coughing blood tinged sputum. As per daughter she had one episode of coughing up graciela blood. Pt has hx of irregular heart beat. Pt complains of having the chills and feeling feverish. Pt states she has some CP, as per daughter this is associated with the mastectomy and is on the left side and is considered chronic. Pt denies recent weight loss and night sweats. Pt sating 98% on RA, afebrile, VSS, A&O x 4, pt denies urinary symptoms. IV placed, labs drawn, bed in low position, safety measures in place.

## 2018-01-17 NOTE — H&P ADULT - PROBLEM SELECTOR PLAN 3
- ISS.  - FS qAC/HS. - ISS.  - FS qAC/HS.  - Hold oral glycemic control agents. - Continue amlodipine.  - Benazepril not on formulary.  Monitor pressure for now off ACEi, consider adding equivalent ACEi if BP not well-controlled. - will treat with amlodipine.  - Benazepril not on formulary.  Monitor pressure for now off ACEi, consider adding equivalent ACEi if BP not well-controlled.

## 2018-01-17 NOTE — H&P ADULT - PROBLEM SELECTOR PLAN 4
- CC diet. - ISS.  - FS qAC/HS.  - Hold oral glycemic control agents. -will cover with ISS.  - will check FS qAC/HS.  - Hold oral glycemic control agents.

## 2018-01-18 DIAGNOSIS — I26.99 OTHER PULMONARY EMBOLISM WITHOUT ACUTE COR PULMONALE: ICD-10-CM

## 2018-01-18 DIAGNOSIS — I10 ESSENTIAL (PRIMARY) HYPERTENSION: ICD-10-CM

## 2018-01-18 DIAGNOSIS — J98.4 OTHER DISORDERS OF LUNG: ICD-10-CM

## 2018-01-18 LAB
ALBUMIN SERPL ELPH-MCNC: 3.6 G/DL — SIGNIFICANT CHANGE UP (ref 3.3–5)
ALP SERPL-CCNC: 55 U/L — SIGNIFICANT CHANGE UP (ref 40–120)
ALT FLD-CCNC: 13 U/L RC — SIGNIFICANT CHANGE UP (ref 10–45)
ANION GAP SERPL CALC-SCNC: 11 MMOL/L — SIGNIFICANT CHANGE UP (ref 5–17)
AST SERPL-CCNC: 15 U/L — SIGNIFICANT CHANGE UP (ref 10–40)
BILIRUB SERPL-MCNC: 0.2 MG/DL — SIGNIFICANT CHANGE UP (ref 0.2–1.2)
BUN SERPL-MCNC: 14 MG/DL — SIGNIFICANT CHANGE UP (ref 7–23)
CALCIUM SERPL-MCNC: 8.5 MG/DL — SIGNIFICANT CHANGE UP (ref 8.4–10.5)
CHLORIDE SERPL-SCNC: 104 MMOL/L — SIGNIFICANT CHANGE UP (ref 96–108)
CO2 SERPL-SCNC: 27 MMOL/L — SIGNIFICANT CHANGE UP (ref 22–31)
CREAT SERPL-MCNC: 0.7 MG/DL — SIGNIFICANT CHANGE UP (ref 0.5–1.3)
GLUCOSE SERPL-MCNC: 145 MG/DL — HIGH (ref 70–99)
GRAM STN FLD: SIGNIFICANT CHANGE UP
HBA1C BLD-MCNC: 7.8 % — HIGH (ref 4–5.6)
HCT VFR BLD CALC: 36.7 % — SIGNIFICANT CHANGE UP (ref 34.5–45)
HGB BLD-MCNC: 12.4 G/DL — SIGNIFICANT CHANGE UP (ref 11.5–15.5)
MAGNESIUM SERPL-MCNC: 1.8 MG/DL — SIGNIFICANT CHANGE UP (ref 1.6–2.6)
MCHC RBC-ENTMCNC: 29.5 PG — SIGNIFICANT CHANGE UP (ref 27–34)
MCHC RBC-ENTMCNC: 33.8 GM/DL — SIGNIFICANT CHANGE UP (ref 32–36)
MCV RBC AUTO: 87.3 FL — SIGNIFICANT CHANGE UP (ref 80–100)
PHOSPHATE SERPL-MCNC: 3.4 MG/DL — SIGNIFICANT CHANGE UP (ref 2.5–4.5)
PLATELET # BLD AUTO: 248 K/UL — SIGNIFICANT CHANGE UP (ref 150–400)
POTASSIUM SERPL-MCNC: 3.6 MMOL/L — SIGNIFICANT CHANGE UP (ref 3.5–5.3)
POTASSIUM SERPL-SCNC: 3.6 MMOL/L — SIGNIFICANT CHANGE UP (ref 3.5–5.3)
PROT SERPL-MCNC: 6.5 G/DL — SIGNIFICANT CHANGE UP (ref 6–8.3)
RBC # BLD: 4.21 M/UL — SIGNIFICANT CHANGE UP (ref 3.8–5.2)
RBC # FLD: 12 % — SIGNIFICANT CHANGE UP (ref 10.3–14.5)
SODIUM SERPL-SCNC: 142 MMOL/L — SIGNIFICANT CHANGE UP (ref 135–145)
SPECIMEN SOURCE: SIGNIFICANT CHANGE UP
WBC # BLD: 6.3 K/UL — SIGNIFICANT CHANGE UP (ref 3.8–10.5)
WBC # FLD AUTO: 6.3 K/UL — SIGNIFICANT CHANGE UP (ref 3.8–10.5)

## 2018-01-18 PROCEDURE — 12345: CPT | Mod: GC,NC

## 2018-01-18 PROCEDURE — 99223 1ST HOSP IP/OBS HIGH 75: CPT | Mod: AI,GC

## 2018-01-18 PROCEDURE — 99223 1ST HOSP IP/OBS HIGH 75: CPT | Mod: GC

## 2018-01-18 RX ORDER — LISINOPRIL 2.5 MG/1
20 TABLET ORAL DAILY
Qty: 0 | Refills: 0 | Status: DISCONTINUED | OUTPATIENT
Start: 2018-01-18 | End: 2018-01-22

## 2018-01-18 RX ORDER — ACETAMINOPHEN 500 MG
1 TABLET ORAL
Qty: 0 | Refills: 0 | COMMUNITY

## 2018-01-18 RX ORDER — SODIUM CHLORIDE 9 MG/ML
5 INJECTION INTRAMUSCULAR; INTRAVENOUS; SUBCUTANEOUS ONCE
Qty: 0 | Refills: 0 | Status: COMPLETED | OUTPATIENT
Start: 2018-01-18 | End: 2018-01-18

## 2018-01-18 RX ORDER — DEXTROSE 50 % IN WATER 50 %
1 SYRINGE (ML) INTRAVENOUS ONCE
Qty: 0 | Refills: 0 | Status: DISCONTINUED | OUTPATIENT
Start: 2018-01-18 | End: 2018-01-22

## 2018-01-18 RX ORDER — SODIUM CHLORIDE 0.65 %
1 AEROSOL, SPRAY (ML) NASAL
Qty: 0 | Refills: 0 | Status: DISCONTINUED | OUTPATIENT
Start: 2018-01-18 | End: 2018-01-22

## 2018-01-18 RX ORDER — HEPARIN SODIUM 5000 [USP'U]/ML
5000 INJECTION INTRAVENOUS; SUBCUTANEOUS EVERY 12 HOURS
Qty: 0 | Refills: 0 | Status: DISCONTINUED | OUTPATIENT
Start: 2018-01-18 | End: 2018-01-22

## 2018-01-18 RX ORDER — INSULIN LISPRO 100/ML
VIAL (ML) SUBCUTANEOUS AT BEDTIME
Qty: 0 | Refills: 0 | Status: DISCONTINUED | OUTPATIENT
Start: 2018-01-18 | End: 2018-01-22

## 2018-01-18 RX ORDER — MECLIZINE HCL 12.5 MG
1 TABLET ORAL
Qty: 0 | Refills: 0 | COMMUNITY

## 2018-01-18 RX ORDER — INSULIN LISPRO 100/ML
VIAL (ML) SUBCUTANEOUS
Qty: 0 | Refills: 0 | Status: DISCONTINUED | OUTPATIENT
Start: 2018-01-18 | End: 2018-01-22

## 2018-01-18 RX ORDER — SIMVASTATIN 20 MG/1
20 TABLET, FILM COATED ORAL AT BEDTIME
Qty: 0 | Refills: 0 | Status: DISCONTINUED | OUTPATIENT
Start: 2018-01-18 | End: 2018-01-22

## 2018-01-18 RX ORDER — DEXTROSE 50 % IN WATER 50 %
25 SYRINGE (ML) INTRAVENOUS ONCE
Qty: 0 | Refills: 0 | Status: DISCONTINUED | OUTPATIENT
Start: 2018-01-18 | End: 2018-01-22

## 2018-01-18 RX ORDER — GLUCAGON INJECTION, SOLUTION 0.5 MG/.1ML
1 INJECTION, SOLUTION SUBCUTANEOUS ONCE
Qty: 0 | Refills: 0 | Status: DISCONTINUED | OUTPATIENT
Start: 2018-01-18 | End: 2018-01-22

## 2018-01-18 RX ORDER — DEXTROSE 50 % IN WATER 50 %
12.5 SYRINGE (ML) INTRAVENOUS ONCE
Qty: 0 | Refills: 0 | Status: DISCONTINUED | OUTPATIENT
Start: 2018-01-18 | End: 2018-01-22

## 2018-01-18 RX ORDER — IPRATROPIUM/ALBUTEROL SULFATE 18-103MCG
3 AEROSOL WITH ADAPTER (GRAM) INHALATION EVERY 6 HOURS
Qty: 0 | Refills: 0 | Status: DISCONTINUED | OUTPATIENT
Start: 2018-01-18 | End: 2018-01-22

## 2018-01-18 RX ORDER — LIDOCAINE 4 G/100G
1 CREAM TOPICAL
Qty: 0 | Refills: 0 | COMMUNITY

## 2018-01-18 RX ORDER — AMLODIPINE BESYLATE 2.5 MG/1
5 TABLET ORAL DAILY
Qty: 0 | Refills: 0 | Status: DISCONTINUED | OUTPATIENT
Start: 2018-01-18 | End: 2018-01-22

## 2018-01-18 RX ORDER — SODIUM CHLORIDE 9 MG/ML
1000 INJECTION, SOLUTION INTRAVENOUS
Qty: 0 | Refills: 0 | Status: DISCONTINUED | OUTPATIENT
Start: 2018-01-18 | End: 2018-01-22

## 2018-01-18 RX ORDER — METFORMIN HYDROCHLORIDE 850 MG/1
2 TABLET ORAL
Qty: 0 | Refills: 0 | COMMUNITY

## 2018-01-18 RX ADMIN — Medication 1: at 09:40

## 2018-01-18 RX ADMIN — Medication 1: at 18:07

## 2018-01-18 RX ADMIN — SIMVASTATIN 20 MILLIGRAM(S): 20 TABLET, FILM COATED ORAL at 21:39

## 2018-01-18 RX ADMIN — AMLODIPINE BESYLATE 5 MILLIGRAM(S): 2.5 TABLET ORAL at 06:34

## 2018-01-18 RX ADMIN — HEPARIN SODIUM 5000 UNIT(S): 5000 INJECTION INTRAVENOUS; SUBCUTANEOUS at 18:12

## 2018-01-18 NOTE — PROGRESS NOTE ADULT - PROBLEM SELECTOR PLAN 3
On home on benzapril.   - c/w amlodipine for now  - BPs acceptable for now  - will reinstate equivalent ACEI Continue with supportive care   - guaifenesin, nasal saline, duonebs PRN.

## 2018-01-18 NOTE — PROGRESS NOTE ADULT - ASSESSMENT
87F h/o HTN, DM2, breast ca s/p mastectomy, active pulmonary TB (dx 1955 s/p tx), PE s/p enoxaparin (2013) here with cough / hemoptysis x5 days concerning for recurrent TB.  Also found to be RSV+.

## 2018-01-18 NOTE — CONSULT NOTE ADULT - PROBLEM SELECTOR RECOMMENDATION 9
Pt has a hx of TB in the very distant past dating back to 1955 for which she and her mother were treated for 9 months; pt also worked up for TB in July 2016 w/ bronch and 3 AFB sputum cultures, which were negative.  Pt currently complaining of productive cough w/ hemoptysis.  Pulmonary hemoptysis has a broad ddx,  including pulmonary AVM, bronchiectasis and other parenchymal abnormalities, chronic bronchitis, cancer of primary or metastatic origin, PE, increased pulmonary pressures, pulmonary infections, coagulopathy; PE has been ruled out via CTA, coags are WNL, pt is not thrombocytopenic or on AC but pt has RSV w/ cavitations and bronchiectasis, which is more likely the cause; less concerned about TB at this point  - f/u Quantiferon gold  - f/u 3 sputum cultures for AFB  - consider fungal work up w/ Fungitell/beta-glucan given hx of bronchiectasis and cavitation  - trend CBC and maintain active type and screen - transfuse as needed  - consider pulm consult if pt continues to have significant hemoptysis - may need bronchoscopic intervention for other causes aside from TB

## 2018-01-18 NOTE — PROGRESS NOTE ADULT - PROBLEM SELECTOR PLAN 7
- High risk of DVT (improve score 3, 7% symptomatic VTE risk  - will initiate heparin SQ for ppx  - Diet carb consistent

## 2018-01-18 NOTE — PROGRESS NOTE ADULT - PROBLEM SELECTOR PLAN 2
Supportive care - guaifenesin, nasal saline, duonebs PRN. Patient with history of active pulmonary Tb s/p 9 months of tx in 1955 in [facility]. Immigrated from [place]; mother also had Tb. Patient now with cavitary lesion in DIMITRY and currently with hemotpysis raising concern for Tb recurrence  - collect sputum x 3 with AFB stain; hypersal for sputum induction if needed  - if neg, may need to consider biopsy of DIMITRY lesion  - hold off on abx as per ID for now  - c/w isolation precautions until confirmation of 3 neg sputums

## 2018-01-18 NOTE — PROGRESS NOTE ADULT - SUBJECTIVE AND OBJECTIVE BOX
Christian Jones, PGY1  Pager: [phone]  After 7: Night Float pager  Alternate contact:     Patient is a 87y old  Female who presents with a chief complaint of cough, hemoptysis (17 Jan 2018 23:55)      SUBJECTIVE / OVERNIGHT EVENTS:    MEDICATIONS  (STANDING):  amLODIPine   Tablet 5 milliGRAM(s) Oral daily  dextrose 5%. 1000 milliLiter(s) (50 mL/Hr) IV Continuous <Continuous>  dextrose 50% Injectable 12.5 Gram(s) IV Push once  dextrose 50% Injectable 25 Gram(s) IV Push once  dextrose 50% Injectable 25 Gram(s) IV Push once  insulin lispro (HumaLOG) corrective regimen sliding scale   SubCutaneous three times a day before meals  insulin lispro (HumaLOG) corrective regimen sliding scale   SubCutaneous at bedtime  simvastatin 20 milliGRAM(s) Oral at bedtime    MEDICATIONS  (PRN):  acetaminophen   Tablet 650 milliGRAM(s) Oral every 6 hours PRN For Temp greater than 38 C (100.4 F)  ALBUTerol/ipratropium for Nebulization 3 milliLiter(s) Nebulizer every 6 hours PRN Shortness of Breath and/or Wheezing  dextrose Gel 1 Dose(s) Oral once PRN Blood Glucose LESS THAN 70 milliGRAM(s)/deciliter  glucagon  Injectable 1 milliGRAM(s) IntraMuscular once PRN Glucose LESS THAN 70 milligrams/deciliter  guaiFENesin   Syrup  (Sugar-Free) 200 milliGRAM(s) Oral every 6 hours PRN Cough  sodium chloride 0.65% Nasal 1 Spray(s) Both Nostrils four times a day PRN Nasal Congestion      Vital Signs Last 24 Hrs  T(C): 36.7 (18 Jan 2018 05:27), Max: 37.4 (17 Jan 2018 19:47)  T(F): 98.1 (18 Jan 2018 05:27), Max: 99.3 (17 Jan 2018 19:47)  HR: 78 (18 Jan 2018 05:27) (67 - 80)  BP: 122/76 (18 Jan 2018 05:27) (122/76 - 163/82)  BP(mean): --  RR: 18 (18 Jan 2018 05:27) (18 - 20)  SpO2: 94% (18 Jan 2018 05:27) (94% - 100%)  CAPILLARY BLOOD GLUCOSE        I&O's Summary      PHYSICAL EXAM:    LABS:                        12.4   6.3   )-----------( 248      ( 18 Jan 2018 07:22 )             36.7     01-17    143  |  103  |  14  ----------------------------<  180<H>  3.8   |  25  |  0.61    Ca    9.0      17 Jan 2018 15:37    TPro  7.2  /  Alb  4.1  /  TBili  0.1<L>  /  DBili  x   /  AST  17  /  ALT  14  /  AlkPhos  69  01-17    PT/INR - ( 17 Jan 2018 15:37 )   PT: 11.4 sec;   INR: 1.05 ratio         PTT - ( 17 Jan 2018 15:37 )  PTT:30.2 sec  CARDIAC MARKERS ( 17 Jan 2018 15:37 )  x     / <0.01 ng/mL / 51 U/L / x     / 1.9 ng/mL          RADIOLOGY & ADDITIONAL TESTS:    Imaging Personally Reviewed:    < from: CT Angio Chest w/ IV Cont (01.17.18 @ 17:51) >  IMPRESSION:   No pulmonary embolus.    Stable left medial upper lobe lesion with multiple foci of air    suggestive of cavitation. Progressive left upper lobe bronchiectasis.   Findings may represent progressive TB.    < end of copied text >  < from: CT Angio Chest w/ IV Cont (01.17.18 @ 17:51) >  LUNGS AND LARGE AIRWAYS: Patent central airways. Progressive   bronchiectasis medial left upper lobe. There is unchanged 2.1 x 1.5 x 4.6   cm left medial upper lobe lesion containing multiple foci of air   suggesting cavitation. Bibasilar and lingular subsegmental atelectasis.   Stable 2 mm left lower lung pulmonary nodule (series 2 image 89).    < end of copied text >      Consultant(s) Notes Reviewed:      Care Discussed with Consultants/Other Providers: Christian Jones, PGY1  Pager: [phone]  After 7: Night Float pager  Alternate contact:     Patient is a 87y old  Female who presents with a chief complaint of cough, hemoptysis (17 Jan 2018 23:55)      SUBJECTIVE / OVERNIGHT EVENTS:    MEDICATIONS  (STANDING):  amLODIPine   Tablet 5 milliGRAM(s) Oral daily  dextrose 5%. 1000 milliLiter(s) (50 mL/Hr) IV Continuous <Continuous>  dextrose 50% Injectable 12.5 Gram(s) IV Push once  dextrose 50% Injectable 25 Gram(s) IV Push once  dextrose 50% Injectable 25 Gram(s) IV Push once  insulin lispro (HumaLOG) corrective regimen sliding scale   SubCutaneous three times a day before meals  insulin lispro (HumaLOG) corrective regimen sliding scale   SubCutaneous at bedtime  simvastatin 20 milliGRAM(s) Oral at bedtime    MEDICATIONS  (PRN):  acetaminophen   Tablet 650 milliGRAM(s) Oral every 6 hours PRN For Temp greater than 38 C (100.4 F)  ALBUTerol/ipratropium for Nebulization 3 milliLiter(s) Nebulizer every 6 hours PRN Shortness of Breath and/or Wheezing  dextrose Gel 1 Dose(s) Oral once PRN Blood Glucose LESS THAN 70 milliGRAM(s)/deciliter  glucagon  Injectable 1 milliGRAM(s) IntraMuscular once PRN Glucose LESS THAN 70 milligrams/deciliter  guaiFENesin   Syrup  (Sugar-Free) 200 milliGRAM(s) Oral every 6 hours PRN Cough  sodium chloride 0.65% Nasal 1 Spray(s) Both Nostrils four times a day PRN Nasal Congestion      Vital Signs Last 24 Hrs  T(C): 36.7 (18 Jan 2018 05:27), Max: 37.4 (17 Jan 2018 19:47)  T(F): 98.1 (18 Jan 2018 05:27), Max: 99.3 (17 Jan 2018 19:47)  HR: 78 (18 Jan 2018 05:27) (67 - 80)  BP: 122/76 (18 Jan 2018 05:27) (122/76 - 163/82)  BP(mean): --  RR: 18 (18 Jan 2018 05:27) (18 - 20)  SpO2: 94% (18 Jan 2018 05:27) (94% - 100%)  CAPILLARY BLOOD GLUCOSE        I&O's Summary      PHYSICAL EXAM:  General: NAD, resting in bed comfortably. +cough with bloody sputum  HEENT: NCAT. PERRL.  Clear scleral. Moist MM.  No oropharyngeal exudates.    Neck: Supple. No JVD. No lymphadenopathy.   Heart: RRR.  Normal S1 and S2.  No murmurs   Lungs: rhonchi bilaterallu  Abdomen: BS+, soft, NT/ND.    Skin: Warm and dry.  No rashes.  Extremities: 2+ peripheral pulses b/l. No edema  Musculoskeletal: No deformities.  No spinal or paraspinal tenderness.  Neuro: A&Ox3.            LABS:                        12.4   6.3   )-----------( 248      ( 18 Jan 2018 07:22 )             36.7     01-17    143  |  103  |  14  ----------------------------<  180<H>  3.8   |  25  |  0.61    Ca    9.0      17 Jan 2018 15:37    TPro  7.2  /  Alb  4.1  /  TBili  0.1<L>  /  DBili  x   /  AST  17  /  ALT  14  /  AlkPhos  69  01-17    PT/INR - ( 17 Jan 2018 15:37 )   PT: 11.4 sec;   INR: 1.05 ratio         PTT - ( 17 Jan 2018 15:37 )  PTT:30.2 sec  CARDIAC MARKERS ( 17 Jan 2018 15:37 )  x     / <0.01 ng/mL / 51 U/L / x     / 1.9 ng/mL          RADIOLOGY & ADDITIONAL TESTS:    Imaging Personally Reviewed:    < from: CT Angio Chest w/ IV Cont (01.17.18 @ 17:51) >  IMPRESSION:   No pulmonary embolus.    Stable left medial upper lobe lesion with multiple foci of air    suggestive of cavitation. Progressive left upper lobe bronchiectasis.   Findings may represent progressive TB.    < end of copied text >  < from: CT Angio Chest w/ IV Cont (01.17.18 @ 17:51) >  LUNGS AND LARGE AIRWAYS: Patent central airways. Progressive   bronchiectasis medial left upper lobe. There is unchanged 2.1 x 1.5 x 4.6   cm left medial upper lobe lesion containing multiple foci of air   suggesting cavitation. Bibasilar and lingular subsegmental atelectasis.   Stable 2 mm left lower lung pulmonary nodule (series 2 image 89).    < end of copied text >      Consultant(s) Notes Reviewed:      Care Discussed with Consultants/Other Providers:  infectious disease Christian Jones, PGY1  Pager: [phone]  After 7: Night Float pager  Alternate contact:     Patient is a 87y old  Female who presents with a chief complaint of cough, hemoptysis (17 Jan 2018 23:55)      SUBJECTIVE / OVERNIGHT EVENTS:  Patietn admitted yesterday.  Patient states stil with productive cough, slight headache.  No current fevers, chills, SOB, abdominal pain, chest pain, n/v/d/c/ dysuria.     MEDICATIONS  (STANDING):  amLODIPine   Tablet 5 milliGRAM(s) Oral daily  dextrose 5%. 1000 milliLiter(s) (50 mL/Hr) IV Continuous <Continuous>  dextrose 50% Injectable 12.5 Gram(s) IV Push once  dextrose 50% Injectable 25 Gram(s) IV Push once  dextrose 50% Injectable 25 Gram(s) IV Push once  insulin lispro (HumaLOG) corrective regimen sliding scale   SubCutaneous three times a day before meals  insulin lispro (HumaLOG) corrective regimen sliding scale   SubCutaneous at bedtime  simvastatin 20 milliGRAM(s) Oral at bedtime    MEDICATIONS  (PRN):  acetaminophen   Tablet 650 milliGRAM(s) Oral every 6 hours PRN For Temp greater than 38 C (100.4 F)  ALBUTerol/ipratropium for Nebulization 3 milliLiter(s) Nebulizer every 6 hours PRN Shortness of Breath and/or Wheezing  dextrose Gel 1 Dose(s) Oral once PRN Blood Glucose LESS THAN 70 milliGRAM(s)/deciliter  glucagon  Injectable 1 milliGRAM(s) IntraMuscular once PRN Glucose LESS THAN 70 milligrams/deciliter  guaiFENesin   Syrup  (Sugar-Free) 200 milliGRAM(s) Oral every 6 hours PRN Cough  sodium chloride 0.65% Nasal 1 Spray(s) Both Nostrils four times a day PRN Nasal Congestion      Vital Signs Last 24 Hrs  T(C): 36.7 (18 Jan 2018 05:27), Max: 37.4 (17 Jan 2018 19:47)  T(F): 98.1 (18 Jan 2018 05:27), Max: 99.3 (17 Jan 2018 19:47)  HR: 78 (18 Jan 2018 05:27) (67 - 80)  BP: 122/76 (18 Jan 2018 05:27) (122/76 - 163/82)  BP(mean): --  RR: 18 (18 Jan 2018 05:27) (18 - 20)  SpO2: 94% (18 Jan 2018 05:27) (94% - 100%)  CAPILLARY BLOOD GLUCOSE        I&O's Summary      PHYSICAL EXAM:  General: NAD, resting in bed comfortably. +cough with bloody sputum  HEENT: NCAT. PERRL.  Clear scleral. Moist MM.  No oropharyngeal exudates.    Neck: Supple. No JVD. No lymphadenopathy.   Heart: RRR.  Normal S1 and S2.  No murmurs   Lungs: rhonchi bilaterallu  Abdomen: BS+, soft, NT/ND.    Skin: Warm and dry.  No rashes.  Extremities: 2+ peripheral pulses b/l. No edema  Musculoskeletal: No deformities.  No spinal or paraspinal tenderness.  Neuro: A&Ox3.            LABS:                        12.4   6.3   )-----------( 248      ( 18 Jan 2018 07:22 )             36.7     01-17    143  |  103  |  14  ----------------------------<  180<H>  3.8   |  25  |  0.61    Ca    9.0      17 Jan 2018 15:37    TPro  7.2  /  Alb  4.1  /  TBili  0.1<L>  /  DBili  x   /  AST  17  /  ALT  14  /  AlkPhos  69  01-17    PT/INR - ( 17 Jan 2018 15:37 )   PT: 11.4 sec;   INR: 1.05 ratio         PTT - ( 17 Jan 2018 15:37 )  PTT:30.2 sec  CARDIAC MARKERS ( 17 Jan 2018 15:37 )  x     / <0.01 ng/mL / 51 U/L / x     / 1.9 ng/mL          RADIOLOGY & ADDITIONAL TESTS:    Imaging Personally Reviewed:    < from: CT Angio Chest w/ IV Cont (01.17.18 @ 17:51) >  IMPRESSION:   No pulmonary embolus.    Stable left medial upper lobe lesion with multiple foci of air    suggestive of cavitation. Progressive left upper lobe bronchiectasis.   Findings may represent progressive TB.    < end of copied text >  < from: CT Angio Chest w/ IV Cont (01.17.18 @ 17:51) >  LUNGS AND LARGE AIRWAYS: Patent central airways. Progressive   bronchiectasis medial left upper lobe. There is unchanged 2.1 x 1.5 x 4.6   cm left medial upper lobe lesion containing multiple foci of air   suggesting cavitation. Bibasilar and lingular subsegmental atelectasis.   Stable 2 mm left lower lung pulmonary nodule (series 2 image 89).    < end of copied text >      Consultant(s) Notes Reviewed:      Care Discussed with Consultants/Other Providers:  infectious disease

## 2018-01-18 NOTE — CONSULT NOTE ADULT - ASSESSMENT
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

## 2018-01-18 NOTE — PROGRESS NOTE ADULT - PROBLEM SELECTOR PLAN 5
Remote hx of PE in 2013. Previously anticoagulated on enoxaparin  - now off NOAC therapy  - will further hld On metformin 1000 BID at home  - will cover with ISS qAC and qhs   - FSG acceptable for now

## 2018-01-18 NOTE — PROGRESS NOTE ADULT - PROBLEM SELECTOR PLAN 4
On metformin 1000 BID at home  - will cover with ISS qAC and qhs   - FSG acceptable for now On home on benzapril.   - c/w amlodipine for now; resume home lisinopril 20 mg (therapeutic xchange)  - BPs acceptable for now

## 2018-01-18 NOTE — CONSULT NOTE ADULT - SUBJECTIVE AND OBJECTIVE BOX
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    Patient reports that she started having coughing on Saturday, along with rhinorrhea, sore throat, hoarseness.  On Sunday, she began to develop subjective fever, as well as bloody cough.  She states that she has been coughing non-stop, with occasional bloody output.  She reports feeling well prior to this without weight loss, hemoptysis, fevers.    In the ED, Tmax 99.3F, HR 69-80, -163/71-82, SpO2 97% RA.  Patient noted to have graciela hemoptysis in ED.  Quant gold obtained.  WBC 6.5, Hgb 13.0, plt 287, INR 1.05, Na 143, K 3.8, Cr 0.61, T bili 0.1.  RVP+ for RSV.  CXR no focal consolidation.  CTA chest no PE, stable DIMITRY lesion with cavitation, as well as progressive DIMITRY bronchiectasis.    Regarding her TB history, the patient was diagnosed in 1955 and underwent 9 months of treatment at [facility].  She states that her mother also had TB.  Her children do not. (17 Jan 2018 23:55)    As per pt:     As per pt, has not had any weight loss over the last several months and has actually had a very good appetite; endorses 1 brief episode of mild night sweats, but nothing that repeated over days; pt only had f/c before coming to the hospital, but does not endorses periods of fever over the last several months    Pt denies sick contacts, recent travel    has had previous investigation in July 2016 for TB; bronchoscopy with AFB stain/culture was negative and 3 sputum cultures for AFB were also negative at this time      REVIEW OF SYSTEMS  All review of systems negative, except for those marked:  General:		[] Abnormal:  	[] Night Sweats		[] Fever		[] Weight Loss  Pulmonary/Cough:	[x] Abnormal: w/ phlegm and hemoptysis  Cardiac/Chest Pain:	[] Abnormal:  Gastrointestinal:	[] Abnormal:  Eyes:			[] Abnormal:  ENT:			[] Abnormal:  Dysuria:		[] Abnormal:  Musculoskeletal	:	[] Abnormal:  Endocrine:		[] Abnormal:  Lymph Nodes:		[] Abnormal:  Headache:		[] Abnormal:  Skin:			[] Abnormal:  Allergy/Immune:	[] Abnormal:  Psychiatric:		[] Abnormal:  [x] All other review of systems negative  [] Unable to obtain (explain):    Recent Ill Contacts:	[x] No	[] Yes:  Recent Travel History:	[x] No	[] Yes:    Allergies    No Known Allergies    Intolerances    Antimicrobials:    Other Medications:  acetaminophen   Tablet 650 milliGRAM(s) Oral every 6 hours PRN  ALBUTerol/ipratropium for Nebulization 3 milliLiter(s) Nebulizer every 6 hours PRN  amLODIPine   Tablet 5 milliGRAM(s) Oral daily  dextrose 5%. 1000 milliLiter(s) IV Continuous <Continuous>  dextrose 50% Injectable 12.5 Gram(s) IV Push once  dextrose 50% Injectable 25 Gram(s) IV Push once  dextrose 50% Injectable 25 Gram(s) IV Push once  dextrose Gel 1 Dose(s) Oral once PRN  glucagon  Injectable 1 milliGRAM(s) IntraMuscular once PRN  guaiFENesin   Syrup  (Sugar-Free) 200 milliGRAM(s) Oral every 6 hours PRN  insulin lispro (HumaLOG) corrective regimen sliding scale   SubCutaneous three times a day before meals  insulin lispro (HumaLOG) corrective regimen sliding scale   SubCutaneous at bedtime  simvastatin 20 milliGRAM(s) Oral at bedtime  sodium chloride 0.65% Nasal 1 Spray(s) Both Nostrils four times a day PRN    FAMILY HISTORY:  Family history of active pulmonary tuberculosis in patient's mother (Mother)  Family history of breast cancer (Child): 2nd daughter    PAST MEDICAL & SURGICAL HISTORY:  Pulmonary embolism: 2012  Tuberculosis: 1956  Hyperlipidemia  Diabetes: type II  Hypertension  Metastatic carcinoma  Breast cancer  H/O left mastectomy  H/O abdominal hysterectomy    SOCIAL HISTORY:    IMMUNIZATIONS  [] Up to Date		[] Not Up to Date:  Recent Immunizations:	[] No	[] Yes:    Daily Height in cm: 152.4 (18 Jan 2018 01:05)    Daily   Head Circumference:  Vital Signs Last 24 Hrs  T(C): 36.7 (18 Jan 2018 05:27), Max: 37.4 (17 Jan 2018 19:47)  T(F): 98.1 (18 Jan 2018 05:27), Max: 99.3 (17 Jan 2018 19:47)  HR: 78 (18 Jan 2018 05:27) (67 - 80)  BP: 122/76 (18 Jan 2018 05:27) (122/76 - 163/82)  RR: 18 (18 Jan 2018 05:27) (18 - 20)  SpO2: 94% (18 Jan 2018 05:27) (94% - 100%)    PHYSICAL EXAM  All physical exam findings normal, except for those marked:  General:	Normal: alert, neither acutely nor chronically ill-appearing, well developed/well   .		nourished, no respiratory distress  .		[] Abnormal:  Eyes		Normal: no conjunctival injection, no discharge, no photophobia, intact   .		extraocular movements, sclera not icteric  .		[] Abnormal:  ENT:		Normal: normal tympanic membranes; external ear normal, nares normal without   .		discharge, no pharyngeal erythema or exudates, no oral mucosal lesions, normal   .		tongue and lips  .		[] Abnormal:  Neck		Normal: supple, full range of motion, no nuchal rigidity  .		[] Abnormal:  Lymph Nodes	Normal: normal size and consistency, non-tender  .		[] Abnormal:  Cardiovascular	Normal: regular rate and variability; Normal S1, S2; No murmur  .		[] Abnormal:  Respiratory	Normal: no wheezing or crackles, bilateral audible breath sounds, no retractions  .		[] Abnormal:  Abdominal	Normal: soft; non-distended; non-tender; no hepatosplenomegaly or masses  .		[] Abnormal:  		Normal: normal external genitalia, no rash  .		[] Abnormal:  Extremities	Normal: FROM x4, no cyanosis or edema, symmetric pulses  .		[] Abnormal:  Skin		Normal: skin intact and not indurated; no rash, no desquamation  .		[] Abnormal:  Neurologic	Normal: alert, oriented as age-appropriate, affect appropriate; no weakness, no   .		facial asymmetry, moves all extremities, normal gait-child older than 18 months  .		[] Abnormal:  Musculoskeletal		Normal: no joint swelling, erythema, or tenderness; full range of motion   .			with no contractures; no muscle tenderness; no clubbing; no cyanosis;   .			no edema  .			[] Abnormal    Respiratory Support:		[] No	[] Yes:  Vasoactive medication infusion:	[] No	[] Yes:  Venous catheters:		[] No	[] Yes:  Bladder catheter:		[] No	[] Yes:  Other catheters or tubes:	[] No	[] Yes:    Lab Results:                        12.4   6.3   )-----------( 248      ( 18 Jan 2018 07:22 )             36.7     01-18    142  |  104  |  14  ----------------------------<  145<H>  3.6   |  27  |  0.70    Ca    8.5      18 Jan 2018 07:22  Phos  3.4     01-18  Mg     1.8     01-18    TPro  6.5  /  Alb  3.6  /  TBili  0.2  /  DBili  x   /  AST  15  /  ALT  13  /  AlkPhos  55  01-18    LIVER FUNCTIONS - ( 18 Jan 2018 07:22 )  Alb: 3.6 g/dL / Pro: 6.5 g/dL / ALK PHOS: 55 U/L / ALT: 13 U/L RC / AST: 15 U/L / GGT: x           PT/INR - ( 17 Jan 2018 15:37 )   PT: 11.4 sec;   INR: 1.05 ratio         PTT - ( 17 Jan 2018 15:37 )  PTT:30.2 sec      MICROBIOLOGY    RVP positive for RSV Patient is a 87y old  Female who presents with a chief complaint of cough, hemoptysis (17 Jan 2018 23:55)    HPI:  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    Patient reports that she started having coughing on Saturday, along with rhinorrhea, sore throat, hoarseness.  On Sunday, she began to develop subjective fever, as well as bloody cough.  She states that she has been coughing non-stop, with occasional bloody output.  She reports feeling well prior to this without weight loss, hemoptysis, fevers.    In the ED, Tmax 99.3F, HR 69-80, -163/71-82, SpO2 97% RA.  Patient noted to have graciela hemoptysis in ED.  Quant gold obtained.  WBC 6.5, Hgb 13.0, plt 287, INR 1.05, Na 143, K 3.8, Cr 0.61, T bili 0.1.  RVP+ for RSV.  CXR no focal consolidation.  CTA chest no PE, stable DIMITRY lesion with cavitation, as well as progressive DIMITRY bronchiectasis.    Regarding her TB history, the patient was diagnosed in 1955 and underwent 9 months of treatment at [facility].  She states that her mother also had TB.  Her children do not. (17 Jan 2018 23:55)    As per pt:     As per pt, has not had any weight loss over the last several months and has actually had a very good appetite; endorses 1 brief episode of mild night sweats, but nothing that repeated over days; pt only had f/c before coming to the hospital, but does not endorses periods of fever over the last several months    Pt denies sick contacts, recent travel    has had previous investigation in July 2016 for TB; bronchoscopy with AFB stain/culture was negative and 3 sputum cultures for AFB were also negative at this time      REVIEW OF SYSTEMS  All review of systems negative, except for those marked:  General:		  	[] Night Sweats		[] Fever		[] Weight Loss  Pulmonary/ XCough:	[x] Abnormal: w/ phlegm and hemoptysis  Cardiac/Chest Pain:	[] Abnormal:  Gastrointestinal:	[] Abnormal:  Eyes:			[] Abnormal:  ENT:			[] Abnormal:  Dysuria:		[] Abnormal:  Musculoskeletal	:	[] Abnormal:  Endocrine:		[] Abnormal:  Lymph Nodes:		[] Abnormal:  Headache:		[] Abnormal:  Skin:			[] Abnormal:  Allergy/Immune:	[] Abnormal:  Psychiatric:		[] Abnormal:  [x] All other review of systems negative  [] Unable to obtain (explain):    Recent Ill Contacts:	[x] No	[] Yes:  Recent Travel History:	[x] No	[] Yes:    Allergies    No Known Allergies    Intolerances    Antimicrobials:    Other Medications:  acetaminophen   Tablet 650 milliGRAM(s) Oral every 6 hours PRN  ALBUTerol/ipratropium for Nebulization 3 milliLiter(s) Nebulizer every 6 hours PRN  amLODIPine   Tablet 5 milliGRAM(s) Oral daily  dextrose 5%. 1000 milliLiter(s) IV Continuous <Continuous>  dextrose 50% Injectable 12.5 Gram(s) IV Push once  dextrose 50% Injectable 25 Gram(s) IV Push once  dextrose 50% Injectable 25 Gram(s) IV Push once  dextrose Gel 1 Dose(s) Oral once PRN  glucagon  Injectable 1 milliGRAM(s) IntraMuscular once PRN  guaiFENesin   Syrup  (Sugar-Free) 200 milliGRAM(s) Oral every 6 hours PRN  insulin lispro (HumaLOG) corrective regimen sliding scale   SubCutaneous three times a day before meals  insulin lispro (HumaLOG) corrective regimen sliding scale   SubCutaneous at bedtime  simvastatin 20 milliGRAM(s) Oral at bedtime  sodium chloride 0.65% Nasal 1 Spray(s) Both Nostrils four times a day PRN    FAMILY HISTORY:  Family history of active pulmonary tuberculosis in patient's mother (Mother)  Family history of breast cancer (Child): 2nd daughter    PAST MEDICAL & SURGICAL HISTORY:  Pulmonary embolism: 2012  Tuberculosis: 1956  Hyperlipidemia  Diabetes: type II  Hypertension  Metastatic carcinoma  Breast cancer  H/O left mastectomy  H/O abdominal hysterectomy    SOCIAL HISTORY:    IMMUNIZATIONS  [] Up to Date		[] Not Up to Date:  Recent Immunizations:	[] No	[] Yes:    Daily Height in cm: 152.4 (18 Jan 2018 01:05)    Daily   Head Circumference:  Vital Signs Last 24 Hrs  T(C): 36.7 (18 Jan 2018 05:27), Max: 37.4 (17 Jan 2018 19:47)  T(F): 98.1 (18 Jan 2018 05:27), Max: 99.3 (17 Jan 2018 19:47)  HR: 78 (18 Jan 2018 05:27) (67 - 80)  BP: 122/76 (18 Jan 2018 05:27) (122/76 - 163/82)  RR: 18 (18 Jan 2018 05:27) (18 - 20)  SpO2: 94% (18 Jan 2018 05:27) (94% - 100%)    PHYSICAL EXAM  All physical exam findings normal, except for those marked:  General:	Normal: alert, neither acutely nor chronically ill-appearing, well developed/well   .		nourished, no respiratory distress  .		[] Abnormal:  Eyes		Normal: no conjunctival injection, no discharge, no photophobia, intact   .		extraocular movements, sclera not icteric  .		[] Abnormal:  ENT:		Normal: normal tympanic membranes; external ear normal, nares normal without   .		discharge, no pharyngeal erythema or exudates, no oral mucosal lesions, normal   .		tongue and lips  .		[] Abnormal:  Neck		Normal: supple, full range of motion, no nuchal rigidity  .		[] Abnormal:  Lymph Nodes	Normal: normal size and consistency, non-tender  .		[] Abnormal:  Cardiovascular	Normal: regular rate and variability; Normal S1, S2; No murmur  .		[] Abnormal:  Respiratory	Normal: b/l coarse breath sounds, no wheezing or crackles, bilateral audible breath sounds, no retractions  .		[] Abnormal:  Abdominal	Normal: soft; non-distended; non-tender; no hepatosplenomegaly or masses  .		[] Abnormal:  		Normal: normal external genitalia, no rash  .		[] Abnormal:  Extremities	Normal: FROM x4, no cyanosis or edema, symmetric pulses  .		[] Abnormal:  Skin		Normal: skin intact and not indurated; no rash, no desquamation  .		[] Abnormal:  Neurologic	Normal: alert, oriented as age-appropriate, affect appropriate; no weakness, no   .		facial asymmetry, moves all extremities, normal gait-child older than 18 months  .		[] Abnormal:  Musculoskeletal		Normal: no joint swelling, erythema, or tenderness; full range of motion   .			with no contractures; no muscle tenderness; no clubbing; no cyanosis;   .			no edema  .			[] Abnormal    Respiratory Support:		[] No	[] Yes:  Vasoactive medication infusion:	[] No	[] Yes:  Venous catheters:		[] No	[] Yes:  Bladder catheter:		[] No	[] Yes:  Other catheters or tubes:	[] No	[] Yes:    Lab Results:                        12.4   6.3   )-----------( 248      ( 18 Jan 2018 07:22 )             36.7     01-18    142  |  104  |  14  ----------------------------<  145<H>  3.6   |  27  |  0.70    Ca    8.5      18 Jan 2018 07:22  Phos  3.4     01-18  Mg     1.8     01-18    TPro  6.5  /  Alb  3.6  /  TBili  0.2  /  DBili  x   /  AST  15  /  ALT  13  /  AlkPhos  55  01-18    LIVER FUNCTIONS - ( 18 Jan 2018 07:22 )  Alb: 3.6 g/dL / Pro: 6.5 g/dL / ALK PHOS: 55 U/L / ALT: 13 U/L RC / AST: 15 U/L / GGT: x           PT/INR - ( 17 Jan 2018 15:37 )   PT: 11.4 sec;   INR: 1.05 ratio         PTT - ( 17 Jan 2018 15:37 )  PTT:30.2 sec      MICROBIOLOGY    RVP positive for RSV    Radiology  < from: CT Angio Chest w/ IV Cont (01.17.18 @ 17:51) >  No pulmonary embolus.    Stable left medial upper lobe lesion with multiple foci of air    suggestive of cavitation. Progressive left upper lobe bronchiectasis.   Findings may represent progressive TB.

## 2018-01-18 NOTE — PROGRESS NOTE ADULT - PROBLEM SELECTOR PLAN 1
Concern for recurrent TB given hemoptysis and CTA with DIMITRY cavitary lesion. Patient with hx of active Tb s/p 9 months of tx at [facility].   - etiology may also be 2/2 bloody sputum 2/2 RSV+.  CTA no PE.  - AFB sputum to be obtained x3. Hypersal for sputum induction  - ID consulted Patient with 5 days of hemoptysis in setting of RSV. However with concern for Tb recurrence given hemoptysis given hx of Tb and presence of DIMITRY cavitary lesion. No evidence of PE   - c/w supportive tx for RSV  - currently in progress of ruling out active Tb  - less likely with pulmonary hemorrhage  - will avoid full dose AC for now  - maintain active t&s, trend Hb

## 2018-01-19 LAB
HCT VFR BLD CALC: 38 % — SIGNIFICANT CHANGE UP (ref 34.5–45)
HGB BLD-MCNC: 12.9 G/DL — SIGNIFICANT CHANGE UP (ref 11.5–15.5)
MCHC RBC-ENTMCNC: 29.3 PG — SIGNIFICANT CHANGE UP (ref 27–34)
MCHC RBC-ENTMCNC: 33.8 GM/DL — SIGNIFICANT CHANGE UP (ref 32–36)
MCV RBC AUTO: 86.6 FL — SIGNIFICANT CHANGE UP (ref 80–100)
NIGHT BLUE STAIN TISS: SIGNIFICANT CHANGE UP
PLATELET # BLD AUTO: 280 K/UL — SIGNIFICANT CHANGE UP (ref 150–400)
RBC # BLD: 4.39 M/UL — SIGNIFICANT CHANGE UP (ref 3.8–5.2)
RBC # FLD: 12.1 % — SIGNIFICANT CHANGE UP (ref 10.3–14.5)
SPECIMEN SOURCE: SIGNIFICANT CHANGE UP
WBC # BLD: 7.3 K/UL — SIGNIFICANT CHANGE UP (ref 3.8–10.5)
WBC # FLD AUTO: 7.3 K/UL — SIGNIFICANT CHANGE UP (ref 3.8–10.5)

## 2018-01-19 PROCEDURE — 99232 SBSQ HOSP IP/OBS MODERATE 35: CPT | Mod: GC

## 2018-01-19 PROCEDURE — 99233 SBSQ HOSP IP/OBS HIGH 50: CPT | Mod: GC

## 2018-01-19 RX ADMIN — Medication 1: at 13:19

## 2018-01-19 RX ADMIN — LISINOPRIL 20 MILLIGRAM(S): 2.5 TABLET ORAL at 06:43

## 2018-01-19 RX ADMIN — SIMVASTATIN 20 MILLIGRAM(S): 20 TABLET, FILM COATED ORAL at 22:38

## 2018-01-19 RX ADMIN — Medication 1: at 09:37

## 2018-01-19 RX ADMIN — AMLODIPINE BESYLATE 5 MILLIGRAM(S): 2.5 TABLET ORAL at 06:49

## 2018-01-19 RX ADMIN — HEPARIN SODIUM 5000 UNIT(S): 5000 INJECTION INTRAVENOUS; SUBCUTANEOUS at 06:43

## 2018-01-19 RX ADMIN — HEPARIN SODIUM 5000 UNIT(S): 5000 INJECTION INTRAVENOUS; SUBCUTANEOUS at 17:43

## 2018-01-19 NOTE — PROGRESS NOTE ADULT - PROBLEM SELECTOR PLAN 2
LMTC. Please inform pt of message below.    Patient with history of active pulmonary Tb s/p 9 months of tx in 1955 in [facility]. Immigrated from [place]; mother also had Tb. Patient now with cavitary lesion in DIMITRY and currently with hemotpysis raising concern for Tb recurrence  - collect sputum x 3 with AFB stain; hypersal for sputum induction if needed  - if neg, may need to consider biopsy of DIMITRY lesion  - hold off on abx as per ID for now  - c/w isolation precautions until confirmation of 3 neg sputums Noted to be stable from prior CTA in 2016. Hx of active pulmonary Tb s/p 9 months of tx in 1955 in [facility]. [place] [facility]; mother also had Tb.   - collect sputum x 3 with AFB stain; hypersal for sputum induction if needed  - if neg, may need to consider biopsy of DIMITRY lesion  - hold off on abx as per ID for now  - c/w isolation precautions until confirmation of 3 neg sputums

## 2018-01-19 NOTE — PROGRESS NOTE ADULT - ASSESSMENT
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

## 2018-01-19 NOTE — PROGRESS NOTE ADULT - ASSESSMENT
87F h/o HTN, DM2, breast ca s/p mastectomy, active pulmonary TB (dx 1955 s/p tx), PE s/p enoxaparin (2013) here with cough / hemoptysis x5 days in setting of RSV infection and CTA chest showing worsening DIMITRY bronchiectasis with stable cavitary lesion. Currently  recurrent TB.  Also found to be RSV+. 87F h/o HTN, DM2, breast ca s/p mastectomy, active pulmonary TB (dx 1955 s/p tx), PE s/p enoxaparin (2013) here with cough / hemoptysis x5 days in setting of RSV infection and CTA chest showing worsening DIMITRY bronchiectasis with stable cavitary lesion. Currently in progressing of ruling out recurrent Tb infection.

## 2018-01-19 NOTE — PROGRESS NOTE ADULT - SUBJECTIVE AND OBJECTIVE BOX
Christian Jones, PGY1  Pager: [phone]  After 7: Night Float pager  Alternate contact:     Patient is a 87y old  Female who presents with a chief complaint of cough, hemoptysis (17 Jan 2018 23:55)      SUBJECTIVE / OVERNIGHT EVENTS:  No acute events overnight. Endorsing cough with blood tinged sputum    MEDICATIONS  (STANDING):  amLODIPine   Tablet 5 milliGRAM(s) Oral daily  dextrose 5%. 1000 milliLiter(s) (50 mL/Hr) IV Continuous <Continuous>  dextrose 50% Injectable 12.5 Gram(s) IV Push once  dextrose 50% Injectable 25 Gram(s) IV Push once  dextrose 50% Injectable 25 Gram(s) IV Push once  heparin  Injectable 5000 Unit(s) SubCutaneous every 12 hours  insulin lispro (HumaLOG) corrective regimen sliding scale   SubCutaneous three times a day before meals  insulin lispro (HumaLOG) corrective regimen sliding scale   SubCutaneous at bedtime  lisinopril 20 milliGRAM(s) Oral daily  simvastatin 20 milliGRAM(s) Oral at bedtime    MEDICATIONS  (PRN):  acetaminophen   Tablet 650 milliGRAM(s) Oral every 6 hours PRN For Temp greater than 38 C (100.4 F)  ALBUTerol/ipratropium for Nebulization 3 milliLiter(s) Nebulizer every 6 hours PRN Shortness of Breath and/or Wheezing  dextrose Gel 1 Dose(s) Oral once PRN Blood Glucose LESS THAN 70 milliGRAM(s)/deciliter  glucagon  Injectable 1 milliGRAM(s) IntraMuscular once PRN Glucose LESS THAN 70 milligrams/deciliter  guaiFENesin   Syrup  (Sugar-Free) 200 milliGRAM(s) Oral every 6 hours PRN Cough  sodium chloride 0.65% Nasal 1 Spray(s) Both Nostrils four times a day PRN Nasal Congestion      Vital Signs Last 24 Hrs  T(C): 36.7 (19 Jan 2018 05:24), Max: 36.8 (18 Jan 2018 13:46)  T(F): 98.1 (19 Jan 2018 05:24), Max: 98.2 (18 Jan 2018 13:46)  HR: 69 (19 Jan 2018 05:24) (69 - 76)  BP: 146/54 (19 Jan 2018 05:24) (146/54 - 154/65)  BP(mean): --  RR: 18 (19 Jan 2018 05:24) (18 - 18)  SpO2: 95% (19 Jan 2018 05:24) (93% - 98%)  CAPILLARY BLOOD GLUCOSE      POCT Blood Glucose.: 189 mg/dL (19 Jan 2018 12:48)  POCT Blood Glucose.: 155 mg/dL (19 Jan 2018 08:39)  POCT Blood Glucose.: 178 mg/dL (18 Jan 2018 21:49)  POCT Blood Glucose.: 196 mg/dL (18 Jan 2018 17:38)    I&O's Summary      PHYSICAL EXAM:  GENERAL: NAD, well-developed  HEAD:  Atraumatic, Normocephalic  EYES: EOMI, PERRLA, conjunctiva and sclera clear  NECK: Supple, No JVD  CHEST/LUNG: Clear to auscultation bilaterally; No wheeze  HEART: Regular rate and rhythm; No murmurs, rubs, or gallops  ABDOMEN: Soft, Nontender, Nondistended; Bowel sounds present  EXTREMITIES:  2+ Peripheral Pulses, No clubbing, cyanosis, or edema  PSYCH: AAOx3  NEUROLOGY: non-focal  SKIN: No rashes or lesions    LABS:                        12.9   7.3   )-----------( 280      ( 19 Jan 2018 05:47 )             38.0     01-18    142  |  104  |  14  ----------------------------<  145<H>  3.6   |  27  |  0.70    Ca    8.5      18 Jan 2018 07:22  Phos  3.4     01-18  Mg     1.8     01-18    TPro  6.5  /  Alb  3.6  /  TBili  0.2  /  DBili  x   /  AST  15  /  ALT  13  /  AlkPhos  55  01-18    PT/INR - ( 17 Jan 2018 15:37 )   PT: 11.4 sec;   INR: 1.05 ratio         PTT - ( 17 Jan 2018 15:37 )  PTT:30.2 sec  CARDIAC MARKERS ( 17 Jan 2018 15:37 )  x     / <0.01 ng/mL / 51 U/L / x     / 1.9 ng/mL          RADIOLOGY & ADDITIONAL TESTS:    Imaging Personally Reviewed:    Consultant(s) Notes Reviewed:      Care Discussed with Consultants/Other Providers: Christian Jones, PGY1  Pager: [phone]  After 7: Night Float pager  Alternate contact:     Patient is a 87y old  Female who presents with a chief complaint of cough, hemoptysis (17 Jan 2018 23:55)      SUBJECTIVE / OVERNIGHT EVENTS:  No acute events overnight. Endorsing cough with blood tinged sputum    MEDICATIONS  (STANDING):  amLODIPine   Tablet 5 milliGRAM(s) Oral daily  dextrose 5%. 1000 milliLiter(s) (50 mL/Hr) IV Continuous <Continuous>  dextrose 50% Injectable 12.5 Gram(s) IV Push once  dextrose 50% Injectable 25 Gram(s) IV Push once  dextrose 50% Injectable 25 Gram(s) IV Push once  heparin  Injectable 5000 Unit(s) SubCutaneous every 12 hours  insulin lispro (HumaLOG) corrective regimen sliding scale   SubCutaneous three times a day before meals  insulin lispro (HumaLOG) corrective regimen sliding scale   SubCutaneous at bedtime  lisinopril 20 milliGRAM(s) Oral daily  simvastatin 20 milliGRAM(s) Oral at bedtime    MEDICATIONS  (PRN):  acetaminophen   Tablet 650 milliGRAM(s) Oral every 6 hours PRN For Temp greater than 38 C (100.4 F)  ALBUTerol/ipratropium for Nebulization 3 milliLiter(s) Nebulizer every 6 hours PRN Shortness of Breath and/or Wheezing  dextrose Gel 1 Dose(s) Oral once PRN Blood Glucose LESS THAN 70 milliGRAM(s)/deciliter  glucagon  Injectable 1 milliGRAM(s) IntraMuscular once PRN Glucose LESS THAN 70 milligrams/deciliter  guaiFENesin   Syrup  (Sugar-Free) 200 milliGRAM(s) Oral every 6 hours PRN Cough  sodium chloride 0.65% Nasal 1 Spray(s) Both Nostrils four times a day PRN Nasal Congestion      Vital Signs Last 24 Hrs  T(C): 36.7 (19 Jan 2018 05:24), Max: 36.8 (18 Jan 2018 13:46)  T(F): 98.1 (19 Jan 2018 05:24), Max: 98.2 (18 Jan 2018 13:46)  HR: 69 (19 Jan 2018 05:24) (69 - 76)  BP: 146/54 (19 Jan 2018 05:24) (146/54 - 154/65)  BP(mean): --  RR: 18 (19 Jan 2018 05:24) (18 - 18)  SpO2: 95% (19 Jan 2018 05:24) (93% - 98%)  CAPILLARY BLOOD GLUCOSE      POCT Blood Glucose.: 189 mg/dL (19 Jan 2018 12:48)  POCT Blood Glucose.: 155 mg/dL (19 Jan 2018 08:39)  POCT Blood Glucose.: 178 mg/dL (18 Jan 2018 21:49)  POCT Blood Glucose.: 196 mg/dL (18 Jan 2018 17:38)    I&O's Summary      PHYSICAL EXAM:  GENERAL: NAD  HEENT:  Mild pharyngeal erythema  EYES: clear sclera   NECK: No JVD  CHEST/LUNG: bilateral rhonchi  HEART: distant heart sounds. no appreciable murmur  ABDOMEN: Soft, Nontender, Nondistended; + BS  EXTREMITIES:  2+ Peripheral Pulses, No edema  PSYCH: AAOx3  NEUROLOGY: non-focal  SKIN: No rashes or lesions    LABS:                        12.9   7.3   )-----------( 280      ( 19 Jan 2018 05:47 )             38.0     01-18    142  |  104  |  14  ----------------------------<  145<H>  3.6   |  27  |  0.70    Ca    8.5      18 Jan 2018 07:22  Phos  3.4     01-18  Mg     1.8     01-18    TPro  6.5  /  Alb  3.6  /  TBili  0.2  /  DBili  x   /  AST  15  /  ALT  13  /  AlkPhos  55  01-18    PT/INR - ( 17 Jan 2018 15:37 )   PT: 11.4 sec;   INR: 1.05 ratio         PTT - ( 17 Jan 2018 15:37 )  PTT:30.2 sec  CARDIAC MARKERS ( 17 Jan 2018 15:37 )  x     / <0.01 ng/mL / 51 U/L / x     / 1.9 ng/mL          RADIOLOGY & ADDITIONAL TESTS:    Imaging Personally Reviewed:    Consultant(s) Notes Reviewed:      Care Discussed with Consultants/Other Providers: Christian Jones, PGY1  Pager: [phone]  After 7: Night Float pager  Alternate contact:     Patient is a 87y old  Female who presents with a chief complaint of cough, hemoptysis (17 Jan 2018 23:55)      SUBJECTIVE / OVERNIGHT EVENTS:  No acute events overnight. Endorsing cough with blood tinged sputum    MEDICATIONS  (STANDING):  amLODIPine   Tablet 5 milliGRAM(s) Oral daily  heparin  Injectable 5000 Unit(s) SubCutaneous every 12 hours  insulin lispro (HumaLOG) corrective regimen sliding scale   SubCutaneous three times a day before meals  insulin lispro (HumaLOG) corrective regimen sliding scale   SubCutaneous at bedtime  lisinopril 20 milliGRAM(s) Oral daily  simvastatin 20 milliGRAM(s) Oral at bedtime    MEDICATIONS  (PRN):  acetaminophen   Tablet 650 milliGRAM(s) Oral every 6 hours PRN For Temp greater than 38 C (100.4 F)  ALBUTerol/ipratropium for Nebulization 3 milliLiter(s) Nebulizer every 6 hours PRN Shortness of Breath and/or Wheezing  dextrose Gel 1 Dose(s) Oral once PRN Blood Glucose LESS THAN 70 milliGRAM(s)/deciliter  glucagon  Injectable 1 milliGRAM(s) IntraMuscular once PRN Glucose LESS THAN 70 milligrams/deciliter  guaiFENesin   Syrup  (Sugar-Free) 200 milliGRAM(s) Oral every 6 hours PRN Cough  sodium chloride 0.65% Nasal 1 Spray(s) Both Nostrils four times a day PRN Nasal Congestion      Vital Signs Last 24 Hrs  T(C): 36.7 (19 Jan 2018 05:24), Max: 36.8 (18 Jan 2018 13:46)  T(F): 98.1 (19 Jan 2018 05:24), Max: 98.2 (18 Jan 2018 13:46)  HR: 69 (19 Jan 2018 05:24) (69 - 76)  BP: 146/54 (19 Jan 2018 05:24) (146/54 - 154/65)  BP(mean): --  RR: 18 (19 Jan 2018 05:24) (18 - 18)  SpO2: 95% (19 Jan 2018 05:24) (93% - 98%)  CAPILLARY BLOOD GLUCOSE      POCT Blood Glucose.: 189 mg/dL (19 Jan 2018 12:48)  POCT Blood Glucose.: 155 mg/dL (19 Jan 2018 08:39)  POCT Blood Glucose.: 178 mg/dL (18 Jan 2018 21:49)  POCT Blood Glucose.: 196 mg/dL (18 Jan 2018 17:38)    I&O's Summary      PHYSICAL EXAM:  GENERAL: NAD  HEENT:  Mild pharyngeal erythema  EYES: clear sclera   NECK: No JVD  CHEST/LUNG: bilateral rhonchi  HEART: distant heart sounds. no appreciable murmur  ABDOMEN: Soft, Nontender, Nondistended; + BS  EXTREMITIES:  2+ Peripheral Pulses, No edema  PSYCH: AAOx3  NEUROLOGY: non-focal  SKIN: No rashes or lesions    LABS:                        12.9   7.3   )-----------( 280      ( 19 Jan 2018 05:47 )             38.0     01-18    142  |  104  |  14  ----------------------------<  145<H>  3.6   |  27  |  0.70    Ca    8.5      18 Jan 2018 07:22  Phos  3.4     01-18  Mg     1.8     01-18    TPro  6.5  /  Alb  3.6  /  TBili  0.2  /  DBili  x   /  AST  15  /  ALT  13  /  AlkPhos  55  01-18    PT/INR - ( 17 Jan 2018 15:37 )   PT: 11.4 sec;   INR: 1.05 ratio         PTT - ( 17 Jan 2018 15:37 )  PTT:30.2 sec  CARDIAC MARKERS ( 17 Jan 2018 15:37 )  x     / <0.01 ng/mL / 51 U/L / x     / 1.9 ng/mL          RADIOLOGY & ADDITIONAL TESTS:    Imaging Personally Reviewed:    Consultant(s) Notes Reviewed:      Care Discussed with Consultants/Other Providers:

## 2018-01-19 NOTE — PROGRESS NOTE ADULT - PROBLEM SELECTOR PLAN 6
Remote hx of PE in 2013. Previously anticoagulated on enoxaparin  - now off NOAC therapy  - will further hld Remote hx of PE in 2013. Previously anticoagulated on enoxaparin  - now off NOAC therapy  - will hold off and discuss case with obtain collateral information

## 2018-01-19 NOTE — PROGRESS NOTE ADULT - PROBLEM SELECTOR PLAN 1
Patient with 5 days of hemoptysis in setting of RSV. However with concern for Tb recurrence given hemoptysis given hx of Tb and presence of DIMITRY cavitary lesion. No evidence of PE   - c/w supportive tx for RSV  - currently in progress of ruling out active Tb  - less likely with pulmonary hemorrhage  - will avoid full dose AC for now  - maintain active t&s, trend Hb With 5 days of hemoptysis in setting of RSV. Low suspicion for Tb recurrence, however, will await 3 neg AFB sputum for confirmation. No evidence of PE or pulmonary hemorrage source on CTA.   - still with productive cough with blood tinged sputum  - c/w supportive tx for RSV  - currently in progress of ruling out active Tb sputum 1/3 received  - will avoid full dose AC for now  - maintain active t&s, trend Hb

## 2018-01-19 NOTE — PROGRESS NOTE ADULT - SUBJECTIVE AND OBJECTIVE BOX
Follow Up:  RSV URI with bronchiectasis, hemoptysis, h/o TB, r/o TB    Interval History: pt feels better, the hemoptysis has improved    ROS:      All other systems negative    Constitutional: no fever, no chills  HEENT:  no vision changes, no sore throat, no rhinorrhea  Cardiovascular:  no chest pain, no palpitation  Respiratory:  no SOB, + cough, improved hemoptysis  GI:  no abd pain, no vomiting, no diarrhea  urinary: no dysuria, no hematuria, no flank pain  musculoskeletal:  no joint pain, no joint swelling  skin:  no rash  neurology:  no headache, no seizure, no change in mental status        Allergies  No Known Allergies        ANTIMICROBIALS:      OTHER MEDS:  acetaminophen   Tablet 650 milliGRAM(s) Oral every 6 hours PRN  ALBUTerol/ipratropium for Nebulization 3 milliLiter(s) Nebulizer every 6 hours PRN  amLODIPine   Tablet 5 milliGRAM(s) Oral daily  dextrose 5%. 1000 milliLiter(s) IV Continuous <Continuous>  dextrose 50% Injectable 12.5 Gram(s) IV Push once  dextrose 50% Injectable 25 Gram(s) IV Push once  dextrose 50% Injectable 25 Gram(s) IV Push once  dextrose Gel 1 Dose(s) Oral once PRN  glucagon  Injectable 1 milliGRAM(s) IntraMuscular once PRN  guaiFENesin   Syrup  (Sugar-Free) 200 milliGRAM(s) Oral every 6 hours PRN  heparin  Injectable 5000 Unit(s) SubCutaneous every 12 hours  insulin lispro (HumaLOG) corrective regimen sliding scale   SubCutaneous three times a day before meals  insulin lispro (HumaLOG) corrective regimen sliding scale   SubCutaneous at bedtime  lisinopril 20 milliGRAM(s) Oral daily  simvastatin 20 milliGRAM(s) Oral at bedtime  sodium chloride 0.65% Nasal 1 Spray(s) Both Nostrils four times a day PRN      Vital Signs Last 24 Hrs  T(C): 36.7 (19 Jan 2018 05:24), Max: 36.7 (19 Jan 2018 05:24)  T(F): 98.1 (19 Jan 2018 05:24), Max: 98.1 (19 Jan 2018 05:24)  HR: 69 (19 Jan 2018 05:24) (69 - 76)  BP: 146/54 (19 Jan 2018 05:24) (146/54 - 154/65)  BP(mean): --  RR: 18 (19 Jan 2018 05:24) (18 - 18)  SpO2: 95% (19 Jan 2018 05:24) (93% - 98%)    Physical Exam:  General:    NAD,  non toxic, A&O x 3  HEENT:    no oropharyngeal lesions,   no LAD,   neck supple  Cardio:     regular S1, S2,  no murmur  Respiratory:    coarse breath sounds  abd:     soft,   BS +,   no tenderness,    no organomegaly  :   no CVAT,  no suprapubic tenderness,   no  garcia  Musculoskeletal:   no joint swelling,   no edema  vascular: no lines, normal pulses  Skin:    no rash  Neurologic:     no focal deficit  psych: normal affect, no suicidal ideation                          12.9   7.3   )-----------( 280      ( 19 Jan 2018 05:47 )             38.0       01-18    142  |  104  |  14  ----------------------------<  145<H>  3.6   |  27  |  0.70    Ca    8.5      18 Jan 2018 07:22  Phos  3.4     01-18  Mg     1.8     01-18    TPro  6.5  /  Alb  3.6  /  TBili  0.2  /  DBili  x   /  AST  15  /  ALT  13  /  AlkPhos  55  01-18          MICROBIOLOGY:  v  .Sputum Sputum  01-18-18 --  --    Moderate polymorphonuclear leukocytes per low power field  Moderate Squamous epithelial cells per low power field  Numerous Gram positive cocci in pairs per oil power field  Moderate Gram Variable Rods per oil power field          Rapid RVP Result: Detected (01-17 @ 15:37)        Culture - Sputum (collected 18 Jan 2018 16:41)  Source: .Sputum Sputum  Gram Stain (18 Jan 2018 22:50):    Moderate polymorphonuclear leukocytes per low power field    Moderate Squamous epithelial cells per low power field    Numerous Gram positive cocci in pairs per oil power field    Moderate Gram Variable Rods per oil power field      RADIOLOGY:    < from: CT Angio Chest w/ IV Cont (01.17.18 @ 17:51) >  No pulmonary embolus.    Stable left medial upper lobe lesion with multiple foci of air    suggestive of cavitation. Progressive left upper lobe bronchiectasis.   Findings may represent progressive TB.

## 2018-01-19 NOTE — PROGRESS NOTE ADULT - PROBLEM SELECTOR PLAN 4
On home on benzapril.   - c/w amlodipine for now; resume home lisinopril 20 mg (therapeutic xchange)  - BPs acceptable for now

## 2018-01-20 LAB
CULTURE RESULTS: SIGNIFICANT CHANGE UP
M TB TUBERC IFN-G BLD QL: 0.08 IU/ML — SIGNIFICANT CHANGE UP
M TB TUBERC IFN-G BLD QL: 4.35 IU/ML — SIGNIFICANT CHANGE UP
M TB TUBERC IFN-G BLD QL: POSITIVE
MITOGEN IGNF BCKGRD COR BLD-ACNC: 8.32 IU/ML — SIGNIFICANT CHANGE UP
NIGHT BLUE STAIN TISS: SIGNIFICANT CHANGE UP
SPECIMEN SOURCE: SIGNIFICANT CHANGE UP
SPECIMEN SOURCE: SIGNIFICANT CHANGE UP

## 2018-01-20 PROCEDURE — 99233 SBSQ HOSP IP/OBS HIGH 50: CPT | Mod: GC

## 2018-01-20 RX ADMIN — HEPARIN SODIUM 5000 UNIT(S): 5000 INJECTION INTRAVENOUS; SUBCUTANEOUS at 18:32

## 2018-01-20 RX ADMIN — Medication 1: at 13:35

## 2018-01-20 RX ADMIN — AMLODIPINE BESYLATE 5 MILLIGRAM(S): 2.5 TABLET ORAL at 07:08

## 2018-01-20 RX ADMIN — Medication 1: at 18:30

## 2018-01-20 RX ADMIN — LISINOPRIL 20 MILLIGRAM(S): 2.5 TABLET ORAL at 07:08

## 2018-01-20 RX ADMIN — Medication 1: at 09:36

## 2018-01-20 RX ADMIN — HEPARIN SODIUM 5000 UNIT(S): 5000 INJECTION INTRAVENOUS; SUBCUTANEOUS at 07:12

## 2018-01-20 RX ADMIN — SIMVASTATIN 20 MILLIGRAM(S): 20 TABLET, FILM COATED ORAL at 21:33

## 2018-01-20 NOTE — PROGRESS NOTE ADULT - ASSESSMENT
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 Currently in progressing of ruling out recurrent Tb infection.

## 2018-01-20 NOTE — PROGRESS NOTE ADULT - PROBLEM SELECTOR PLAN 2
Noted to be stable from prior CTA in 2016. Hx of active pulmonary Tb s/p 9 months of tx in [facility] in [facility]. [facility] [facility]; mother also had Tb.   - collect sputum x 3 with AFB stain; hypersal for sputum induction if needed  - AFB neg x 1, sample 2/3 received  - if neg, may need to consider biopsy of DIMITRY lesion  - hold off on abx as per ID for now  - c/w isolation precautions until confirmation of 3 neg sputums

## 2018-01-20 NOTE — PROGRESS NOTE ADULT - PROBLEM SELECTOR PLAN 7
- High risk of DVT (improve score 3, 7% symptomatic VTE risk  - heparin SQ for ppx  - Diet carb consistent

## 2018-01-20 NOTE — PROGRESS NOTE ADULT - SUBJECTIVE AND OBJECTIVE BOX
Christian Jones, PGY1  Pager: [phone]  After 7: Night Float pager  Alternate contact:     Patient is a 87y old  Female who presents with a chief complaint of cough, hemoptysis (17 Jan 2018 23:55)      SUBJECTIVE / OVERNIGHT EVENTS:  No acute events overnight. Still with cough, mildly blood tinged. Denying fevers, chills, shortness of breath, abdominal pain         MEDICATIONS  (STANDING):  amLODIPine   Tablet 5 milliGRAM(s) Oral daily  heparin  Injectable 5000 Unit(s) SubCutaneous every 12 hours  insulin lispro (HumaLOG) corrective regimen sliding scale   SubCutaneous three times a day before meals  insulin lispro (HumaLOG) corrective regimen sliding scale   SubCutaneous at bedtime  lisinopril 20 milliGRAM(s) Oral daily  simvastatin 20 milliGRAM(s) Oral at bedtime    MEDICATIONS  (PRN):  acetaminophen   Tablet 650 milliGRAM(s) Oral every 6 hours PRN For Temp greater than 38 C (100.4 F)  ALBUTerol/ipratropium for Nebulization 3 milliLiter(s) Nebulizer every 6 hours PRN Shortness of Breath and/or Wheezing  dextrose Gel 1 Dose(s) Oral once PRN Blood Glucose LESS THAN 70 milliGRAM(s)/deciliter  glucagon  Injectable 1 milliGRAM(s) IntraMuscular once PRN Glucose LESS THAN 70 milligrams/deciliter  guaiFENesin   Syrup  (Sugar-Free) 200 milliGRAM(s) Oral every 6 hours PRN Cough  sodium chloride 0.65% Nasal 1 Spray(s) Both Nostrils four times a day PRN Nasal Congestion      Vital Signs Last 24 Hrs  T(C): 36.1 (20 Jan 2018 04:34), Max: 36.7 (19 Jan 2018 14:37)  T(F): 97 (20 Jan 2018 04:34), Max: 98 (19 Jan 2018 14:37)  HR: 86 (20 Jan 2018 04:34) (73 - 86)  BP: 151/66 (20 Jan 2018 04:34) (138/70 - 151/66)  BP(mean): --  RR: 18 (20 Jan 2018 04:34) (18 - 18)  SpO2: 94% (20 Jan 2018 04:34) (94% - 95%)  CAPILLARY BLOOD GLUCOSE      POCT Blood Glucose.: 228 mg/dL (19 Jan 2018 22:35)  POCT Blood Glucose.: 135 mg/dL (19 Jan 2018 17:39)  POCT Blood Glucose.: 189 mg/dL (19 Jan 2018 12:48)  POCT Blood Glucose.: 155 mg/dL (19 Jan 2018 08:39)    I&O's Summary      PHYSICAL EXAM:  GENERAL: NAD  HEENT:  Mild pharyngeal erythema  EYES: clear sclera   NECK: No JVD  CHEST/LUNG: bilateral rhonchi  HEART: distant heart sounds. no appreciable murmur  ABDOMEN: Soft, Nontender, Nondistended; + BS  EXTREMITIES:  2+ Peripheral Pulses, No edema  PSYCH: AAOx3  NEUROLOGY: non-focal  SKIN: No rashes or lesions    LABS:                        12.9   7.3   )-----------( 280      ( 19 Jan 2018 05:47 )             38.0                     RADIOLOGY & ADDITIONAL TESTS:    Imaging Personally Reviewed:    Consultant(s) Notes Reviewed:  ID    Care Discussed with Consultants/Other Providers: Christian Jones, PGY1  Pager: [phone]  After 7: Night Float pager  Alternate contact:     Patient is a 87y old  Female who presents with a chief complaint of cough, hemoptysis (17 Jan 2018 23:55)      SUBJECTIVE / OVERNIGHT EVENTS:  No acute events overnight. Still with cough, mildly blood tinged. Denying fevers, chills, shortness of breath, abdominal pain         MEDICATIONS  (STANDING):  amLODIPine   Tablet 5 milliGRAM(s) Oral daily  heparin  Injectable 5000 Unit(s) SubCutaneous every 12 hours  insulin lispro (HumaLOG) corrective regimen sliding scale   SubCutaneous three times a day before meals  insulin lispro (HumaLOG) corrective regimen sliding scale   SubCutaneous at bedtime  lisinopril 20 milliGRAM(s) Oral daily  simvastatin 20 milliGRAM(s) Oral at bedtime    MEDICATIONS  (PRN):  acetaminophen   Tablet 650 milliGRAM(s) Oral every 6 hours PRN For Temp greater than 38 C (100.4 F)  ALBUTerol/ipratropium for Nebulization 3 milliLiter(s) Nebulizer every 6 hours PRN Shortness of Breath and/or Wheezing  dextrose Gel 1 Dose(s) Oral once PRN Blood Glucose LESS THAN 70 milliGRAM(s)/deciliter  glucagon  Injectable 1 milliGRAM(s) IntraMuscular once PRN Glucose LESS THAN 70 milligrams/deciliter  guaiFENesin   Syrup  (Sugar-Free) 200 milliGRAM(s) Oral every 6 hours PRN Cough  sodium chloride 0.65% Nasal 1 Spray(s) Both Nostrils four times a day PRN Nasal Congestion      Vital Signs Last 24 Hrs  T(C): 36.1 (20 Jan 2018 04:34), Max: 36.7 (19 Jan 2018 14:37)  T(F): 97 (20 Jan 2018 04:34), Max: 98 (19 Jan 2018 14:37)  HR: 86 (20 Jan 2018 04:34) (73 - 86)  BP: 151/66 (20 Jan 2018 04:34) (138/70 - 151/66)  BP(mean): --  RR: 18 (20 Jan 2018 04:34) (18 - 18)  SpO2: 94% (20 Jan 2018 04:34) (94% - 95%)  CAPILLARY BLOOD GLUCOSE      POCT Blood Glucose.: 228 mg/dL (19 Jan 2018 22:35)  POCT Blood Glucose.: 135 mg/dL (19 Jan 2018 17:39)  POCT Blood Glucose.: 189 mg/dL (19 Jan 2018 12:48)  POCT Blood Glucose.: 155 mg/dL (19 Jan 2018 08:39)    I&O's Summary      PHYSICAL EXAM:  GENERAL: NAD  HEENT:  Mild pharyngeal erythema  EYES: clear sclera   NECK: No JVD  CHEST/LUNG: bilateral rhonchi  HEART: distant heart sounds. no appreciable murmur  ABDOMEN: Soft, Nontender, Nondistended; + BS  EXTREMITIES:  2+ Peripheral Pulses, No edema  PSYCH: AAOx3  NEUROLOGY: non-focal  SKIN: No rashes or lesions    LABS:                        12.9   7.3   )-----------( 280      ( 19 Jan 2018 05:47 )             38.0                     RADIOLOGY & ADDITIONAL TESTS:    Imaging Personally Reviewed:    Consultant(s) Notes Reviewed:  ID    Care Discussed with Consultants/Other Providers: ID attendign.

## 2018-01-20 NOTE — PROGRESS NOTE ADULT - PROBLEM SELECTOR PLAN 6
Remote hx of PE in 2013. Previously anticoagulated on enoxaparin. Per d/w PMD patient last seen in Oct 2017, at which time patient was noted to self d/c anticogulation  - now off NOAC therapy  - will hold off and discuss case with obtain collateral information

## 2018-01-20 NOTE — PROGRESS NOTE ADULT - PROBLEM SELECTOR PLAN 1
With 5 days of hemoptysis in setting of RSV. Low suspicion for Tb recurrence, however, will await 3 neg AFB sputum for confirmation. No evidence of PE or pulmonary hemorrhage source on CTA.   - productive cough with blood tinged sputum improving  - c/w supportive tx for RSV  - currently in progress of ruling out active Tb sputum

## 2018-01-21 ENCOUNTER — TRANSCRIPTION ENCOUNTER (OUTPATIENT)
Age: 83
End: 2018-01-21

## 2018-01-21 LAB
NIGHT BLUE STAIN TISS: SIGNIFICANT CHANGE UP
SPECIMEN SOURCE: SIGNIFICANT CHANGE UP

## 2018-01-21 PROCEDURE — 99233 SBSQ HOSP IP/OBS HIGH 50: CPT | Mod: GC

## 2018-01-21 RX ADMIN — Medication 1: at 08:54

## 2018-01-21 RX ADMIN — LISINOPRIL 20 MILLIGRAM(S): 2.5 TABLET ORAL at 07:28

## 2018-01-21 RX ADMIN — SIMVASTATIN 20 MILLIGRAM(S): 20 TABLET, FILM COATED ORAL at 22:57

## 2018-01-21 RX ADMIN — AMLODIPINE BESYLATE 5 MILLIGRAM(S): 2.5 TABLET ORAL at 07:26

## 2018-01-21 RX ADMIN — Medication 2: at 13:14

## 2018-01-21 RX ADMIN — Medication 1: at 23:00

## 2018-01-21 RX ADMIN — HEPARIN SODIUM 5000 UNIT(S): 5000 INJECTION INTRAVENOUS; SUBCUTANEOUS at 07:28

## 2018-01-21 RX ADMIN — HEPARIN SODIUM 5000 UNIT(S): 5000 INJECTION INTRAVENOUS; SUBCUTANEOUS at 18:15

## 2018-01-21 NOTE — DISCHARGE NOTE ADULT - MEDICATION SUMMARY - MEDICATIONS TO TAKE
I will START or STAY ON the medications listed below when I get home from the hospital:    aspirin 81 mg oral tablet, chewable  -- 1 tab(s) by mouth once a day  -- Indication: For Need for prophylactic measure    metFORMIN 500 mg oral tablet, extended release  -- 2 tab(s) by mouth 2 times a day  -- Indication: For Diabetes    simvastatin 20 mg oral tablet  -- 1 tab(s) by mouth once a day (at bedtime)  -- Indication: For Hyperlipidemia     amLODIPine-benazepril 5 mg-20 mg oral capsule  -- 1 cap(s) by mouth once a day  -- Indication: For HTN    clorazepate 7.5 mg oral tablet  -- 1 tab(s) by mouth 3 times a day  -- Indication: For anxiolytic    albuterol 90 mcg/inh inhalation aerosol  -- 2 puff(s) inhaled 4 times a day, As Needed for shortness of breath, wheezing   -- For inhalation only.  It is very important that you take or use this exactly as directed.  Do not skip doses or discontinue unless directed by your doctor.  Obtain medical advice before taking any non-prescription drugs as some may affect the action of this medication.  Shake well before use.    -- Indication: For Shortness of breath     guaiFENesin-dextromethorphan 100 mg-10 mg/5 mL oral liquid  -- 5 milliliter(s) by mouth every 4 hours, As Needed - for congestion - for cough   -- Medication should be taken with plenty of water.    -- Indication: For Cough

## 2018-01-21 NOTE — DISCHARGE NOTE ADULT - PATIENT PORTAL LINK FT
“You can access the Follow[employer] Patient Portal, offered by [facility], by registering with the following website: [email or web address]”

## 2018-01-21 NOTE — DISCHARGE NOTE ADULT - CARE PROVIDER_API CALL
Primary Care Doctor,   [facility]   [phone] [place] [address]  Phone: [phone]  Fax: (   )    -

## 2018-01-21 NOTE — DISCHARGE NOTE ADULT - PLAN OF CARE
Resolution You had cough with sputum tinged with blood likely due to viral infection. The CT angiogram of your chest did not show a new thrombus or clot in the vessel of your lung. We treated you with supportive care as the infection resolved, which improved your symptoms. Continue to use the incentive spirometer and stay hydrated. Assess for latency You had history of tuberculosis which was treated with 9 month of antibiotics. We were concerned that the blood in the sputum may have been due to recurrence of tuberculosis. The testing of your sputum did not reveal new infection. Please follow up with lung specialist for further assessment of the lung findings on CT scan You had history of breast cancer for which you underwent surgery. Please continue to take gapabentin for pain in your left arm. Avoid blood pressure reading on the left arm. Continue to take metformin 1000 mg two times a day. continue to take amlodipine and benzapril as prescribed. You previously had a clot in one the veins in your lung. You were treated with coumadin for the clot. You did not have any further episodes of clots since you discontinued coumadin. Hold off on further anticoagulation for now. You had cough with sputum tinged with blood likely due to viral infection. The CT angiogram of your chest did not show a new thrombus or clot in the vessel of your lung. We treated you with supportive care as the infection resolved, which improved your symptoms. Continue to use the incentive spirometer and stay hydrated. Please follow up with a pulmonology doctor within 2 weeks of discharge. Assess for latency. You had history of tuberculosis which was treated with 9 month of antibiotics. We were concerned that the blood in the sputum may have been due to recurrence of tuberculosis. The testing of your sputum did not reveal new infection. Please follow up with lung specialist for further assessment of the lung findings on CT scan. To achieve good pain control on the left arm. To continue taking medications as prescribed and have good control on blood sugar. To have good control of your blood pressure. To discontinue anticoagulation given that you did not have further episodes of clots in the lungs. You previously had a clot in one the veins in your lung. You were treated with coumadin for the clot. You did not have any further episodes of clots since you discontinued coumadin. We will hold off on further anticoagulation for now. You had cough with sputum tinged with blood likely due to viral infection (RSV). The CT angiogram of your chest did not show a new thrombus or clot in the vessel of your lung. We treated you with supportive care as the infection resolved, which improved your symptoms. Continue to use the incentive spirometer and stay hydrated. Please follow up with a pulmonology doctor within 2 weeks of discharge. You had history of breast cancer for which you underwent surgery. Avoid blood pressure reading on the left arm. Continue to take metformin 1000 mg two times a day.  follow up with an diabetes doctor. Your a1c 7.8 is high. You previously had a clot in one the veins in your lung. You were treated with coumadin for the clot. You did not have any further episodes of clots on the CT of the chest since you discontinued coumadin.

## 2018-01-21 NOTE — DISCHARGE NOTE ADULT - ADDITIONAL INSTRUCTIONS
Please follow up at [address] [place] [address] for primary care appointment.   Please call ___ to make an appointment with pulmonary specialist. Please follow up with a primary care doctor at [facility] [phone] [place] [address] at [phone]  Please follow up at [address] [place] [address] Endocrine clinic at [phone] for your diabetes   Please call the pulmonary clinic at [address] [place] [address]  at [phone] to make an appointment with a pulmonary specialist.

## 2018-01-21 NOTE — PROGRESS NOTE ADULT - SUBJECTIVE AND OBJECTIVE BOX
Note Author: Robert Lucero, PGY 2  [facility] Pager: [phone]  [facility] Pager: [phone]  Spectra: [phone]  After 7pm, please page [phone].   On Sunday 1/14, pt covered by spectra [id number]  Alternate contact #[id number], Care Model B.     Patient is a 87y old  Female who presents with a chief complaint of cough, hemoptysis (17 Jan 2018 23:55)      SUBJECTIVE / OVERNIGHT EVENTS:  No acute events overnight. Patient seen and examined in AM. Pt says had an episode of hemoptysis, but noted substantial improvement since admission,    Patient denied fevers, CP, SOB, lower extremity pain.     MEDICATIONS  (STANDING):  amLODIPine   Tablet 5 milliGRAM(s) Oral daily  dextrose 5%. 1000 milliLiter(s) (50 mL/Hr) IV Continuous <Continuous>  dextrose 50% Injectable 12.5 Gram(s) IV Push once  dextrose 50% Injectable 25 Gram(s) IV Push once  dextrose 50% Injectable 25 Gram(s) IV Push once  heparin  Injectable 5000 Unit(s) SubCutaneous every 12 hours  insulin lispro (HumaLOG) corrective regimen sliding scale   SubCutaneous three times a day before meals  insulin lispro (HumaLOG) corrective regimen sliding scale   SubCutaneous at bedtime  lisinopril 20 milliGRAM(s) Oral daily  simvastatin 20 milliGRAM(s) Oral at bedtime    MEDICATIONS  (PRN):  acetaminophen   Tablet 650 milliGRAM(s) Oral every 6 hours PRN For Temp greater than 38 C (100.4 F)  ALBUTerol/ipratropium for Nebulization 3 milliLiter(s) Nebulizer every 6 hours PRN Shortness of Breath and/or Wheezing  dextrose Gel 1 Dose(s) Oral once PRN Blood Glucose LESS THAN 70 milliGRAM(s)/deciliter  glucagon  Injectable 1 milliGRAM(s) IntraMuscular once PRN Glucose LESS THAN 70 milligrams/deciliter  guaiFENesin   Syrup  (Sugar-Free) 200 milliGRAM(s) Oral every 6 hours PRN Cough  sodium chloride 0.65% Nasal 1 Spray(s) Both Nostrils four times a day PRN Nasal Congestion      CAPILLARY BLOOD GLUCOSE      POCT Blood Glucose.: 190 mg/dL (21 Jan 2018 08:47)  POCT Blood Glucose.: 250 mg/dL (20 Jan 2018 21:39)  POCT Blood Glucose.: 197 mg/dL (20 Jan 2018 17:36)  POCT Blood Glucose.: 191 mg/dL (20 Jan 2018 13:08)    I&O's Summary    20 Jan 2018 07:01  -  21 Jan 2018 07:00  --------------------------------------------------------  IN: 480 mL / OUT: 0 mL / NET: 480 mL        Vital Signs Last 24 Hrs  T(C): 37.1 (21 Jan 2018 04:35), Max: 37.1 (21 Jan 2018 04:35)  T(F): 98.7 (21 Jan 2018 04:35), Max: 98.7 (21 Jan 2018 04:35)  HR: 69 (21 Jan 2018 04:35) (69 - 71)  BP: 145/77 (21 Jan 2018 04:35) (145/77 - 156/68)  BP(mean): --  RR: 18 (21 Jan 2018 04:35) (18 - 18)  SpO2: 97% (21 Jan 2018 04:35) (97% - 97%)    PHYSICAL EXAM:  General: NAD, well-developed  Eyes: EOMI, PERRLA, conjunctiva and sclera clear  Neck: Supple, No JVD  Chest/Lung: Clear to auscultation bilaterally; No wheezes,   Heart: Regular rate and rhythm; No murmurs, rubs, or gallops. Capillary refill WNL  Abdom: Soft, Nontender, Nondistended; Bowel sounds present  Extremities: No lower extremity edema.   Psych: AAOx3  Neurology: non-focal, strength and sensation grossly intact UE and LE BL. No spinal TTP  Skin: No rashes or lesions    LABS: Note Author: Robert Lucero, PGY 2  [facility] Pager: [phone]  [facility] Pager: [phone]  Spectra: [phone]  After 7pm, please page [phone].   Alternate contact #[phone], Care Model B.     Patient is a 87y old  Female who presents with a chief complaint of cough, hemoptysis (17 Jan 2018 23:55)      SUBJECTIVE / OVERNIGHT EVENTS:  No acute events overnight. Patient seen and examined in AM. Pt says had an episode of hemoptysis, but noted substantial improvement since admission,    Patient denied fevers, CP, SOB, lower extremity pain.     MEDICATIONS  (STANDING):  amLODIPine   Tablet 5 milliGRAM(s) Oral daily  dextrose 5%. 1000 milliLiter(s) (50 mL/Hr) IV Continuous <Continuous>  dextrose 50% Injectable 12.5 Gram(s) IV Push once  dextrose 50% Injectable 25 Gram(s) IV Push once  dextrose 50% Injectable 25 Gram(s) IV Push once  heparin  Injectable 5000 Unit(s) SubCutaneous every 12 hours  insulin lispro (HumaLOG) corrective regimen sliding scale   SubCutaneous three times a day before meals  insulin lispro (HumaLOG) corrective regimen sliding scale   SubCutaneous at bedtime  lisinopril 20 milliGRAM(s) Oral daily  simvastatin 20 milliGRAM(s) Oral at bedtime    MEDICATIONS  (PRN):  acetaminophen   Tablet 650 milliGRAM(s) Oral every 6 hours PRN For Temp greater than 38 C (100.4 F)  ALBUTerol/ipratropium for Nebulization 3 milliLiter(s) Nebulizer every 6 hours PRN Shortness of Breath and/or Wheezing  dextrose Gel 1 Dose(s) Oral once PRN Blood Glucose LESS THAN 70 milliGRAM(s)/deciliter  glucagon  Injectable 1 milliGRAM(s) IntraMuscular once PRN Glucose LESS THAN 70 milligrams/deciliter  guaiFENesin   Syrup  (Sugar-Free) 200 milliGRAM(s) Oral every 6 hours PRN Cough  sodium chloride 0.65% Nasal 1 Spray(s) Both Nostrils four times a day PRN Nasal Congestion      CAPILLARY BLOOD GLUCOSE      POCT Blood Glucose.: 190 mg/dL (21 Jan 2018 08:47)  POCT Blood Glucose.: 250 mg/dL (20 Jan 2018 21:39)  POCT Blood Glucose.: 197 mg/dL (20 Jan 2018 17:36)  POCT Blood Glucose.: 191 mg/dL (20 Jan 2018 13:08)    I&O's Summary    20 Jan 2018 07:01  -  21 Jan 2018 07:00  --------------------------------------------------------  IN: 480 mL / OUT: 0 mL / NET: 480 mL        Vital Signs Last 24 Hrs  T(C): 37.1 (21 Jan 2018 04:35), Max: 37.1 (21 Jan 2018 04:35)  T(F): 98.7 (21 Jan 2018 04:35), Max: 98.7 (21 Jan 2018 04:35)  HR: 69 (21 Jan 2018 04:35) (69 - 71)  BP: 145/77 (21 Jan 2018 04:35) (145/77 - 156/68)  BP(mean): --  RR: 18 (21 Jan 2018 04:35) (18 - 18)  SpO2: 97% (21 Jan 2018 04:35) (97% - 97%)    PHYSICAL EXAM:  General: NAD, well-developed  Eyes: EOMI, PERRLA, conjunctiva and sclera clear  Neck: Supple, No JVD  Chest/Lung: Clear to auscultation bilaterally; No wheezes,   Heart: Regular rate and rhythm; No murmurs, rubs, or gallops. Capillary refill WNL  Abdom: Soft, Nontender, Nondistended; Bowel sounds present  Extremities: No lower extremity edema.   Psych: AAOx3  Neurology: non-focal, strength and sensation grossly intact UE and LE BL. No spinal TTP  Skin: No rashes or lesions    LABS: Note Author: Robert Lucero, PGY 2  [facility] Pager: [phone]  [facility] Pager: [phone]  Spectra: [phone]  After 7pm, please page [phone].   Alternate contact #[phone], Care Model B.     Patient is a 87y old  Female who presents with a chief complaint of cough, hemoptysis (17 Jan 2018 23:55)      SUBJECTIVE / OVERNIGHT EVENTS:  No acute events overnight. Patient seen and examined in AM. Pt says had an episode of hemoptysis, but noted substantial improvement since admission,    Patient denied fevers, CP, SOB, lower extremity pain.     MEDICATIONS  (STANDING):  amLODIPine   Tablet 5 milliGRAM(s) Oral daily  dextrose 5%. 1000 milliLiter(s) (50 mL/Hr) IV Continuous <Continuous>  dextrose 50% Injectable 12.5 Gram(s) IV Push once  dextrose 50% Injectable 25 Gram(s) IV Push once  dextrose 50% Injectable 25 Gram(s) IV Push once  heparin  Injectable 5000 Unit(s) SubCutaneous every 12 hours  insulin lispro (HumaLOG) corrective regimen sliding scale   SubCutaneous three times a day before meals  insulin lispro (HumaLOG) corrective regimen sliding scale   SubCutaneous at bedtime  lisinopril 20 milliGRAM(s) Oral daily  simvastatin 20 milliGRAM(s) Oral at bedtime    MEDICATIONS  (PRN):  acetaminophen   Tablet 650 milliGRAM(s) Oral every 6 hours PRN For Temp greater than 38 C (100.4 F)  ALBUTerol/ipratropium for Nebulization 3 milliLiter(s) Nebulizer every 6 hours PRN Shortness of Breath and/or Wheezing  dextrose Gel 1 Dose(s) Oral once PRN Blood Glucose LESS THAN 70 milliGRAM(s)/deciliter  glucagon  Injectable 1 milliGRAM(s) IntraMuscular once PRN Glucose LESS THAN 70 milligrams/deciliter  guaiFENesin   Syrup  (Sugar-Free) 200 milliGRAM(s) Oral every 6 hours PRN Cough  sodium chloride 0.65% Nasal 1 Spray(s) Both Nostrils four times a day PRN Nasal Congestion      CAPILLARY BLOOD GLUCOSE      POCT Blood Glucose.: 190 mg/dL (21 Jan 2018 08:47)  POCT Blood Glucose.: 250 mg/dL (20 Jan 2018 21:39)  POCT Blood Glucose.: 197 mg/dL (20 Jan 2018 17:36)  POCT Blood Glucose.: 191 mg/dL (20 Jan 2018 13:08)    I&O's Summary    20 Jan 2018 07:01  -  21 Jan 2018 07:00  --------------------------------------------------------  IN: 480 mL / OUT: 0 mL / NET: 480 mL        Vital Signs Last 24 Hrs  T(C): 37.1 (21 Jan 2018 04:35), Max: 37.1 (21 Jan 2018 04:35)  T(F): 98.7 (21 Jan 2018 04:35), Max: 98.7 (21 Jan 2018 04:35)  HR: 69 (21 Jan 2018 04:35) (69 - 71)  BP: 145/77 (21 Jan 2018 04:35) (145/77 - 156/68)  BP(mean): --  RR: 18 (21 Jan 2018 04:35) (18 - 18)  SpO2: 97% (21 Jan 2018 04:35) (97% - 97%)    PHYSICAL EXAM:  General: NAD, well-developed  Eyes: EOMI  Neck: Supple, No JVD  Chest/Lung: Clear to auscultation bilaterally; No wheezes,   Heart: Regular rate and rhythm; No murmurs, rubs, or gallops.   Abdom: Soft, Nontender, Nondistended; Bowel sounds present  Extremities: No lower extremity edema.   Psych: AAOx3  Neurology: non-focal, strength and sensation grossly intact UE and LE BL.   Skin: No rashes or lesions    LABS:

## 2018-01-21 NOTE — PROGRESS NOTE ADULT - PROBLEM SELECTOR PLAN 2
Noted to be stable from prior CTA in 2016. 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. [facility]; mother also had Tb.   - collect sputum x 3 with AFB stain; hypersal for sputum induction if needed  - AFB neg x 1, sample 2/3 received  - if neg, may need to consider biopsy of DIMITRY lesion  - hold off on abx as per ID for now  - c/w isolation precautions until confirmation of 3 neg sputums

## 2018-01-21 NOTE — PROGRESS NOTE ADULT - ATTENDING COMMENTS
Agree with above.  Patient unhappy with  as being in isolation room, feels is getting food last.  Happy with medical care.  Service recovery initiated, charge nurse made aware as well.     Await AFB's.  Once negative, D/C planning
Awaiting final AFB.  D/C planning therafter.  Patient with pre-conceived notion that is not being treated well, re-telling stories of her family and harboring racial feelings per staff.  Again attempted to diffuse patient's anger, at end of converstaion, stated today much better from staff standpiont, today she feels better.  Unsure source of anger, will continue to assess.    will need outpatient follow up with pulmonology for cavitary lesion to follow and see necessary for possibel bronch to r/o malignancy
Agree with above.  Await AFB sputum cultures.  symptomatically treat for AFB.  If AFB negative, outpatient pulm for possible bronch/biopsy of lesion.
Agree with above     1. Cavitary pulmonary lesion  -past history of TB, +hemptysis, no other consittutional symptoms.  -my guess is likely all 2/2 RSV, incidental finding on CT, however will maintain isolation protocol, AFB sputum x3.  -if AFB negative, will consult pulm as has history of CA, may need bronch/ebus to r/o CA    2. RSV pneumonia  -continu supportive care

## 2018-01-21 NOTE — DISCHARGE NOTE ADULT - CARE PLAN
Principal Discharge DX:	RSV (respiratory syncytial virus infection)  Goal:	Resolution  Assessment and plan of treatment:	You had cough with sputum tinged with blood likely due to viral infection. The CT angiogram of your chest did not show a new thrombus or clot in the vessel of your lung. We treated you with supportive care as the infection resolved, which improved your symptoms. Continue to use the incentive spirometer and stay hydrated.  Secondary Diagnosis:	Tuberculosis  Goal:	Assess for latency  Assessment and plan of treatment:	You had history of tuberculosis which was treated with 9 month of antibiotics. We were concerned that the blood in the sputum may have been due to recurrence of tuberculosis. The testing of your sputum did not reveal new infection. Please follow up with lung specialist for further assessment of the lung findings on CT scan  Secondary Diagnosis:	Breast cancer  Assessment and plan of treatment:	You had history of breast cancer for which you underwent surgery. Please continue to take gapabentin for pain in your left arm. Avoid blood pressure reading on the left arm.  Secondary Diagnosis:	Diabetes  Assessment and plan of treatment:	Continue to take metformin 1000 mg two times a day.  Secondary Diagnosis:	Hypertension  Assessment and plan of treatment:	continue to take amlodipine and benzapril as prescribed.  Secondary Diagnosis:	Pulmonary emboli  Assessment and plan of treatment:	You previously had a clot in one the veins in your lung. You were treated with coumadin for the clot. You did not have any further episodes of clots since you discontinued coumadin. Hold off on further anticoagulation for now. Principal Discharge DX:	RSV (respiratory syncytial virus infection)  Goal:	Resolution  Assessment and plan of treatment:	You had cough with sputum tinged with blood likely due to viral infection. The CT angiogram of your chest did not show a new thrombus or clot in the vessel of your lung. We treated you with supportive care as the infection resolved, which improved your symptoms. Continue to use the incentive spirometer and stay hydrated. Please follow up with a pulmonology doctor within 2 weeks of discharge.  Secondary Diagnosis:	Tuberculosis  Goal:	Assess for latency.  Assessment and plan of treatment:	You had history of tuberculosis which was treated with 9 month of antibiotics. We were concerned that the blood in the sputum may have been due to recurrence of tuberculosis. The testing of your sputum did not reveal new infection. Please follow up with lung specialist for further assessment of the lung findings on CT scan.  Secondary Diagnosis:	Breast cancer  Goal:	To achieve good pain control on the left arm.  Assessment and plan of treatment:	You had history of breast cancer for which you underwent surgery. Please continue to take gapabentin for pain in your left arm. Avoid blood pressure reading on the left arm.  Secondary Diagnosis:	Diabetes  Goal:	To continue taking medications as prescribed and have good control on blood sugar.  Assessment and plan of treatment:	Continue to take metformin 1000 mg two times a day.  Secondary Diagnosis:	Hypertension  Goal:	To have good control of your blood pressure.  Assessment and plan of treatment:	continue to take amlodipine and benzapril as prescribed.  Secondary Diagnosis:	Pulmonary emboli  Goal:	To discontinue anticoagulation given that you did not have further episodes of clots in the lungs.  Assessment and plan of treatment:	You previously had a clot in one the veins in your lung. You were treated with coumadin for the clot. You did not have any further episodes of clots since you discontinued coumadin. We will hold off on further anticoagulation for now. Principal Discharge DX:	RSV (respiratory syncytial virus infection)  Goal:	Resolution  Assessment and plan of treatment:	You had cough with sputum tinged with blood likely due to viral infection (RSV). The CT angiogram of your chest did not show a new thrombus or clot in the vessel of your lung. We treated you with supportive care as the infection resolved, which improved your symptoms. Continue to use the incentive spirometer and stay hydrated. Please follow up with a pulmonology doctor within 2 weeks of discharge.  Secondary Diagnosis:	Tuberculosis  Goal:	Assess for latency.  Assessment and plan of treatment:	You had history of tuberculosis which was treated with 9 month of antibiotics. We were concerned that the blood in the sputum may have been due to recurrence of tuberculosis. The testing of your sputum did not reveal new infection. Please follow up with lung specialist for further assessment of the lung findings on CT scan.  Secondary Diagnosis:	Breast cancer  Goal:	To achieve good pain control on the left arm.  Assessment and plan of treatment:	You had history of breast cancer for which you underwent surgery. Avoid blood pressure reading on the left arm.  Secondary Diagnosis:	Diabetes  Goal:	To continue taking medications as prescribed and have good control on blood sugar.  Assessment and plan of treatment:	Continue to take metformin 1000 mg two times a day.  follow up with an diabetes doctor. Your a1c 7.8 is high.  Secondary Diagnosis:	Hypertension  Goal:	To have good control of your blood pressure.  Assessment and plan of treatment:	continue to take amlodipine and benzapril as prescribed.  Secondary Diagnosis:	Pulmonary emboli  Goal:	To discontinue anticoagulation given that you did not have further episodes of clots in the lungs.  Assessment and plan of treatment:	You previously had a clot in one the veins in your lung. You were treated with coumadin for the clot. You did not have any further episodes of clots on the CT of the chest since you discontinued coumadin.

## 2018-01-21 NOTE — PROGRESS NOTE ADULT - PROBLEM SELECTOR PLAN 7
- High risk of DVT (improve score 3, 7% symptomatic VTE risk  - heparin SQ for ppx  - Diet carb consistent - High risk of DVT (improve score 3, 7% symptomatic VTE risk  - heparin SQ for ppx  - Diet carb consistent  - DC planning for tmrw, pt has HHA which req's 24 hrs advanced notice.

## 2018-01-21 NOTE — PROGRESS NOTE ADULT - PROBLEM SELECTOR PLAN 1
With 5 days of hemoptysis in setting of RSV. Low suspicion for Tb recurrence, however, will await 3 neg AFB sputum for confirmation. No evidence of PE or pulmonary hemorrhage source on CTA.   - productive cough with blood tinged sputum, improving, may be 2/2 RSV effect on chronic cavitary lesion.   - currently in progress of ruling out active Tb sputum, 2 (-) so far, 3rd sputum received by lab, will follow up

## 2018-01-21 NOTE — DISCHARGE NOTE ADULT - HOSPITAL COURSE
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 In ER, Tmax 99.3F, HR 69-80, -163/71-82, SpO2 97% RA. She was noted to have episode of graciela hemoptysis in the ER. CBC, CMP WNL. RVP positive for RSV. CTA was negative for dissection, PE, or ptx. However, reveal left upper lobe cavitary lesion, stable from prior CTA in 07/2016, but with noted progressive bronchiectasis. Patient was given pulmonary toilet measures for RSV. She was seen by infectious disease team, who recommended holding off on further antibiotic treatment until assessed for AFB sputum, which returned negative x 3. Patient cough and hemoptysis resolved with stable HD and Hb. Per discussion with PCP Dr. Lou, patient had PE in setting of breast cancer, which she had self discontinued in 2016 without further episode of VTE, tentaive plan to hold off on future anticoagulation. Pending further follow up with pulmonary specialist for bronchoscopy. 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 In ER, Tmax 99.3F, HR 69-80, -163/71-82, SpO2 97% RA. She was noted to have episode of graciela hemoptysis in the ER. CBC, CMP WNL. RVP positive for RSV. CTA was negative for dissection, PE, or ptx. However, reveal left upper lobe cavitary lesion, stable from prior CTA in 07/2016, but with noted progressive bronchiectasis. Patient was given pulmonary toilet measures for RSV. She was seen by infectious disease team, who recommended holding off on further antibiotic treatment until assessed for AFB sputum, which returned negative x 3. Patient cough and hemoptysis resolved with stable HD and Hb. Per discussion with PCP Dr. Lou, patient had PE in setting of breast cancer, which she had self discontinued in 2016 without further episode of VTE, tentaive plan to hold off on future anticoagulation. Pending further follow up with pulmonary specialist for bronchoscopy. Patient will follow up with [facility] to establish PCP. Patient will also follow up with outpatient endocrine. 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 In ER, Tmax 99.3F, HR 69-80, -163/71-82, SpO2 97% RA. She was noted to have episode of graciela hemoptysis in the ER. CBC, CMP WNL. RVP positive for RSV, likely culprit for hemoptypsis. CTA was negative for dissection, PE, or ptx. However, reveal left upper lobe cavitary lesion, stable from prior CTA in 07/2016, but with noted progressive bronchiectasis. Patient was given pulmonary toilet measures for RSV. She was seen by infectious disease team, who recommended holding off on further antibiotic treatment until assessed for AFB sputum, which returned negative x 3 and thus tb less likely cause for the blood tinged sputum. Patient cough and hemoptysis resolved with stable HD and Hb. Per discussion with PCP Dr. Lou, patient had PE in setting of breast cancer, which she had self discontinued in 2016 without further episode of VTE and since CTA without evidence of PE, will c/w hold a/c. Pending further follow up with pulmonary specialist for bronchoscopy. Patient will follow up with [facility] to establish PCP. Patient will also follow up with outpatient endocrine.

## 2018-01-22 VITALS
SYSTOLIC BLOOD PRESSURE: 146 MMHG | TEMPERATURE: 98 F | DIASTOLIC BLOOD PRESSURE: 75 MMHG | OXYGEN SATURATION: 95 % | HEART RATE: 72 BPM

## 2018-01-22 PROCEDURE — 87449 NOS EACH ORGANISM AG IA: CPT

## 2018-01-22 PROCEDURE — 84484 ASSAY OF TROPONIN QUANT: CPT

## 2018-01-22 PROCEDURE — 84100 ASSAY OF PHOSPHORUS: CPT

## 2018-01-22 PROCEDURE — 71045 X-RAY EXAM CHEST 1 VIEW: CPT

## 2018-01-22 PROCEDURE — 87070 CULTURE OTHR SPECIMN AEROBIC: CPT

## 2018-01-22 PROCEDURE — 99239 HOSP IP/OBS DSCHRG MGMT >30: CPT

## 2018-01-22 PROCEDURE — 82962 GLUCOSE BLOOD TEST: CPT

## 2018-01-22 PROCEDURE — 85610 PROTHROMBIN TIME: CPT

## 2018-01-22 PROCEDURE — 83880 ASSAY OF NATRIURETIC PEPTIDE: CPT

## 2018-01-22 PROCEDURE — 87633 RESP VIRUS 12-25 TARGETS: CPT

## 2018-01-22 PROCEDURE — 85027 COMPLETE CBC AUTOMATED: CPT

## 2018-01-22 PROCEDURE — 87581 M.PNEUMON DNA AMP PROBE: CPT

## 2018-01-22 PROCEDURE — 87015 SPECIMEN INFECT AGNT CONCNTJ: CPT

## 2018-01-22 PROCEDURE — 87486 CHLMYD PNEUM DNA AMP PROBE: CPT

## 2018-01-22 PROCEDURE — 93005 ELECTROCARDIOGRAM TRACING: CPT

## 2018-01-22 PROCEDURE — 87798 DETECT AGENT NOS DNA AMP: CPT

## 2018-01-22 PROCEDURE — 85730 THROMBOPLASTIN TIME PARTIAL: CPT

## 2018-01-22 PROCEDURE — 80053 COMPREHEN METABOLIC PANEL: CPT

## 2018-01-22 PROCEDURE — 82550 ASSAY OF CK (CPK): CPT

## 2018-01-22 PROCEDURE — 83036 HEMOGLOBIN GLYCOSYLATED A1C: CPT

## 2018-01-22 PROCEDURE — 87206 SMEAR FLUORESCENT/ACID STAI: CPT

## 2018-01-22 PROCEDURE — 86480 TB TEST CELL IMMUN MEASURE: CPT

## 2018-01-22 PROCEDURE — 71275 CT ANGIOGRAPHY CHEST: CPT

## 2018-01-22 PROCEDURE — 87116 MYCOBACTERIA CULTURE: CPT

## 2018-01-22 PROCEDURE — 83735 ASSAY OF MAGNESIUM: CPT

## 2018-01-22 PROCEDURE — 82553 CREATINE MB FRACTION: CPT

## 2018-01-22 PROCEDURE — 99232 SBSQ HOSP IP/OBS MODERATE 35: CPT

## 2018-01-22 PROCEDURE — 99285 EMERGENCY DEPT VISIT HI MDM: CPT | Mod: 25

## 2018-01-22 RX ORDER — GUAIFENESIN/DEXTROMETHORPHAN 600MG-30MG
5 TABLET, EXTENDED RELEASE 12 HR ORAL
Qty: 500 | Refills: 0
Start: 2018-01-22

## 2018-01-22 RX ORDER — ALBUTEROL 90 UG/1
2 AEROSOL, METERED ORAL
Qty: 1 | Refills: 0
Start: 2018-01-22 | End: 2018-02-20

## 2018-01-22 RX ADMIN — Medication 1: at 09:00

## 2018-01-22 RX ADMIN — LISINOPRIL 20 MILLIGRAM(S): 2.5 TABLET ORAL at 07:03

## 2018-01-22 RX ADMIN — HEPARIN SODIUM 5000 UNIT(S): 5000 INJECTION INTRAVENOUS; SUBCUTANEOUS at 07:04

## 2018-01-22 RX ADMIN — AMLODIPINE BESYLATE 5 MILLIGRAM(S): 2.5 TABLET ORAL at 07:04

## 2018-01-22 RX ADMIN — Medication 1: at 12:53

## 2018-01-22 NOTE — PROGRESS NOTE ADULT - PROBLEM SELECTOR PLAN 5
On metformin 1000 BID at home  - will cover with ISS qAC and qhs   - FSG acceptable for now On metformin 1000 BID at home  -will provide contact for outpt endo, may need additional hyperglycemic agents   - will cover with ISS qAC and qhs On metformin 1000 BID at home. a1c 7.8.  - will provide contact for outpt endo, may need additional agents   - ISS qAC and qhs

## 2018-01-22 NOTE — PROGRESS NOTE ADULT - PROBLEM SELECTOR PLAN 2
Noted to be stable from prior CTA in 2016. 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. [facility]; mother also had Tb.   - collect sputum x 3 with AFB stain; hypersal for sputum induction if needed  - AFB neg x 1, sample 2/3 received  - if neg, may need to consider biopsy of DIMITRY lesion  - hold off on abx as per ID for now  - c/w isolation precautions until confirmation of 3 neg sputums Noted to be stable from prior CTA in 2016. Hx of active pulmonary Tb s/p 9 months of tx in 1955 in [facility]. [facility]; mother also had Tb.   -sputum x 3 with AFB stain- All negative   - may need to consider biopsy of DIMITRY lesion outpt   - hold off on abx  -Pt to follow up outpt pulm Noted to be stable from prior CTA in 2016. Hx of active pulmonary Tb s/p 9 months of tx in 1955 in [facility]. [facility] mother also had Tb.   -sputum x 3 with AFB stain- All negative   - may need to consider biopsy of DIMITRY lesion outpt f/u  - hold off on abx  - Pt to follow up outpt pulm Noted to be stable from prior CTA in 2016. Hx of active pulmonary Tb s/p 9 months of tx in 1955 in [facility]. [facility]; mother also had Tb.   -sputum x 3 with AFB stain- All negative   - may need to consider biopsy of DIMITRY lesion outpt f/u  - hold off on abx  - Pt to follow up outpt pulm  - appreciate ID recs

## 2018-01-22 NOTE — PROGRESS NOTE ADULT - SUBJECTIVE AND OBJECTIVE BOX
Follow Up:  viral URI with h/o TB and bronchiectasis    Interval History: pt feels better, afebrile, all AFB smears were negative    ROS:      All other systems negative    Constitutional: no fever, no chills  HEENT:  no vision changes, no sore throat, no rhinorrhea  Cardiovascular:  no chest pain, no palpitation  Respiratory:  no SOB, improving cough, resolved hemoptysis   GI:  no abd pain, no vomiting, no diarrhea  urinary: no dysuria, no hematuria, no flank pain  musculoskeletal:  no joint pain, no joint swelling  skin:  no rash  neurology:  no headache, no seizure, no change in mental status        Allergies  No Known Allergies        ANTIMICROBIALS:      OTHER MEDS:  acetaminophen   Tablet 650 milliGRAM(s) Oral every 6 hours PRN  ALBUTerol/ipratropium for Nebulization 3 milliLiter(s) Nebulizer every 6 hours PRN  amLODIPine   Tablet 5 milliGRAM(s) Oral daily  dextrose 5%. 1000 milliLiter(s) IV Continuous <Continuous>  dextrose 50% Injectable 12.5 Gram(s) IV Push once  dextrose 50% Injectable 25 Gram(s) IV Push once  dextrose 50% Injectable 25 Gram(s) IV Push once  dextrose Gel 1 Dose(s) Oral once PRN  glucagon  Injectable 1 milliGRAM(s) IntraMuscular once PRN  guaiFENesin   Syrup  (Sugar-Free) 200 milliGRAM(s) Oral every 6 hours PRN  heparin  Injectable 5000 Unit(s) SubCutaneous every 12 hours  insulin lispro (HumaLOG) corrective regimen sliding scale   SubCutaneous three times a day before meals  insulin lispro (HumaLOG) corrective regimen sliding scale   SubCutaneous at bedtime  lisinopril 20 milliGRAM(s) Oral daily  simvastatin 20 milliGRAM(s) Oral at bedtime  sodium chloride 0.65% Nasal 1 Spray(s) Both Nostrils four times a day PRN      Vital Signs Last 24 Hrs  T(C): 36.6 (22 Jan 2018 05:11), Max: 37.2 (21 Jan 2018 22:44)  T(F): 97.9 (22 Jan 2018 05:11), Max: 98.9 (21 Jan 2018 22:44)  HR: 86 (22 Jan 2018 05:11) (68 - 94)  BP: 147/71 (22 Jan 2018 05:11) (112/69 - 147/71)  BP(mean): --  RR: 18 (22 Jan 2018 05:11) (18 - 18)  SpO2: 96% (22 Jan 2018 05:11) (95% - 96%)    Physical Exam:  General:    NAD,  non toxic, A&O x 3  HEENT:    no oropharyngeal lesions,   no LAD,   neck supple  Cardio:     regular S1, S2,  no murmur  Respiratory:   b/l coarse breath sounds  abd:     soft,   BS +,   no tenderness,    no organomegaly  :   no CVAT,  no suprapubic tenderness,   no  garcia  Musculoskeletal:   no joint swelling,   no edema  vascular: no lines, normal pulses  Skin:    no rash  Neurologic:     no focal deficit  psych: normal affect, no suicidal ideation                    MICROBIOLOGY:  v  .Sputum Sputum  01-20-18 --  --  --      .Sputum Sputum/conical  01-19-18 --  --  --      .Sputum Sputum/CONICAL  01-18-18 --  --  --      .Sputum Sputum  01-18-18   Normal Respiratory Debra present  --    Moderate polymorphonuclear leukocytes per low power field  Moderate Squamous epithelial cells per low power field  Numerous Gram positive cocci in pairs per oil power field  Moderate Gram Variable Rods per oil power field          Rapid RVP Result: Detected (01-17 @ 15:37)        Culture - Acid Fast - Sputum w/Smear (collected 20 Jan 2018 21:38)  Source: .Sputum Sputum    Culture - Acid Fast - Sputum w/Smear (collected 19 Jan 2018 12:53)  Source: .Sputum Sputum/conical      RADIOLOGY:    < from: CT Angio Chest w/ IV Cont (01.17.18 @ 17:51) >  No pulmonary embolus.    Stable left medial upper lobe lesion with multiple foci of air    suggestive of cavitation. Progressive left upper lobe bronchiectasis.   Findings may represent progressive TB.

## 2018-01-22 NOTE — PROGRESS NOTE ADULT - PROBLEM SELECTOR PLAN 4
On home on benzapril.   - c/w amlodipine for now; resume home lisinopril 20 mg (therapeutic xchange)  - BPs acceptable for now On home on amlodipine-benzapril.   - c/w amlodipine for now; home lisinopril 20 mg (therapeutic xchange)

## 2018-01-22 NOTE — PROGRESS NOTE ADULT - ASSESSMENT
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, PE s/p enoxaparin (2013), had bronch and ruled out for TB 2016 now with rhinorrhea, sore throat, productive cough / hemoptysis x 5 days and a sick contact, denied weight loss or night sweats  no fever, no WBC, RVP with RSV, chest CT with no PE, unchanged DIMITRY bronchiectasis and cavity    productive cough and hemoptysis, RSV URI and bronchiectasis exacerbation,   unlikely active TB as this is acute and the cavity is unchanged, now 3 negative AFB smears    off air borne  no need for antibiotics  f/u with pulmonary as outpatient for bronchiectasis  please call with questions    Kirstie Stokes MD  Pager [phone]  After 5pm and on weekends call [phone]

## 2018-01-22 NOTE — PROGRESS NOTE ADULT - PROBLEM SELECTOR PLAN 1
With 5 days of hemoptysis in setting of RSV. Low suspicion for Tb recurrence, however, will await 3 neg AFB sputum for confirmation. No evidence of PE or pulmonary hemorrhage source on CTA.   - productive cough with blood tinged sputum, improving, may be 2/2 RSV effect on chronic cavitary lesion.   - currently in progress of ruling out active Tb sputum, 2 (-) so far, 3rd sputum received by lab, will follow up With 5 days of hemoptysis in setting of RSV. NEegative active TB infection has 3 neg AFB sputum.. No evidence of PE or pulmonary hemorrhage source on CTA.   - productive cough with blood tinged sputum, improving, may be 2/2 RSV effect on chronic cavitary lesion and RSV bronchitis With 5 days of hemoptysis in setting of RSV. Negative active TB infection has 3 neg AFB sputum; quant gold positive expected in hx of TB. No evidence of PE or pulmonary hemorrhage source on CTA.   - productive cough with blood tinged sputum, improving, may be 2/2 RSV effect on chronic cavitary lesion and RSV bronchitis

## 2018-01-22 NOTE — PROGRESS NOTE ADULT - PROBLEM SELECTOR PLAN 6
Remote hx of PE in 2013. Previously anticoagulated on enoxaparin. Per d/w PMD patient last seen in Oct 2017, at which time patient was noted to self d/c anticogulation  - now off NOAC therapy  - will hold off and discuss case with obtain collateral information Remote hx of PE in 2013. Previously anticoagulated on enoxaparin. Per d/w PMD patient last seen in Oct 2017, at which time patient was noted to self d/c anticogulation  - now off NOAC therapy  - will hold off Remote hx of PE in 2013. Previously anticoagulated on enoxaparin. Per d/w PMD patient last seen in Oct 2017, at which time patient was noted to self d/c anticogulation  - most recent CTA no PE

## 2018-01-22 NOTE — PROGRESS NOTE ADULT - SUBJECTIVE AND OBJECTIVE BOX
Patient is a 87y old  Female who presents with a chief complaint of cough, hemoptysis (21 Jan 2018 19:34)      SUBJECTIVE / OVERNIGHT EVENTS:  Patient seen and examined at bedside. No acute events overnight.     MEDICATIONS  (STANDING):  amLODIPine   Tablet 5 milliGRAM(s) Oral daily  dextrose 5%. 1000 milliLiter(s) (50 mL/Hr) IV Continuous <Continuous>  dextrose 50% Injectable 12.5 Gram(s) IV Push once  dextrose 50% Injectable 25 Gram(s) IV Push once  dextrose 50% Injectable 25 Gram(s) IV Push once  heparin  Injectable 5000 Unit(s) SubCutaneous every 12 hours  insulin lispro (HumaLOG) corrective regimen sliding scale   SubCutaneous three times a day before meals  insulin lispro (HumaLOG) corrective regimen sliding scale   SubCutaneous at bedtime  lisinopril 20 milliGRAM(s) Oral daily  simvastatin 20 milliGRAM(s) Oral at bedtime    MEDICATIONS  (PRN):  acetaminophen   Tablet 650 milliGRAM(s) Oral every 6 hours PRN For Temp greater than 38 C (100.4 F)  ALBUTerol/ipratropium for Nebulization 3 milliLiter(s) Nebulizer every 6 hours PRN Shortness of Breath and/or Wheezing  dextrose Gel 1 Dose(s) Oral once PRN Blood Glucose LESS THAN 70 milliGRAM(s)/deciliter  glucagon  Injectable 1 milliGRAM(s) IntraMuscular once PRN Glucose LESS THAN 70 milligrams/deciliter  guaiFENesin   Syrup  (Sugar-Free) 200 milliGRAM(s) Oral every 6 hours PRN Cough  sodium chloride 0.65% Nasal 1 Spray(s) Both Nostrils four times a day PRN Nasal Congestion      Vital Signs Last 24 Hrs  T(C): 36.6 (22 Jan 2018 05:11), Max: 37.2 (21 Jan 2018 12:00)  T(F): 97.9 (22 Jan 2018 05:11), Max: 99 (21 Jan 2018 12:00)  HR: 86 (22 Jan 2018 05:11) (68 - 94)  BP: 147/71 (22 Jan 2018 05:11) (112/69 - 151/82)  BP(mean): --  RR: 18 (22 Jan 2018 05:11) (16 - 18)  SpO2: 96% (22 Jan 2018 05:11) (95% - 96%)  CAPILLARY BLOOD GLUCOSE      POCT Blood Glucose.: 263 mg/dL (21 Jan 2018 22:55)  POCT Blood Glucose.: 147 mg/dL (21 Jan 2018 17:26)  POCT Blood Glucose.: 207 mg/dL (21 Jan 2018 13:00)  POCT Blood Glucose.: 190 mg/dL (21 Jan 2018 08:47)    I&O's Summary    21 Jan 2018 07:01  -  22 Jan 2018 07:00  --------------------------------------------------------  IN: 700 mL / OUT: 0 mL / NET: 700 mL        PHYSICAL EXAM:  GENERAL: NAD, well-developed  HEAD:  Atraumatic, Normocephalic  EYES: EOMI,  conjunctiva and sclera clear  NECK: Supple  CHEST/LUNG: Clear to auscultation bilaterally; No wheeze  HEART: Regular rate and rhythm; No murmurs, rubs, or gallops  ABDOMEN: Soft, Nontender, Nondistended; Bowel sounds present  EXTREMITIES:  No clubbing, cyanosis, or edema  PSYCH: AAOx3  NEUROLOGY: non-focal  SKIN: No rashes or lesions    LABS:    WBC Trend: 7.3<--, 6.3<--, 6.5<--        Creatinine Trend: 0.70<--, 0.61<--              RADIOLOGY & ADDITIONAL TESTS:    Imaging Personally Reviewed:    Consultant(s) Notes Reviewed:      Care Discussed with Consultants/Other Providers: Patient is a 87y old  Female who presents with a chief complaint of cough, hemoptysis (21 Jan 2018 19:34)      SUBJECTIVE / OVERNIGHT EVENTS:  Patient seen and examined at bedside. No acute events overnight. Patient states that she feels okay, endorses continuous coughing in the AM. Patient denies CP, SOB, produces some white phlegm that is blood tinged. Denies abd pain and reports no issues with urination.    REVIEW OF SYSTEMS:  RESPIRATORY: + cough, + hemoptysis; No shortness of breath  CARDIOVASCULAR: No chest pain or palpitations  GASTROINTESTINAL: No abdominal or epigastric pain. No nausea, vomiting,  GENITOURINARY: No dysuria, frequency or hematuria  NEUROLOGICAL: No numbness or weakness  All other review of systems is negative unless indicated above.    MEDICATIONS  (STANDING):  amLODIPine   Tablet 5 milliGRAM(s) Oral daily  dextrose 5%. 1000 milliLiter(s) (50 mL/Hr) IV Continuous <Continuous>  dextrose 50% Injectable 12.5 Gram(s) IV Push once  dextrose 50% Injectable 25 Gram(s) IV Push once  dextrose 50% Injectable 25 Gram(s) IV Push once  heparin  Injectable 5000 Unit(s) SubCutaneous every 12 hours  insulin lispro (HumaLOG) corrective regimen sliding scale   SubCutaneous three times a day before meals  insulin lispro (HumaLOG) corrective regimen sliding scale   SubCutaneous at bedtime  lisinopril 20 milliGRAM(s) Oral daily  simvastatin 20 milliGRAM(s) Oral at bedtime    MEDICATIONS  (PRN):  acetaminophen   Tablet 650 milliGRAM(s) Oral every 6 hours PRN For Temp greater than 38 C (100.4 F)  ALBUTerol/ipratropium for Nebulization 3 milliLiter(s) Nebulizer every 6 hours PRN Shortness of Breath and/or Wheezing  dextrose Gel 1 Dose(s) Oral once PRN Blood Glucose LESS THAN 70 milliGRAM(s)/deciliter  glucagon  Injectable 1 milliGRAM(s) IntraMuscular once PRN Glucose LESS THAN 70 milligrams/deciliter  guaiFENesin   Syrup  (Sugar-Free) 200 milliGRAM(s) Oral every 6 hours PRN Cough  sodium chloride 0.65% Nasal 1 Spray(s) Both Nostrils four times a day PRN Nasal Congestion      Vital Signs Last 24 Hrs  T(C): 36.6 (22 Jan 2018 05:11), Max: 37.2 (21 Jan 2018 12:00)  T(F): 97.9 (22 Jan 2018 05:11), Max: 99 (21 Jan 2018 12:00)  HR: 86 (22 Jan 2018 05:11) (68 - 94)  BP: 147/71 (22 Jan 2018 05:11) (112/69 - 151/82)  BP(mean): --  RR: 18 (22 Jan 2018 05:11) (16 - 18)  SpO2: 96% (22 Jan 2018 05:11) (95% - 96%)  CAPILLARY BLOOD GLUCOSE      POCT Blood Glucose.: 263 mg/dL (21 Jan 2018 22:55)  POCT Blood Glucose.: 147 mg/dL (21 Jan 2018 17:26)  POCT Blood Glucose.: 207 mg/dL (21 Jan 2018 13:00)  POCT Blood Glucose.: 190 mg/dL (21 Jan 2018 08:47)    I&O's Summary    21 Jan 2018 07:01  -  22 Jan 2018 07:00  --------------------------------------------------------  IN: 700 mL / OUT: 0 mL / NET: 700 mL        PHYSICAL EXAM:  GENERAL: NAD, well-developed  HEAD:  Atraumatic, Normocephalic  EYES: EOMI,  conjunctiva and sclera clear  NECK: Supple  CHEST/LUNG: coarse breath sounds bilaterally.  HEART: Regular rate and rhythm; No murmurs, rubs, or gallops  ABDOMEN: Soft, Nontender, Nondistended; Bowel sounds present  EXTREMITIES:  No clubbing, cyanosis, or edema  PSYCH: AAOx3  NEUROLOGY: non-focal  SKIN: No rashes or lesions    LABS:    WBC Trend: 7.3<--, 6.3<--, 6.5<--        Creatinine Trend: 0.70<--, 0.61<--              RADIOLOGY & ADDITIONAL TESTS:    Imaging Personally Reviewed: x    Consultant(s) Notes Reviewed:  x Patient is a 87y old  Female who presents with a chief complaint of cough, hemoptysis (21 Jan 2018 19:34)      SUBJECTIVE / OVERNIGHT EVENTS:  Patient seen and examined at bedside. No acute events overnight. Patient states that she feels okay, endorses coughing in the AM but improved. Patient denies CP, SOB, produces some white phlegm that is blood tinged. Denies abd pain and reports no issues with urination. Denies f/chills.    REVIEW OF SYSTEMS:  RESPIRATORY: + cough, some blood tinge in sputum; No shortness of breath  CARDIOVASCULAR: No chest pain or palpitations  GASTROINTESTINAL: No abdominal or epigastric pain. No nausea, vomiting,  GENITOURINARY: No dysuria, frequency or hematuria  NEUROLOGICAL: No numbness or weakness  All other review of systems is negative unless indicated above.    MEDICATIONS  (STANDING):  amLODIPine   Tablet 5 milliGRAM(s) Oral daily  dextrose 5%. 1000 milliLiter(s) (50 mL/Hr) IV Continuous <Continuous>  dextrose 50% Injectable 12.5 Gram(s) IV Push once  dextrose 50% Injectable 25 Gram(s) IV Push once  dextrose 50% Injectable 25 Gram(s) IV Push once  heparin  Injectable 5000 Unit(s) SubCutaneous every 12 hours  insulin lispro (HumaLOG) corrective regimen sliding scale   SubCutaneous three times a day before meals  insulin lispro (HumaLOG) corrective regimen sliding scale   SubCutaneous at bedtime  lisinopril 20 milliGRAM(s) Oral daily  simvastatin 20 milliGRAM(s) Oral at bedtime    MEDICATIONS  (PRN):  acetaminophen   Tablet 650 milliGRAM(s) Oral every 6 hours PRN For Temp greater than 38 C (100.4 F)  ALBUTerol/ipratropium for Nebulization 3 milliLiter(s) Nebulizer every 6 hours PRN Shortness of Breath and/or Wheezing  dextrose Gel 1 Dose(s) Oral once PRN Blood Glucose LESS THAN 70 milliGRAM(s)/deciliter  glucagon  Injectable 1 milliGRAM(s) IntraMuscular once PRN Glucose LESS THAN 70 milligrams/deciliter  guaiFENesin   Syrup  (Sugar-Free) 200 milliGRAM(s) Oral every 6 hours PRN Cough  sodium chloride 0.65% Nasal 1 Spray(s) Both Nostrils four times a day PRN Nasal Congestion    Vital Signs Last 24 Hrs  T(C): 36.6 (22 Jan 2018 05:11), Max: 37.2 (21 Jan 2018 12:00)  T(F): 97.9 (22 Jan 2018 05:11), Max: 99 (21 Jan 2018 12:00)  HR: 86 (22 Jan 2018 05:11) (68 - 94)  BP: 147/71 (22 Jan 2018 05:11) (112/69 - 151/82)  BP(mean): --  RR: 18 (22 Jan 2018 05:11) (16 - 18)  SpO2: 96% (22 Jan 2018 05:11) (95% - 96%)  CAPILLARY BLOOD GLUCOSE    POCT Blood Glucose.: 263 mg/dL (21 Jan 2018 22:55)  POCT Blood Glucose.: 147 mg/dL (21 Jan 2018 17:26)  POCT Blood Glucose.: 207 mg/dL (21 Jan 2018 13:00)  POCT Blood Glucose.: 190 mg/dL (21 Jan 2018 08:47)    I&O's Summary    21 Jan 2018 07:01  -  22 Jan 2018 07:00  --------------------------------------------------------  IN: 700 mL / OUT: 0 mL / NET: 700 mL        PHYSICAL EXAM:  GENERAL: NAD, well-developed  HEAD:  Atraumatic, Normocephalic  EYES: EOMI,  conjunctiva and sclera clear  NECK: Supple  CHEST/LUNG: coarse breath sounds bilaterally.  HEART: Regular rate and rhythm; No murmurs, rubs, or gallops  ABDOMEN: Soft, Nontender, Nondistended; Bowel sounds present  EXTREMITIES:  No clubbing, cyanosis, or edema  PSYCH: AAOx3  NEUROLOGY: non-focal  SKIN: No rashes or lesions    LABS:    WBC Trend: 7.3<--, 6.3<--, 6.5<--        Creatinine Trend: 0.70<--, 0.61<--              RADIOLOGY & ADDITIONAL TESTS:    Imaging Personally Reviewed: x    Consultant(s) Notes Reviewed:  ID

## 2018-01-22 NOTE — PROGRESS NOTE ADULT - PROBLEM SELECTOR PLAN 7
- High risk of DVT (improve score 3, 7% symptomatic VTE risk  - heparin SQ for ppx  - Diet carb consistent  - DC planning for tmrw, pt has HHA which req's 24 hrs advanced notice. - High risk of DVT (improve score 3, 7% symptomatic VTE risk  - heparin SQ for ppx  - Diet carb consistent  - DC planning for today pt has HHA which req's 24 hrs advanced notice. - High risk of DVT (improve score 3, 7% symptomatic VTE risk  - heparin SQ for ppx  - Diet carb consistent  - DC planning for today pt has HHA which req's 24 hrs advanced notice.  DISCHARGE TODAY 60 MINUTES minutes spent discharge planning. Will need f/u with PCP, endo, pulm. reinstate HHA services.

## 2018-01-22 NOTE — PROGRESS NOTE ADULT - PROBLEM SELECTOR PLAN 3
Continue with supportive care   - guaifenesin, nasal saline, duonebs PRN. Continue with supportive care   - will d/c on guaifenesin dextromethorphan , nasal saline, albuterol MDI  inhaler   -will d/c on tylenol Continue with supportive care   - will d/c on guaifenesin dextromethorphan, nasal saline, albuterol MDI  inhaler, tylenol prn pain

## 2018-01-22 NOTE — PROGRESS NOTE ADULT - ASSESSMENT
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 Currently in progressing of ruling out recurrent Tb infection. 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

## 2018-01-23 LAB — FUNGITELL: <31 PG/ML — SIGNIFICANT CHANGE UP (ref 0–59)

## 2018-01-23 NOTE — ED ADULT NURSE NOTE - ED STAT RN HANDOFF WHERE
Already spoke to patient yesterday  
[facility].  , JENNY WEN, HAS NORMAL SEMEN ANALYSIS RESULTS.    
Red

## 2018-03-10 LAB
CULTURE RESULTS: SIGNIFICANT CHANGE UP
CULTURE RESULTS: SIGNIFICANT CHANGE UP
SPECIMEN SOURCE: SIGNIFICANT CHANGE UP
SPECIMEN SOURCE: SIGNIFICANT CHANGE UP

## 2018-03-14 LAB
CULTURE RESULTS: SIGNIFICANT CHANGE UP
SPECIMEN SOURCE: SIGNIFICANT CHANGE UP

## 2018-07-06 NOTE — PHYSICAL THERAPY INITIAL EVALUATION ADULT - LEVEL OF INDEPENDENCE: GAIT, REHAB EVAL
Quality 130: Documentation Of Current Medications In The Medical Record: Current Medications Documented Quality 111:Pneumonia Vaccination Status For Older Adults: Pneumococcal Vaccination Previously Received Detail Level: Detailed Quality 110: Preventive Care And Screening: Influenza Immunization: Influenza Immunization Administered during Influenza season supervision

## 2018-08-20 ENCOUNTER — EMERGENCY (EMERGENCY)
Facility: HOSPITAL | Age: 83
LOS: 1 days | Discharge: HOME | End: 2018-08-20
Attending: EMERGENCY MEDICINE
Payer: MEDICARE

## 2018-08-20 VITALS
RESPIRATION RATE: 19 BRPM | DIASTOLIC BLOOD PRESSURE: 82 MMHG | SYSTOLIC BLOOD PRESSURE: 178 MMHG | HEART RATE: 68 BPM | TEMPERATURE: 98 F | OXYGEN SATURATION: 97 %

## 2018-08-20 VITALS
DIASTOLIC BLOOD PRESSURE: 70 MMHG | RESPIRATION RATE: 20 BRPM | SYSTOLIC BLOOD PRESSURE: 149 MMHG | HEART RATE: 67 BPM | OXYGEN SATURATION: 99 % | TEMPERATURE: 99 F | WEIGHT: 171.96 LBS

## 2018-08-20 DIAGNOSIS — Z90.710 ACQUIRED ABSENCE OF BOTH CERVIX AND UTERUS: Chronic | ICD-10-CM

## 2018-08-20 DIAGNOSIS — Z90.12 ACQUIRED ABSENCE OF LEFT BREAST AND NIPPLE: Chronic | ICD-10-CM

## 2018-08-20 LAB
ALBUMIN SERPL ELPH-MCNC: 3.9 G/DL — SIGNIFICANT CHANGE UP (ref 3.3–5)
ALP SERPL-CCNC: 80 U/L — SIGNIFICANT CHANGE UP (ref 40–120)
ALT FLD-CCNC: 12 U/L — SIGNIFICANT CHANGE UP (ref 10–45)
ANION GAP SERPL CALC-SCNC: 12 MMOL/L — SIGNIFICANT CHANGE UP (ref 5–17)
APTT BLD: 28.7 SEC — SIGNIFICANT CHANGE UP (ref 27.5–37.4)
AST SERPL-CCNC: 17 U/L — SIGNIFICANT CHANGE UP (ref 10–40)
BASOPHILS # BLD AUTO: 0 K/UL — SIGNIFICANT CHANGE UP (ref 0–0.2)
BASOPHILS NFR BLD AUTO: 0.3 % — SIGNIFICANT CHANGE UP (ref 0–2)
BILIRUB SERPL-MCNC: 0.1 MG/DL — LOW (ref 0.2–1.2)
BUN SERPL-MCNC: 12 MG/DL — SIGNIFICANT CHANGE UP (ref 7–23)
CALCIUM SERPL-MCNC: 9.2 MG/DL — SIGNIFICANT CHANGE UP (ref 8.4–10.5)
CHLORIDE SERPL-SCNC: 107 MMOL/L — SIGNIFICANT CHANGE UP (ref 96–108)
CO2 SERPL-SCNC: 23 MMOL/L — SIGNIFICANT CHANGE UP (ref 22–31)
CREAT SERPL-MCNC: 0.66 MG/DL — SIGNIFICANT CHANGE UP (ref 0.5–1.3)
EOSINOPHIL # BLD AUTO: 0.3 K/UL — SIGNIFICANT CHANGE UP (ref 0–0.5)
EOSINOPHIL NFR BLD AUTO: 4.4 % — SIGNIFICANT CHANGE UP (ref 0–6)
GAS PNL BLDV: SIGNIFICANT CHANGE UP
GLUCOSE SERPL-MCNC: 160 MG/DL — HIGH (ref 70–99)
HCT VFR BLD CALC: 40.2 % — SIGNIFICANT CHANGE UP (ref 34.5–45)
HGB BLD-MCNC: 13.1 G/DL — SIGNIFICANT CHANGE UP (ref 11.5–15.5)
INR BLD: 1.02 RATIO — SIGNIFICANT CHANGE UP (ref 0.88–1.16)
LYMPHOCYTES # BLD AUTO: 1.9 K/UL — SIGNIFICANT CHANGE UP (ref 1–3.3)
LYMPHOCYTES # BLD AUTO: 28.2 % — SIGNIFICANT CHANGE UP (ref 13–44)
MCHC RBC-ENTMCNC: 28.2 PG — SIGNIFICANT CHANGE UP (ref 27–34)
MCHC RBC-ENTMCNC: 32.4 GM/DL — SIGNIFICANT CHANGE UP (ref 32–36)
MCV RBC AUTO: 86.9 FL — SIGNIFICANT CHANGE UP (ref 80–100)
MONOCYTES # BLD AUTO: 0.6 K/UL — SIGNIFICANT CHANGE UP (ref 0–0.9)
MONOCYTES NFR BLD AUTO: 9.1 % — SIGNIFICANT CHANGE UP (ref 2–14)
NEUTROPHILS # BLD AUTO: 3.9 K/UL — SIGNIFICANT CHANGE UP (ref 1.8–7.4)
NEUTROPHILS NFR BLD AUTO: 58 % — SIGNIFICANT CHANGE UP (ref 43–77)
NT-PROBNP SERPL-SCNC: 126 PG/ML — SIGNIFICANT CHANGE UP (ref 0–300)
PLATELET # BLD AUTO: 304 K/UL — SIGNIFICANT CHANGE UP (ref 150–400)
POTASSIUM SERPL-MCNC: 4.3 MMOL/L — SIGNIFICANT CHANGE UP (ref 3.5–5.3)
POTASSIUM SERPL-SCNC: 4.3 MMOL/L — SIGNIFICANT CHANGE UP (ref 3.5–5.3)
PROT SERPL-MCNC: 7.4 G/DL — SIGNIFICANT CHANGE UP (ref 6–8.3)
PROTHROM AB SERPL-ACNC: 11.1 SEC — SIGNIFICANT CHANGE UP (ref 9.8–12.7)
RBC # BLD: 4.63 M/UL — SIGNIFICANT CHANGE UP (ref 3.8–5.2)
RBC # FLD: 13.3 % — SIGNIFICANT CHANGE UP (ref 10.3–14.5)
SODIUM SERPL-SCNC: 142 MMOL/L — SIGNIFICANT CHANGE UP (ref 135–145)
TROPONIN T, HIGH SENSITIVITY RESULT: 9 NG/L — SIGNIFICANT CHANGE UP (ref 0–51)
TROPONIN T, HIGH SENSITIVITY RESULT: 9 NG/L — SIGNIFICANT CHANGE UP (ref 0–51)
WBC # BLD: 6.8 K/UL — SIGNIFICANT CHANGE UP (ref 3.8–10.5)
WBC # FLD AUTO: 6.8 K/UL — SIGNIFICANT CHANGE UP (ref 3.8–10.5)

## 2018-08-20 PROCEDURE — 71275 CT ANGIOGRAPHY CHEST: CPT

## 2018-08-20 PROCEDURE — 82330 ASSAY OF CALCIUM: CPT

## 2018-08-20 PROCEDURE — 99284 EMERGENCY DEPT VISIT MOD MDM: CPT | Mod: 25

## 2018-08-20 PROCEDURE — 80053 COMPREHEN METABOLIC PANEL: CPT

## 2018-08-20 PROCEDURE — 71046 X-RAY EXAM CHEST 2 VIEWS: CPT

## 2018-08-20 PROCEDURE — 93010 ELECTROCARDIOGRAM REPORT: CPT

## 2018-08-20 PROCEDURE — 85027 COMPLETE CBC AUTOMATED: CPT

## 2018-08-20 PROCEDURE — 71046 X-RAY EXAM CHEST 2 VIEWS: CPT | Mod: 26

## 2018-08-20 PROCEDURE — 82947 ASSAY GLUCOSE BLOOD QUANT: CPT

## 2018-08-20 PROCEDURE — 99285 EMERGENCY DEPT VISIT HI MDM: CPT | Mod: 25

## 2018-08-20 PROCEDURE — 82435 ASSAY OF BLOOD CHLORIDE: CPT

## 2018-08-20 PROCEDURE — 85014 HEMATOCRIT: CPT

## 2018-08-20 PROCEDURE — 71275 CT ANGIOGRAPHY CHEST: CPT | Mod: 26

## 2018-08-20 PROCEDURE — 82803 BLOOD GASES ANY COMBINATION: CPT

## 2018-08-20 PROCEDURE — 84132 ASSAY OF SERUM POTASSIUM: CPT

## 2018-08-20 PROCEDURE — 85730 THROMBOPLASTIN TIME PARTIAL: CPT

## 2018-08-20 PROCEDURE — 83880 ASSAY OF NATRIURETIC PEPTIDE: CPT

## 2018-08-20 PROCEDURE — 85610 PROTHROMBIN TIME: CPT

## 2018-08-20 PROCEDURE — 93005 ELECTROCARDIOGRAM TRACING: CPT

## 2018-08-20 PROCEDURE — 84484 ASSAY OF TROPONIN QUANT: CPT

## 2018-08-20 PROCEDURE — 84295 ASSAY OF SERUM SODIUM: CPT

## 2018-08-20 PROCEDURE — 83605 ASSAY OF LACTIC ACID: CPT

## 2018-08-20 NOTE — ED ADULT NURSE NOTE - NSIMPLEMENTINTERV_GEN_ALL_ED
Implemented All Fall with Harm Risk Interventions:  [place] to call system. Call bell, personal items and telephone within reach. Instruct patient to call for assistance. Room bathroom lighting operational. Non-slip footwear when patient is off stretcher. Physically safe environment: no spills, clutter or unnecessary equipment. Stretcher in lowest position, wheels locked, appropriate side rails in place. Provide visual cue, wrist band, yellow gown, etc. Monitor gait and stability. Monitor for mental status changes and reorient to person, place, and time. Review medications for side effects contributing to fall risk. Reinforce activity limits and safety measures with patient and family. Provide visual clues: red socks.

## 2018-08-20 NOTE — ED ADULT NURSE REASSESSMENT NOTE - NS ED NURSE REASSESS COMMENT FT1
Pt returned from CT, VSS. Pt remains a&oX4, breathing spontaneously and unlabored- no work of breathing noted at this time. Pt still reports mild pain to R. rib area. Awaiting CT results. safety maintained.

## 2018-08-20 NOTE — ED ADULT NURSE NOTE - OBJECTIVE STATEMENT
87y Female TB, breast CA with L.mastectomy, htn, hld, PE not currently on blood thinners, DM presents to the ED c/o SOB. Per daughter at bedside, pt was carrying a "large ceramic flower pot" two weeks ago and has been complaining of pain to the R. upper back with radiation to R. under breast, worsening in the last 2-3 days. Pt also reporting SOB for two weeks and states the pain gets worse with deep breaths. Pt has been taking Tylenol with relief but states the pain comes back in full force as soon as it wares off. Pt presents a&oX4, well in appearance, airway intact, breathing spontaneously and unlabored, lung sounds bilaterally, no ecchymosis/bleeding noted to R. rib area, skin warm dry and intact, cap refill <2 seconds, denies cough, congestion, CP, ha, dizziness, abd pain, n/v/d, chills/fever. MD at bedside for eval. Safety maintained.

## 2018-08-20 NOTE — ED PROVIDER NOTE - PLAN OF CARE
1. Follow up with PMD in 1-2 days.   2. Additionally please follow up with your cardiologist or our cardiology clinic ([phone]) within the next 2-3 days.   3. Rest, stay hydrated and continue all your previously prescribed home medications.   4. Apply warm compresses or intermittent ice to the area of pain. Take Tylenol 650mg 1 tab every 4-6 hours as needed for pain or fever greater than 99.9.  5. Return to the ED immediately if you develop any new/worsening symptoms including shortness of breath, especially on exertion, chest pain, coughing up blood, dizziness, fatigue, rash or blisters, particularly over your area of pain, fever/chills, or any other concerning symptoms.

## 2018-08-20 NOTE — ED PROVIDER NOTE - PROGRESS NOTE DETAILS
Jorge Josehp MD FACEP  patient with no noted pe on cta of chest, educated patient on finding and need to  follow up with primary medical doctor for further issue and return for any persistent or worsening features.   No immediate life threatening issues present on history or clinical exam. Patient is a safe disposition home, has capacity and insight into their condition, is ambulatory in the Emergency Department with no further questions and will follow up with their doctor(s) this week. Patient  understands anticipatory guidance and was given strict return and follow up precautions. The patient has been informed of all concerning signs and symptoms to return to Emergency Department, the necessity to follow up with the PMD/Clinic/follow up provided within 2-3 days was explained, and the patient reports understanding of above with capacity and insight.

## 2018-08-20 NOTE — ED PROVIDER NOTE - OBJECTIVE STATEMENT
88 yo female PMHx of HTN, HLD, type II diabetes, breast CA s/p mastectomy, TB, PE s/p enoxaparin 2013 not on any current anticoagulation presents to the ED c/o progressively worsening shortness of breath and R sided lateral rib pain that has worsened over the last 3 days. Additionally today patient now endorsing episode of midsternal chest pain in associated with the same shortness of breath and R sided rib pain. Pain felt at rest but worsened with deep inspiration and exertion. Pain in R ribs is felt most with deep inspiration, when bending down, and with direct touch. Additionally endorses new ankle swelling. Denies recent fall, trauma to the area, fever/chills, cough, recent hospitalization/immobilization, hemoptysis, nausea/vomiting, numbness/tingling, speech/visual changes, calf pain, dizziness.

## 2018-08-20 NOTE — ED PROVIDER NOTE - CARE PLAN
Principal Discharge DX:	Shortness of breath  Assessment and plan of treatment:	1. Follow up with PMD in 1-2 days.   2. Additionally please follow up with your cardiologist or our cardiology clinic ([phone]) within the next 2-3 days.   3. Rest, stay hydrated and continue all your previously prescribed home medications.   4. Apply warm compresses or intermittent ice to the area of pain. Take Tylenol 650mg 1 tab every 4-6 hours as needed for pain or fever greater than 99.9.  5. Return to the ED immediately if you develop any new/worsening symptoms including shortness of breath, especially on exertion, chest pain, coughing up blood, dizziness, fatigue, rash or blisters, particularly over your area of pain, fever/chills, or any other concerning symptoms.  Secondary Diagnosis:	Muscle strain

## 2018-08-20 NOTE — ED PROVIDER NOTE - MEDICAL DECISION MAKING DETAILS
JAYE De La Cruz MD: Pt is an 88 y/o female with PMHx of HTN, HLD, type II diabetes, breast CA s/p mastectomy, TB, PE s/p enoxaparin 2013 (not on any current anticoagulation) who presents to the ED c/o progressively worsening shortness of breath and R sided lateral rib pain that has worsened over the last 3 days. Additionally today patient now endorsing episode of midsternal chest pain in associated with the shortness of breath and R sided rib pain. Pain felt at rest but worsened with deep inspiration and exertion. Pain in R ribs is felt most with deep inspiration, when bending down, and with direct touch. Additionally endorses new ankle swelling. Denies recent fall, trauma to the area, fever/chills, cough, recent hospitalization/immobilization, hemoptysis, nausea/vomiting, numbness/tingling, speech/visual changes, calf pain, dizziness. DDx: PE, ACS, MSK pain, GERD. Plan: basic labs, troponin, CTA chest, pain control, re-eval

## 2018-08-20 NOTE — ED PROVIDER NOTE - RESPIRATORY NORMAL BREATH SOUNDS
Clear vesicular breath sounds all anterior and posterior lung fields with no rebound, guarding, or rigidity noted.

## 2018-10-14 NOTE — ED PROVIDER NOTE - ENMT NEGATIVE STATEMENT, MLM
135
Ears: no ear pain and no hearing problems.Nose: no nasal congestion and no nasal drainage.Mouth/Throat: no dysphagia, no hoarseness and no throat pain.Neck: no lumps, no pain, no stiffness and no swollen glands.

## 2019-05-30 ENCOUNTER — EMERGENCY (EMERGENCY)
Facility: HOSPITAL | Age: 84
LOS: 1 days | Discharge: HOME | End: 2019-05-30
Attending: EMERGENCY MEDICINE
Payer: MEDICARE

## 2019-05-30 VITALS
SYSTOLIC BLOOD PRESSURE: 172 MMHG | DIASTOLIC BLOOD PRESSURE: 73 MMHG | OXYGEN SATURATION: 94 % | HEART RATE: 73 BPM | RESPIRATION RATE: 16 BRPM | TEMPERATURE: 98 F

## 2019-05-30 VITALS
SYSTOLIC BLOOD PRESSURE: 163 MMHG | RESPIRATION RATE: 18 BRPM | DIASTOLIC BLOOD PRESSURE: 61 MMHG | WEIGHT: 164.02 LBS | HEART RATE: 84 BPM | TEMPERATURE: 98 F | OXYGEN SATURATION: 93 % | HEIGHT: 63 IN

## 2019-05-30 DIAGNOSIS — Z90.710 ACQUIRED ABSENCE OF BOTH CERVIX AND UTERUS: Chronic | ICD-10-CM

## 2019-05-30 DIAGNOSIS — Z90.12 ACQUIRED ABSENCE OF LEFT BREAST AND NIPPLE: Chronic | ICD-10-CM

## 2019-05-30 PROCEDURE — 72170 X-RAY EXAM OF PELVIS: CPT

## 2019-05-30 PROCEDURE — 76377 3D RENDER W/INTRP POSTPROCES: CPT

## 2019-05-30 PROCEDURE — 71045 X-RAY EXAM CHEST 1 VIEW: CPT

## 2019-05-30 PROCEDURE — 99284 EMERGENCY DEPT VISIT MOD MDM: CPT | Mod: 25

## 2019-05-30 PROCEDURE — 71045 X-RAY EXAM CHEST 1 VIEW: CPT | Mod: 26

## 2019-05-30 PROCEDURE — 72192 CT PELVIS W/O DYE: CPT

## 2019-05-30 PROCEDURE — 99284 EMERGENCY DEPT VISIT MOD MDM: CPT

## 2019-05-30 PROCEDURE — 76377 3D RENDER W/INTRP POSTPROCES: CPT | Mod: 26

## 2019-05-30 PROCEDURE — 72192 CT PELVIS W/O DYE: CPT | Mod: 26

## 2019-05-30 PROCEDURE — 72170 X-RAY EXAM OF PELVIS: CPT | Mod: 26

## 2019-05-30 RX ORDER — OXYCODONE HYDROCHLORIDE 5 MG/1
5 TABLET ORAL ONCE
Refills: 0 | Status: DISCONTINUED | OUTPATIENT
Start: 2019-05-30 | End: 2019-05-30

## 2019-05-30 RX ORDER — IBUPROFEN 200 MG
600 TABLET ORAL ONCE
Refills: 0 | Status: COMPLETED | OUTPATIENT
Start: 2019-05-30 | End: 2019-05-30

## 2019-05-30 RX ORDER — ACETAMINOPHEN 500 MG
975 TABLET ORAL ONCE
Refills: 0 | Status: COMPLETED | OUTPATIENT
Start: 2019-05-30 | End: 2019-05-30

## 2019-05-30 RX ADMIN — Medication 600 MILLIGRAM(S): at 12:04

## 2019-05-30 RX ADMIN — OXYCODONE HYDROCHLORIDE 5 MILLIGRAM(S): 5 TABLET ORAL at 13:29

## 2019-05-30 RX ADMIN — Medication 975 MILLIGRAM(S): at 12:34

## 2019-05-30 RX ADMIN — Medication 600 MILLIGRAM(S): at 12:34

## 2019-05-30 RX ADMIN — OXYCODONE HYDROCHLORIDE 5 MILLIGRAM(S): 5 TABLET ORAL at 14:00

## 2019-05-30 RX ADMIN — Medication 975 MILLIGRAM(S): at 12:04

## 2019-05-30 NOTE — ED PROVIDER NOTE - PROGRESS NOTE DETAILS
lAirio HUNT MD PGY1: CT Hip shows no fracture. Patient able to walk with my assistance acting as her walker. Patient uses a walker at home. Per daughter patient is approximately at baseline.

## 2019-05-30 NOTE — ED PROVIDER NOTE - OBJECTIVE STATEMENT
Alirio HUNT MD PGY1: 88 F PMH as described uses a charilift to get up stairs and a walker at baseline p/w increasing L hip and chest pain without difficulty breathing after falling and hitting L side into adjacent wall while trying to get up stairs as lift wasn't working. No head trauma, no LOC and has been able to ambulate but just a little slower than usual per her daughter. No other sxs.

## 2019-05-30 NOTE — ED PROVIDER NOTE - ATTENDING CONTRIBUTION TO CARE
88 F PMH as described uses a charTaifatechft to get up stairs which wasn't working recently with fall to side last week, with same pain for over a month before with l sided back pain radiates to groin region, from of hip, no fx on plain films, analgesia ordered, reassess.

## 2019-05-30 NOTE — ED ADULT NURSE NOTE - OBJECTIVE STATEMENT
88yrs old female present to the ER with her daughter for s/p fall. As per pt she was going up her stair case at home  on Sunday with her walker when she missed a step and fell . Pt reports that she fell on her left side. Pt reports pain level of 10/10 to the left rib area  on pain scale and aching in quality. Pt report that she was able to walk after the fall , but has being shuffling. Denies hitting her head and LOC. Reports that she has being using heating pad and taking Tylenols for pain relieve.

## 2019-05-30 NOTE — ED PROVIDER NOTE - FAMILY HISTORY
Child  Still living? Unknown  Family history of breast cancer, Age at diagnosis: Age Unknown     Mother  Still living? Unknown  Family history of active pulmonary tuberculosis in patient's mother, Age at diagnosis: Age Unknown

## 2019-05-30 NOTE — ED PROVIDER NOTE - CLINICAL SUMMARY MEDICAL DECISION MAKING FREE TEXT BOX
Alirio HUNT MD PGY1: 88 F p/w L hip and chest pain s/p fall on Sunday without diffuclty breathing and tolerating weight on L hip just moving slightly slower than usual. Will obtain XR L hip and chest to eval for PTX. Will not CT scan for rib fx, as patient has no difficulty moving Alirio HUNT MD PGY1: 88 F p/w L hip and chest pain s/p fall on Sunday without diffuclty breathing and tolerating weight on L hip just moving slightly slower than usual. Will obtain XR L hip and chest to eval for PTX. Will not CT scan for rib fx, as patient has no difficulty breathing

## 2019-05-30 NOTE — ED PROVIDER NOTE - PHYSICAL EXAMINATION
Alirio HUNT MD PGY1:   PHYSICAL EXAM:    GENERAL: NAD, well-developed  HEENT:  Atraumatic, Normocephalic  CHEST/LUNG: Chest rise equal bilaterally. L chest TTP. No difficulty taking deep breaths.   HEART: Regular rate and rhythm  ABDOMEN: Soft, Nontender, Nondistended  PSYCH: A&Ox3  SKIN: No obvious rashes or lesions  MSK: Able to move L hip with internal and external rotation   NEUROLOGY: strength 5/5 assessed across L hip, knee and ankle.

## 2019-05-30 NOTE — ED ADULT NURSE REASSESSMENT NOTE - NS ED NURSE REASSESS COMMENT FT1
Pt reported that the medications that were given prior did not alleviate the pain; given oxycodone 5mg IR as ordered.

## 2019-05-30 NOTE — ED PROVIDER NOTE - NSFOLLOWUPCLINICS_GEN_ALL_ED_FT
[facility] - Primary Care  Primary Care  [address] [place] [address]  Phone: [phone]  Fax:   Follow Up Time:

## 2019-06-01 ENCOUNTER — OUTPATIENT (OUTPATIENT)
Dept: OUTPATIENT SERVICES | Facility: HOSPITAL | Age: 84
LOS: 1 days | End: 2019-06-01
Payer: MEDICARE

## 2019-06-01 DIAGNOSIS — Z90.710 ACQUIRED ABSENCE OF BOTH CERVIX AND UTERUS: Chronic | ICD-10-CM

## 2019-06-01 DIAGNOSIS — Z90.12 ACQUIRED ABSENCE OF LEFT BREAST AND NIPPLE: Chronic | ICD-10-CM

## 2019-06-01 PROCEDURE — G9001: CPT

## 2019-06-10 DIAGNOSIS — Z71.89 OTHER SPECIFIED COUNSELING: ICD-10-CM

## 2019-07-11 ENCOUNTER — INPATIENT (INPATIENT)
Facility: HOSPITAL | Age: 84
LOS: 4 days | Discharge: HOME | DRG: 312 | End: 2019-07-16
Attending: HOSPITALIST | Admitting: INTERNAL MEDICINE
Payer: MEDICARE

## 2019-07-11 VITALS
HEART RATE: 76 BPM | TEMPERATURE: 98 F | HEIGHT: 63 IN | WEIGHT: 164.02 LBS | SYSTOLIC BLOOD PRESSURE: 128 MMHG | DIASTOLIC BLOOD PRESSURE: 71 MMHG | OXYGEN SATURATION: 95 % | RESPIRATION RATE: 18 BRPM

## 2019-07-11 DIAGNOSIS — Z90.12 ACQUIRED ABSENCE OF LEFT BREAST AND NIPPLE: Chronic | ICD-10-CM

## 2019-07-11 DIAGNOSIS — Z90.710 ACQUIRED ABSENCE OF BOTH CERVIX AND UTERUS: Chronic | ICD-10-CM

## 2019-07-11 DIAGNOSIS — R26.2 DIFFICULTY IN WALKING, NOT ELSEWHERE CLASSIFIED: ICD-10-CM

## 2019-07-11 LAB
ALBUMIN SERPL ELPH-MCNC: 4.1 G/DL — SIGNIFICANT CHANGE UP (ref 3.3–5)
ALP SERPL-CCNC: 59 U/L — SIGNIFICANT CHANGE UP (ref 40–120)
ALT FLD-CCNC: 14 U/L — SIGNIFICANT CHANGE UP (ref 10–45)
ANION GAP SERPL CALC-SCNC: 15 MMOL/L — SIGNIFICANT CHANGE UP (ref 5–17)
APPEARANCE UR: CLEAR — SIGNIFICANT CHANGE UP
AST SERPL-CCNC: 27 U/L — SIGNIFICANT CHANGE UP (ref 10–40)
BACTERIA # UR AUTO: NEGATIVE — SIGNIFICANT CHANGE UP
BASOPHILS # BLD AUTO: 0.1 K/UL — SIGNIFICANT CHANGE UP (ref 0–0.2)
BASOPHILS NFR BLD AUTO: 0.7 % — SIGNIFICANT CHANGE UP (ref 0–2)
BILIRUB SERPL-MCNC: 0.3 MG/DL — SIGNIFICANT CHANGE UP (ref 0.2–1.2)
BILIRUB UR-MCNC: NEGATIVE — SIGNIFICANT CHANGE UP
BUN SERPL-MCNC: 32 MG/DL — HIGH (ref 7–23)
CALCIUM SERPL-MCNC: 9 MG/DL — SIGNIFICANT CHANGE UP (ref 8.4–10.5)
CHLORIDE SERPL-SCNC: 99 MMOL/L — SIGNIFICANT CHANGE UP (ref 96–108)
CO2 SERPL-SCNC: 22 MMOL/L — SIGNIFICANT CHANGE UP (ref 22–31)
COLOR SPEC: SIGNIFICANT CHANGE UP
CREAT SERPL-MCNC: 0.92 MG/DL — SIGNIFICANT CHANGE UP (ref 0.5–1.3)
DIFF PNL FLD: NEGATIVE — SIGNIFICANT CHANGE UP
EOSINOPHIL # BLD AUTO: 0.4 K/UL — SIGNIFICANT CHANGE UP (ref 0–0.5)
EOSINOPHIL NFR BLD AUTO: 3.9 % — SIGNIFICANT CHANGE UP (ref 0–6)
EPI CELLS # UR: 1 /HPF — SIGNIFICANT CHANGE UP
GAS PNL BLDV: SIGNIFICANT CHANGE UP
GLUCOSE SERPL-MCNC: 139 MG/DL — HIGH (ref 70–99)
GLUCOSE UR QL: NEGATIVE — SIGNIFICANT CHANGE UP
HCT VFR BLD CALC: 37.6 % — SIGNIFICANT CHANGE UP (ref 34.5–45)
HGB BLD-MCNC: 13 G/DL — SIGNIFICANT CHANGE UP (ref 11.5–15.5)
HYALINE CASTS # UR AUTO: 2 /LPF — SIGNIFICANT CHANGE UP (ref 0–2)
KETONES UR-MCNC: NEGATIVE — SIGNIFICANT CHANGE UP
LEUKOCYTE ESTERASE UR-ACNC: ABNORMAL
LYMPHOCYTES # BLD AUTO: 2.6 K/UL — SIGNIFICANT CHANGE UP (ref 1–3.3)
LYMPHOCYTES # BLD AUTO: 29.2 % — SIGNIFICANT CHANGE UP (ref 13–44)
MAGNESIUM SERPL-MCNC: 1.9 MG/DL — SIGNIFICANT CHANGE UP (ref 1.6–2.6)
MCHC RBC-ENTMCNC: 29.6 PG — SIGNIFICANT CHANGE UP (ref 27–34)
MCHC RBC-ENTMCNC: 34.6 GM/DL — SIGNIFICANT CHANGE UP (ref 32–36)
MCV RBC AUTO: 85.6 FL — SIGNIFICANT CHANGE UP (ref 80–100)
MONOCYTES # BLD AUTO: 0.7 K/UL — SIGNIFICANT CHANGE UP (ref 0–0.9)
MONOCYTES NFR BLD AUTO: 7.6 % — SIGNIFICANT CHANGE UP (ref 2–14)
NEUTROPHILS # BLD AUTO: 5.3 K/UL — SIGNIFICANT CHANGE UP (ref 1.8–7.4)
NEUTROPHILS NFR BLD AUTO: 58.5 % — SIGNIFICANT CHANGE UP (ref 43–77)
NITRITE UR-MCNC: NEGATIVE — SIGNIFICANT CHANGE UP
PH UR: 6 — SIGNIFICANT CHANGE UP (ref 5–8)
PHOSPHATE SERPL-MCNC: 4.2 MG/DL — SIGNIFICANT CHANGE UP (ref 2.5–4.5)
PLATELET # BLD AUTO: 293 K/UL — SIGNIFICANT CHANGE UP (ref 150–400)
POTASSIUM SERPL-MCNC: 5.8 MMOL/L — HIGH (ref 3.5–5.3)
POTASSIUM SERPL-SCNC: 5.8 MMOL/L — HIGH (ref 3.5–5.3)
PROT SERPL-MCNC: 7.2 G/DL — SIGNIFICANT CHANGE UP (ref 6–8.3)
PROT UR-MCNC: NEGATIVE — SIGNIFICANT CHANGE UP
RBC # BLD: 4.39 M/UL — SIGNIFICANT CHANGE UP (ref 3.8–5.2)
RBC # FLD: 12.3 % — SIGNIFICANT CHANGE UP (ref 10.3–14.5)
RBC CASTS # UR COMP ASSIST: 1 /HPF — SIGNIFICANT CHANGE UP (ref 0–4)
SODIUM SERPL-SCNC: 136 MMOL/L — SIGNIFICANT CHANGE UP (ref 135–145)
SP GR SPEC: 1.01 — SIGNIFICANT CHANGE UP (ref 1.01–1.02)
TROPONIN T, HIGH SENSITIVITY RESULT: 10 NG/L — SIGNIFICANT CHANGE UP (ref 0–51)
UROBILINOGEN FLD QL: NEGATIVE — SIGNIFICANT CHANGE UP
WBC # BLD: 9 K/UL — SIGNIFICANT CHANGE UP (ref 3.8–10.5)
WBC # FLD AUTO: 9 K/UL — SIGNIFICANT CHANGE UP (ref 3.8–10.5)
WBC UR QL: 10 /HPF — HIGH (ref 0–5)

## 2019-07-11 PROCEDURE — 71045 X-RAY EXAM CHEST 1 VIEW: CPT | Mod: 26

## 2019-07-11 PROCEDURE — 99285 EMERGENCY DEPT VISIT HI MDM: CPT | Mod: GC

## 2019-07-11 PROCEDURE — 93010 ELECTROCARDIOGRAM REPORT: CPT

## 2019-07-11 PROCEDURE — 70450 CT HEAD/BRAIN W/O DYE: CPT | Mod: 26

## 2019-07-11 RX ORDER — SODIUM CHLORIDE 9 MG/ML
1000 INJECTION INTRAMUSCULAR; INTRAVENOUS; SUBCUTANEOUS ONCE
Refills: 0 | Status: COMPLETED | OUTPATIENT
Start: 2019-07-11 | End: 2019-07-11

## 2019-07-11 RX ORDER — DEXTROSE 50 % IN WATER 50 %
25 SYRINGE (ML) INTRAVENOUS ONCE
Refills: 0 | Status: DISCONTINUED | OUTPATIENT
Start: 2019-07-11 | End: 2019-07-16

## 2019-07-11 RX ORDER — INSULIN LISPRO 100/ML
VIAL (ML) SUBCUTANEOUS AT BEDTIME
Refills: 0 | Status: DISCONTINUED | OUTPATIENT
Start: 2019-07-11 | End: 2019-07-16

## 2019-07-11 RX ORDER — GLUCAGON INJECTION, SOLUTION 0.5 MG/.1ML
1 INJECTION, SOLUTION SUBCUTANEOUS ONCE
Refills: 0 | Status: DISCONTINUED | OUTPATIENT
Start: 2019-07-11 | End: 2019-07-16

## 2019-07-11 RX ORDER — DEXTROSE 50 % IN WATER 50 %
12.5 SYRINGE (ML) INTRAVENOUS ONCE
Refills: 0 | Status: DISCONTINUED | OUTPATIENT
Start: 2019-07-11 | End: 2019-07-16

## 2019-07-11 RX ORDER — ACETAMINOPHEN 500 MG
650 TABLET ORAL EVERY 8 HOURS
Refills: 0 | Status: DISCONTINUED | OUTPATIENT
Start: 2019-07-11 | End: 2019-07-16

## 2019-07-11 RX ORDER — INSULIN LISPRO 100/ML
VIAL (ML) SUBCUTANEOUS
Refills: 0 | Status: DISCONTINUED | OUTPATIENT
Start: 2019-07-11 | End: 2019-07-16

## 2019-07-11 RX ORDER — DEXTROSE 50 % IN WATER 50 %
15 SYRINGE (ML) INTRAVENOUS ONCE
Refills: 0 | Status: DISCONTINUED | OUTPATIENT
Start: 2019-07-11 | End: 2019-07-16

## 2019-07-11 RX ORDER — SODIUM CHLORIDE 9 MG/ML
1000 INJECTION, SOLUTION INTRAVENOUS
Refills: 0 | Status: DISCONTINUED | OUTPATIENT
Start: 2019-07-11 | End: 2019-07-16

## 2019-07-11 RX ADMIN — Medication 650 MILLIGRAM(S): at 22:48

## 2019-07-11 RX ADMIN — Medication 650 MILLIGRAM(S): at 23:18

## 2019-07-11 RX ADMIN — SODIUM CHLORIDE 1000 MILLILITER(S): 9 INJECTION INTRAMUSCULAR; INTRAVENOUS; SUBCUTANEOUS at 19:23

## 2019-07-11 NOTE — ED PROVIDER NOTE - OBJECTIVE STATEMENT
87yo woman PMH DMT2, HTN, HLD, presents with lightheadedness and generalized malaise and weakness x 2-3 days. Pt reports that 87yo woman PMH DMT2, HTN, HLD, breast cancer s/p mastectomy, presents with lightheadedness and generalized malaise and weakness x 2-3 days and feeling that she cannot walk because she feels lightheaded when she gets up. Pt reports that she feels that she may faint. She denies any chest pain but has mild SOB. She denies n/v/d, no abdominal pain. She has a history of urge incontinence but denies dysuria or blood in urine. No cough. No fever. She has chronic left hip pain that bothers her.   Of note, she was found to have hgbA1C of 7, 3 weeks ago and her dose of metformin was increased from 500mg BID to 1000 BID. She also noticed some bilateral LE swelling in the past 3 days and started taking a "water pill." Her PCP prescribed her the medication to take occasionally when she has leg swelling and does not normally take it but has taken it for the past 2 days.  Pt has a history of vertigo and takes meclizine PRN but describes the sensation as different and has not taken any medications.

## 2019-07-11 NOTE — ED ADULT NURSE NOTE - OBJECTIVE STATEMENT
87 yo female A&OX3 presents to the ED with the c/o dizziness x 2 weeks progressively getting worst. As per daughter pt blood sugar has been higher than normal and pt c/o frequent urine with burning on urination. Pt denies fevers an chills, no nausea or vomiting and no headache. Pt denies decrease po intake no abd pain and no vomiting. As per daughter Metformin dosage has been changed from once a day to twice a day. Lungs clear equal b/l no cough and no sob. MAEX4

## 2019-07-11 NOTE — ED PROVIDER NOTE - NS ED ROS FT
General: no fever, +generalized malaise and weakness  Head: +lightheadedness  Eyes: +dark vision with lightheadednes  ENT: +chronic nasal drip, no neck pain  CV: no chest pain  Resp: +mild SOB, no cough  GI: no N/V/D, no abdominal pain, no blood in stool  : no dysuria, no hematuria  MSK: +chronic left hip pain  Skin: no new rash  Neuro: no focal weakness, no change in sensation

## 2019-07-11 NOTE — ED PROVIDER NOTE - ATTENDING CONTRIBUTION TO CARE
Patient presenting complaining of 2 weeks of progressive malaise, feeling like she is going to faint.  Now reporting she cannot walk secondary to weakness.  Reporting chronic, sharp, L sided chest pains, unchanged from prior, no shortness of breath, no fevers.  Having dysuria.  No abdominal pains, nausea or vomiting.  Not feeling lightheaded at rest.    A 14 point review of systems is negative except as in HPI or otherwise documented.    Exam:  General: Patient non toxic appearing, obese, vital signs within normal limits  HEENT: airway patent with moist mucous membranes  Cardiac: RRR S1/S2 with strong peripheral pulses  Respiratory: lungs clear without respiratory distress, L chest wall tender to palpation  GI: abdomen soft, non tender, non distended  Neuro: no gross neurologic deficits  Skin: warm, well perfused    Elderly woman presenting with progressive malaise, near/pre-syncope.  Multiple medical problems.  Possible etiologies include metabolic, infectious, cardiac.  Plan for labs, EKG, CXR, UA, likely admission.

## 2019-07-11 NOTE — ED PROVIDER NOTE - CLINICAL SUMMARY MEDICAL DECISION MAKING FREE TEXT BOX
89yo woman PMH DM, HTN, HLD, breast cancer s/p mastectomy presents with generalized malaise and weakness. With recent diuretic use, concern for possible dehydration vs electrolyte abnormalities. Will consider anemia vs infectious etiology of symptoms. Will keep on cardiac monitor and obtain trop, cxr, ecg to r/o acs. will give fluids and continue to monitor and reassess.

## 2019-07-11 NOTE — ED PROVIDER NOTE - CARE PLAN
Principal Discharge DX:	Ambulatory dysfunction  Secondary Diagnosis:	Malaise and fatigue  Secondary Diagnosis:	Near syncope

## 2019-07-11 NOTE — ED PROVIDER NOTE - PHYSICAL EXAMINATION
General: well-appearing elderly woman in no acute distress  Head: normocephalic, atraumatic  Eyes: PERRL  Mouth: mildly dry mucous membranes  Neck: supple neck, no Cspine tenderness to palpation  CV: normal rate and rhythm, normal S1 and S2  Respiratory: clear to auscultation bilaterally  Abdomen: soft, nontender, nondistended, tenderness to palpation of left hip  Back: no midline tenderness to palpation, no CVAT  Neuro: awake and alert and responding to questions appropriately, speech clear, moving all extremities spontaneously, ambulating with assistance  Extremities: no LE pitting edema or tenderness to palpation, peripheral pulses 2+ bilaterally

## 2019-07-12 DIAGNOSIS — Z29.9 ENCOUNTER FOR PROPHYLACTIC MEASURES, UNSPECIFIED: ICD-10-CM

## 2019-07-12 DIAGNOSIS — R55 SYNCOPE AND COLLAPSE: ICD-10-CM

## 2019-07-12 DIAGNOSIS — E11.9 TYPE 2 DIABETES MELLITUS WITHOUT COMPLICATIONS: ICD-10-CM

## 2019-07-12 DIAGNOSIS — E78.5 HYPERLIPIDEMIA, UNSPECIFIED: ICD-10-CM

## 2019-07-12 DIAGNOSIS — I10 ESSENTIAL (PRIMARY) HYPERTENSION: ICD-10-CM

## 2019-07-12 DIAGNOSIS — C50.919 MALIGNANT NEOPLASM OF UNSPECIFIED SITE OF UNSPECIFIED FEMALE BREAST: ICD-10-CM

## 2019-07-12 LAB
ANION GAP SERPL CALC-SCNC: 17 MMOL/L — SIGNIFICANT CHANGE UP (ref 5–17)
BASOPHILS # BLD AUTO: 0 K/UL — SIGNIFICANT CHANGE UP (ref 0–0.2)
BASOPHILS NFR BLD AUTO: 0.2 % — SIGNIFICANT CHANGE UP (ref 0–2)
BUN SERPL-MCNC: 16 MG/DL — SIGNIFICANT CHANGE UP (ref 7–23)
CALCIUM SERPL-MCNC: 9.7 MG/DL — SIGNIFICANT CHANGE UP (ref 8.4–10.5)
CHLORIDE SERPL-SCNC: 102 MMOL/L — SIGNIFICANT CHANGE UP (ref 96–108)
CO2 SERPL-SCNC: 24 MMOL/L — SIGNIFICANT CHANGE UP (ref 22–31)
CREAT SERPL-MCNC: 0.97 MG/DL — SIGNIFICANT CHANGE UP (ref 0.5–1.3)
EOSINOPHIL # BLD AUTO: 0.3 K/UL — SIGNIFICANT CHANGE UP (ref 0–0.5)
EOSINOPHIL NFR BLD AUTO: 4 % — SIGNIFICANT CHANGE UP (ref 0–6)
GLUCOSE SERPL-MCNC: 182 MG/DL — HIGH (ref 70–99)
HBA1C BLD-MCNC: 7.9 % — HIGH (ref 4–5.6)
HCT VFR BLD CALC: 42 % — SIGNIFICANT CHANGE UP (ref 34.5–45)
HGB BLD-MCNC: 14.3 G/DL — SIGNIFICANT CHANGE UP (ref 11.5–15.5)
LYMPHOCYTES # BLD AUTO: 2.7 K/UL — SIGNIFICANT CHANGE UP (ref 1–3.3)
LYMPHOCYTES # BLD AUTO: 32.8 % — SIGNIFICANT CHANGE UP (ref 13–44)
MCHC RBC-ENTMCNC: 29.3 PG — SIGNIFICANT CHANGE UP (ref 27–34)
MCHC RBC-ENTMCNC: 34 GM/DL — SIGNIFICANT CHANGE UP (ref 32–36)
MCV RBC AUTO: 86.3 FL — SIGNIFICANT CHANGE UP (ref 80–100)
MONOCYTES # BLD AUTO: 0.6 K/UL — SIGNIFICANT CHANGE UP (ref 0–0.9)
MONOCYTES NFR BLD AUTO: 7.2 % — SIGNIFICANT CHANGE UP (ref 2–14)
NEUTROPHILS # BLD AUTO: 4.6 K/UL — SIGNIFICANT CHANGE UP (ref 1.8–7.4)
NEUTROPHILS NFR BLD AUTO: 55.7 % — SIGNIFICANT CHANGE UP (ref 43–77)
PLATELET # BLD AUTO: 336 K/UL — SIGNIFICANT CHANGE UP (ref 150–400)
POTASSIUM SERPL-MCNC: 4.6 MMOL/L — SIGNIFICANT CHANGE UP (ref 3.5–5.3)
POTASSIUM SERPL-SCNC: 4.6 MMOL/L — SIGNIFICANT CHANGE UP (ref 3.5–5.3)
RBC # BLD: 4.87 M/UL — SIGNIFICANT CHANGE UP (ref 3.8–5.2)
RBC # FLD: 12.3 % — SIGNIFICANT CHANGE UP (ref 10.3–14.5)
SODIUM SERPL-SCNC: 143 MMOL/L — SIGNIFICANT CHANGE UP (ref 135–145)
WBC # BLD: 8.2 K/UL — SIGNIFICANT CHANGE UP (ref 3.8–10.5)
WBC # FLD AUTO: 8.2 K/UL — SIGNIFICANT CHANGE UP (ref 3.8–10.5)

## 2019-07-12 PROCEDURE — 99223 1ST HOSP IP/OBS HIGH 75: CPT

## 2019-07-12 PROCEDURE — 12345: CPT | Mod: NC

## 2019-07-12 PROCEDURE — 93880 EXTRACRANIAL BILAT STUDY: CPT | Mod: 26

## 2019-07-12 RX ORDER — LIDOCAINE 4 G/100G
1 CREAM TOPICAL DAILY
Refills: 0 | Status: DISCONTINUED | OUTPATIENT
Start: 2019-07-12 | End: 2019-07-15

## 2019-07-12 RX ORDER — CEFTRIAXONE 500 MG/1
1000 INJECTION, POWDER, FOR SOLUTION INTRAMUSCULAR; INTRAVENOUS EVERY 24 HOURS
Refills: 0 | Status: COMPLETED | OUTPATIENT
Start: 2019-07-12 | End: 2019-07-14

## 2019-07-12 RX ORDER — METFORMIN HYDROCHLORIDE 850 MG/1
1 TABLET ORAL
Qty: 0 | Refills: 0 | DISCHARGE

## 2019-07-12 RX ORDER — METFORMIN HYDROCHLORIDE 850 MG/1
2 TABLET ORAL
Qty: 0 | Refills: 0 | DISCHARGE

## 2019-07-12 RX ORDER — LIDOCAINE 4 G/100G
1 CREAM TOPICAL
Qty: 0 | Refills: 0 | DISCHARGE

## 2019-07-12 RX ORDER — LISINOPRIL 2.5 MG/1
5 TABLET ORAL DAILY
Refills: 0 | Status: DISCONTINUED | OUTPATIENT
Start: 2019-07-12 | End: 2019-07-16

## 2019-07-12 RX ORDER — FAMOTIDINE 10 MG/ML
20 INJECTION INTRAVENOUS DAILY
Refills: 0 | Status: DISCONTINUED | OUTPATIENT
Start: 2019-07-12 | End: 2019-07-16

## 2019-07-12 RX ORDER — HEPARIN SODIUM 5000 [USP'U]/ML
5000 INJECTION INTRAVENOUS; SUBCUTANEOUS EVERY 8 HOURS
Refills: 0 | Status: DISCONTINUED | OUTPATIENT
Start: 2019-07-12 | End: 2019-07-16

## 2019-07-12 RX ORDER — MECLIZINE HCL 12.5 MG
12.5 TABLET ORAL EVERY 6 HOURS
Refills: 0 | Status: DISCONTINUED | OUTPATIENT
Start: 2019-07-12 | End: 2019-07-16

## 2019-07-12 RX ORDER — SIMVASTATIN 20 MG/1
20 TABLET, FILM COATED ORAL AT BEDTIME
Refills: 0 | Status: DISCONTINUED | OUTPATIENT
Start: 2019-07-12 | End: 2019-07-16

## 2019-07-12 RX ORDER — CEFTRIAXONE 500 MG/1
1 INJECTION, POWDER, FOR SOLUTION INTRAMUSCULAR; INTRAVENOUS EVERY 24 HOURS
Refills: 0 | Status: DISCONTINUED | OUTPATIENT
Start: 2019-07-12 | End: 2019-07-12

## 2019-07-12 RX ORDER — AMLODIPINE BESYLATE 2.5 MG/1
5 TABLET ORAL DAILY
Refills: 0 | Status: DISCONTINUED | OUTPATIENT
Start: 2019-07-12 | End: 2019-07-16

## 2019-07-12 RX ORDER — FAMOTIDINE 10 MG/ML
1 INJECTION INTRAVENOUS
Qty: 0 | Refills: 0 | DISCHARGE

## 2019-07-12 RX ADMIN — CEFTRIAXONE 100 MILLIGRAM(S): 500 INJECTION, POWDER, FOR SOLUTION INTRAMUSCULAR; INTRAVENOUS at 14:53

## 2019-07-12 RX ADMIN — FAMOTIDINE 20 MILLIGRAM(S): 10 INJECTION INTRAVENOUS at 13:32

## 2019-07-12 RX ADMIN — HEPARIN SODIUM 5000 UNIT(S): 5000 INJECTION INTRAVENOUS; SUBCUTANEOUS at 21:57

## 2019-07-12 RX ADMIN — HEPARIN SODIUM 5000 UNIT(S): 5000 INJECTION INTRAVENOUS; SUBCUTANEOUS at 05:31

## 2019-07-12 RX ADMIN — LIDOCAINE 1 PATCH: 4 CREAM TOPICAL at 13:32

## 2019-07-12 RX ADMIN — SIMVASTATIN 20 MILLIGRAM(S): 20 TABLET, FILM COATED ORAL at 21:56

## 2019-07-12 RX ADMIN — Medication 1: at 09:18

## 2019-07-12 RX ADMIN — HEPARIN SODIUM 5000 UNIT(S): 5000 INJECTION INTRAVENOUS; SUBCUTANEOUS at 13:32

## 2019-07-12 RX ADMIN — LISINOPRIL 5 MILLIGRAM(S): 2.5 TABLET ORAL at 05:31

## 2019-07-12 RX ADMIN — Medication 1: at 13:33

## 2019-07-12 RX ADMIN — Medication 1: at 18:14

## 2019-07-12 RX ADMIN — AMLODIPINE BESYLATE 5 MILLIGRAM(S): 2.5 TABLET ORAL at 05:31

## 2019-07-12 NOTE — PATIENT PROFILE ADULT - FUNCTIONAL SCREEN CURRENT LEVEL: EATING, MLM
Spoke to pt and she will go to [facility] to have her labs done. Instructed pt to fast for 12 hours. 2 = assistive person

## 2019-07-12 NOTE — PATIENT PROFILE ADULT - FUNCTIONAL SCREEN CURRENT LEVEL: DRESSING, MLM
"Subjective:      History was provided by the patient and grandmother and patient was brought in for med check  .    History of Present Illness:    Facundo Neves is a 10 y.o. here today for a medcheck. Last med check was on 5/18/17 by me.   There are concerns about sleep, also self-esteem, school.     Patient was initially diagnosed with ADD/ADHD 6/24/13 by Dr. Lyle (no longer in practice).   Formal testing done?              Yes   Followed by psychology?       No   Followed by psychiatry?         No      Current medication(s): Pyschostimulants:  Concerta 27 mg, Periactin 2 mg BID. Has been on current medication and dosage since 5/2017.  Changed from Adderall possibly due to appetite, problems coming off of med.  Past treatments: Daytrana 10-15, Vyvanse - bad dry mouth, poor appetite, bad coming off of med, Tenex - hypotension/somnolence, Adderall XR 10/15 mg, Strattera required PA.      He is in 4th grade, home schooled by [employer] currently, she does not feel he is at 4th grade level.  Returned from [place] in August to start school.  Vision therapy (Dr. Grier) currently not covered, going to see eye doctor at [facility] (Dr. Brunson).    Was also recommended to see chiropractor for "Brain Sunday" therapy -  has questions about this, $5200.     Symptoms:   inattention, hyperactivity, impulsivity   Additional symptoms: school difficulties - problems with visual/reading instructions, much better with verbal instructions     Benefits:   Is there a decrease in ADHD symptoms on medication? Yes  Does patient take medication daily?    Yes   Does medication last through homework?   Yes      Side effects:   Appetite loss  Better on Concerta, mostly just eats dinner  Weight loss  No, but little gain since May  Insomnia  Not sleeping well, up at 2am asking for ways to get to sleep, 3mg melatonin prn  Headache  Periactin to BID, occ needs Imitrex   Abdominal pain No   Tics   No   Emotional lability when wearing " "off  Other   No       Past Medical History:   Diagnosis Date    ADHD (attention deficit hyperactivity disorder) 6/24/13    eval by Kiana Lyle - no longer seeing, Vyvanse/Daytrana, Tenex    Adjustment disorder with mixed disturbance of emotions and conduct     Episode of syncope 4/16/12    seen in ED, ? LOC    Heart murmur 4/13/2012    Normal ECG, ECHO     Hypotension due to drugs 7/24/2015    Tenex     Intractable migraine without aura and without status migrainosus 11/4/2015    Dr. Bustillos, on Periactin, MRI normal 11/2015     Migraine 11/2015    Dr. Bustillos (Peds Neuro), MRI normal, Periactin 2mg BID    Other family disruption     mom's illness, autoimmune, hysterectomy, dialysis, spends part of time with GM    Otitis media     Streptococcal pharyngitis 5/19/2014    Vision disorder     weekly therapy for "dead eye" at [facility], also wears glasses           Past Surgical History:   Procedure Laterality Date    CIRCUMCISION      TONSILLECTOMY, ADENOIDECTOMY  6/12/14    Dr. Wyman           Family History   Problem Relation Age of Onset    Endometriosis Mother     Autoimmune disease Mother     Headaches Mother     Mitral valve prolapse Father     Hypertension Father     Hyperlipidemia Father     Hypoglycemic Paternal Grandmother     Cancer Paternal Grandfather      benign brain tumor       Review of Systems   Constitutional: Negative for activity change, appetite change, fever and unexpected weight change.   Cardiovascular: Negative for chest pain and palpitations.   Gastrointestinal: Negative for abdominal pain.   Neurological: Negative for headaches.   Psychiatric/Behavioral: Positive for decreased concentration. Negative for behavioral problems, dysphoric mood and sleep disturbance. The patient is hyperactive. The patient is not nervous/anxious.            Objective:     Physical Exam   Constitutional: Vital signs are normal. He appears well-developed and " "well-nourished. He is cooperative. No distress.   HENT:   Mouth/Throat: Mucous membranes are moist. Oropharynx is clear.   Eyes: Conjunctivae and EOM are normal. Pupils are equal, round, and reactive to light.   Neck: Neck supple.   Cardiovascular: Normal rate and regular rhythm.    No murmur heard.  Pulmonary/Chest: Effort normal and breath sounds normal.   Neurological: He is alert and oriented for age.   Psychiatric: He has a normal mood and affect. His speech is normal and behavior is normal. Thought content normal.       Assessment:        1. ADHD (attention deficit hyperactivity disorder), combined type    2. Sleep disorder    3. Family disruption due to extended absence of family member         Plan:     Plan:    Current medication:  continue Concerta 36mg (written Rx's x 3)    Additional medications today:  Ok to increase melatonin from 3 to 5 mg, discontinue if not helping    RTC in 3 months.      Patient Instructions   Call to make appointment with [facility]:  [phone].    Recommend psychology to evaluate for disorders aside from ADHD.  Also to help with self-confidence, and any stress being away from family.  Psychology/psychiatry to help with sleep disorder.     Can try up to 5 mg of melatonin.        Given list of contacts for psychology/psychiatry.  Discussed "Brain Sunday" therapy by chiropractor.  This apparently involves using EEG to map out areas of the brain causing the problem and use to target treatment.  I cautioned that I do not feel an EEG can be used in this way, and that I also do not feel this is within the scope of a chiropractor.  I recommend discussion with patient's Neurologist (seen for migraines).   Also get recommendations from new eye doctor.  But I do feel he will benefit most from seeing psychology/psychiatry.      Time spent:  45 minutes, > 50% of time spent in discussion.    " 2 = assistive person

## 2019-07-12 NOTE — H&P ADULT - PROBLEM SELECTOR PLAN 2
Will hold oral hypoglycemic agents.  Start premeal and qhs fingerstick blood sugar checks.   Will start lispro premeal and qhs sliding scale, and adjust insulin dosing based on glycemic requirements.

## 2019-07-12 NOTE — H&P ADULT - PROBLEM SELECTOR PLAN 1
Lightheadedness and near syncope may be related to orthostatic hypotension vs potential carotid disease. Pt has hx of radiation therapy for breast cancer with positive lymph nodes.   Hyperglycemia is less likely to cause her symptoms.  Check orthostatic BP.  Check duplex carotid US.   Patient denies vertiginous dizziness- will hold meclizine for now.   [facility] Reference #: [id number] - Chlorazepate 7.5mg tablet, 30 day supply, quantity 90. Prescription was last filled 4/24/2019. Will restart at this time, because patient's daughter expressed that the patient was still taking the medication- Day team will need to confirm with pharmacy in AM.

## 2019-07-12 NOTE — H&P ADULT - ASSESSMENT
This patient is an 88yoF with PMH of breast cancer (s/p partial mastectomy, lymph node resection, radiation and chemotherapy), T2DM, htn, hLd, PE, hx of TB who presents to the ED with complaint of lightheadedness and dizziness.

## 2019-07-12 NOTE — H&P ADULT - NSHPPHYSICALEXAM_GEN_ALL_CORE
Vital Signs Last 24 Hrs  T(C): 36.4 (11 Jul 2019 22:54), Max: 36.8 (11 Jul 2019 14:45)  T(F): 97.5 (11 Jul 2019 22:54), Max: 98.2 (11 Jul 2019 14:45)  HR: 78 (11 Jul 2019 22:54) (76 - 78)  BP: 181/78 (11 Jul 2019 22:54) (128/71 - 181/78)  BP(mean): --  RR: 17 (11 Jul 2019 22:54) (17 - 18)  SpO2: 97% (11 Jul 2019 22:54) (95% - 97%)    PHYSICAL EXAM:  GENERAL: NAD, well-groomed, well-developed, lying comfortably in bed  HEAD:  Atraumatic, Normocephalic  EYES: EOMI, PERRLA, conjunctiva and sclera clear  ENMT: No oropharyngeal exudates, erythema or lesions, Moist mucous membranes, good dentition  NECK: Supple, no cervical lymphadenopathy,   NERVOUS SYSTEM:  Alert & Oriented X3, CN II-XII intact, 5/5 BUE and BLE motor strength, decreased sensation to light touch at fingertips and toes  CHEST/LUNG: Clear to auscultation bilaterally; No rales, no  rhonchi, no wheezing  Chest wall tender to palpation  HEART: Regular rate and rhythm; systolic murmur at sternal border  ABDOMEN: Soft, Nontender, Nondistended  EXTREMITIES:  2+ Peripheral Pulses, No clubbing, cyanosis, or pedal edema  PSYCH: calm, logical thought process   SKIN: mild erythema at neck

## 2019-07-12 NOTE — H&P ADULT - NSICDXPASTMEDICALHX_GEN_ALL_CORE_FT
PAST MEDICAL HISTORY:  Breast cancer     Diabetes type II    Hyperlipidemia     Hypertension     Metastatic carcinoma     Pulmonary embolism 2012    Tuberculosis 1956

## 2019-07-12 NOTE — H&P ADULT - NSHPREVIEWOFSYSTEMS_GEN_ALL_CORE
REVIEW OF SYSTEMS  CONSTITUTIONAL: No fever, no chills  EYES: No eye pain, + blurry vision   ENMT:  No difficulty hearing, + chronic throat pain related to radiation therapy  RESPIRATORY: No cough, no wheezing, no sputum production; No shortness of breath  CARDIOVASCULAR: + chest wall pain from previous surgery, no chest pressure, no palpitations, + leg swelling now resolved  GASTROINTESTINAL: No abdominal pain, no nausea, no vomiting, no diarrhea, no constipation, no melena, no hematochezia.  GENITOURINARY: No dysuria, no hematuria, + dysuria  NEUROLOGICAL: No headaches, no loss of strength, + numbness of digits related to chemotherapy , + lightheadedness  SKIN: No itching, no rashes, no lesions   MUSCULOSKELETAL: No joint pain, no joint swelling; No muscle pain  HEME/LYMPH: No easy bruising, bleeding

## 2019-07-12 NOTE — H&P ADULT - NSHPLABSRESULTS_GEN_ALL_CORE
Labs, imaging and EKG personally reviewed by me.     LABS:                        13.0   9.0   )-----------( 293      ( 2019 16:51 )             37.6     Hgb Trend: 13.0<--  11    x   |  x   |  x   ----------------------------<  x   4.2   |  x   |  x     Ca    9.0      2019 16:51  Phos  4.2       Mg     1.9         TPro  7.2  /  Alb  4.1  /  TBili  0.3  /  DBili  x   /  AST  27  /  ALT  14  /  AlkPhos  59      Creatinine Trend: 0.92<--    Urinalysis Basic - ( 2019 16:51 )    Color: Light Yellow / Appearance: Clear / S.015 / pH: x  Gluc: x / Ketone: Negative  / Bili: Negative / Urobili: Negative   Blood: x / Protein: Negative / Nitrite: Negative   Leuk Esterase: Large / RBC: 1 /hpf / WBC 10 /HPF   Sq Epi: x / Non Sq Epi: 1 /hpf / Bacteria: Negative    Venous Blood Gas:   @ 17:43  7.36/46/38/26/69  VBG Lactate: 1.9      PROCEDURE DATE:  2019    ******PRELIMINARY REPORT******        INTERPRETATION:  Clear lungs    < from: CT Head No Cont (19 @ 20:07) >    FINDINGS:   The basal cisterns are patent and there is no midline shift or other   evidence of acute mass effect.    There is no acute intracranial hemorrhage or abnormal extra-axial fluid   collection. No CT evidence of acute transcortical infarct is appreciated.    The ventricles and sulci are normal in configuration and their size   commensurate with moderateage-related cerebral atrophy.    There is nonspecific periventricular and deep white matter tract   hypoattenuation compatible with mild chronic microvascular ischemic   disease. Punctate hypodense foci in the right caudate are compatible with   chronic lacunar infarcts.    There is no depressed slight depressed calvarial fracture or abnormal   opacification of the mastoid cells. The tympanomastoid cavities are   grossly clear. There is trace sinus mucosal thickening of the ethmoid   cells.    IMPRESSION:   No acute intracranial hemorrhage, midline shift or mass effect.     < end of copied text > Labs, imaging and EKG personally reviewed by me.   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 AVB, LAD, LBBB sinus    LABS:                        13.0   9.0   )-----------( 293      ( 2019 16:51 )             37.6     Hgb Trend: 13.0<--  11    x   |  x   |  x   ----------------------------<  x   4.2   |  x   |  x     Ca    9.0      2019 16:51  Phos  4.2       Mg     1.9         TPro  7.2  /  Alb  4.1  /  TBili  0.3  /  DBili  x   /  AST  27  /  ALT  14  /  AlkPhos  59      Creatinine Trend: 0.92<--    Urinalysis Basic - ( 2019 16:51 )    Color: Light Yellow / Appearance: Clear / S.015 / pH: x  Gluc: x / Ketone: Negative  / Bili: Negative / Urobili: Negative   Blood: x / Protein: Negative / Nitrite: Negative   Leuk Esterase: Large / RBC: 1 /hpf / WBC 10 /HPF   Sq Epi: x / Non Sq Epi: 1 /hpf / Bacteria: Negative    Venous Blood Gas:   @ 17:43  7.36/46/38//69  VBG Lactate: 1.9      PROCEDURE DATE:  2019    ******PRELIMINARY REPORT******        INTERPRETATION:  Clear lungs    < from: CT Head No Cont (19 @ 20:07) >    FINDINGS:   The basal cisterns are patent and there is no midline shift or other   evidence of acute mass effect.    There is no acute intracranial hemorrhage or abnormal extra-axial fluid   collection. No CT evidence of acute transcortical infarct is appreciated.    The ventricles and sulci are normal in configuration and their size   commensurate with moderateage-related cerebral atrophy.    There is nonspecific periventricular and deep white matter tract   hypoattenuation compatible with mild chronic microvascular ischemic   disease. Punctate hypodense foci in the right caudate are compatible with   chronic lacunar infarcts.    There is no depressed slight depressed calvarial fracture or abnormal   opacification of the mastoid cells. The tympanomastoid cavities are   grossly clear. There is trace sinus mucosal thickening of the ethmoid   cells.    IMPRESSION:   No acute intracranial hemorrhage, midline shift or mass effect.     < end of copied text >

## 2019-07-12 NOTE — H&P ADULT - HISTORY OF PRESENT ILLNESS
This patient is an 88yoF with PMH of breast cancer (s/p partial mastectomy, lymph node resection, radiation and chemotherapy), T2DM, htn, hLd, PE, hx of TB who presents to the ED with complaint of lightheadedness and dizziness. Patient complains that she has felt lightheadedness over the last 2 week, which is brought on by sitting in one position for too long, walking or getting up from a seated position. When lightheadedness occurs, the patient has to lie down until it passes. She has taken antivert at home without symptomatic improvement. Pt denies headaches, CP, palpitations.  Pt has lower extremity edema and was recently prescribed a "water pill" which helped with her edema. She states that edema improved. She denies association of water pill with lightheadedness.   Pt had noted increase in fingerstick BS from 130s-140s to 180s- 265. Patient associates blurry vision and "head heaviness" with elevated glucose. She has increased her dose of metformin recently. Pt endorses dysuria.     In the ED, T 98.2F, HR 76, /71, RR 18, SpO2 95%RA   Patient was given 1L NS.

## 2019-07-12 NOTE — H&P ADULT - NSHPSOCIALHISTORY_GEN_ALL_CORE
The patient denies tobacco use or alcohol use.  She lives with her daughter, Christa.  She ambulates with a walker or uses a wheelchair.  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

## 2019-07-12 NOTE — CHART NOTE - NSCHARTNOTEFT_GEN_A_CORE
Patient seen and examined. She continues feel a little lightheaded. Orthostatics negative, UA positive and patient does admit to pain and burning with urination for the last 3-4 days. Start ceftriaxone 1 gm daily. Carotid US pending. Resume meclizine. PT consult pending.

## 2019-07-12 NOTE — H&P ADULT - NSICDXFAMILYHX_GEN_ALL_CORE_FT
FAMILY HISTORY:  Family history of active pulmonary tuberculosis in patient's mother    Child  Still living? Unknown  Family history of breast cancer, Age at diagnosis: Age Unknown

## 2019-07-13 DIAGNOSIS — N30.00 ACUTE CYSTITIS WITHOUT HEMATURIA: ICD-10-CM

## 2019-07-13 LAB
ANION GAP SERPL CALC-SCNC: 14 MMOL/L — SIGNIFICANT CHANGE UP (ref 5–17)
BUN SERPL-MCNC: 18 MG/DL — SIGNIFICANT CHANGE UP (ref 7–23)
CALCIUM SERPL-MCNC: 8.7 MG/DL — SIGNIFICANT CHANGE UP (ref 8.4–10.5)
CHLORIDE SERPL-SCNC: 103 MMOL/L — SIGNIFICANT CHANGE UP (ref 96–108)
CO2 SERPL-SCNC: 23 MMOL/L — SIGNIFICANT CHANGE UP (ref 22–31)
CREAT SERPL-MCNC: 0.74 MG/DL — SIGNIFICANT CHANGE UP (ref 0.5–1.3)
GLUCOSE SERPL-MCNC: 223 MG/DL — HIGH (ref 70–99)
HCT VFR BLD CALC: 38.4 % — SIGNIFICANT CHANGE UP (ref 34.5–45)
HGB BLD-MCNC: 12.9 G/DL — SIGNIFICANT CHANGE UP (ref 11.5–15.5)
MAGNESIUM SERPL-MCNC: 1.7 MG/DL — SIGNIFICANT CHANGE UP (ref 1.6–2.6)
MCHC RBC-ENTMCNC: 28.9 PG — SIGNIFICANT CHANGE UP (ref 27–34)
MCHC RBC-ENTMCNC: 33.6 GM/DL — SIGNIFICANT CHANGE UP (ref 32–36)
MCV RBC AUTO: 86.2 FL — SIGNIFICANT CHANGE UP (ref 80–100)
PHOSPHATE SERPL-MCNC: 3.2 MG/DL — SIGNIFICANT CHANGE UP (ref 2.5–4.5)
PLATELET # BLD AUTO: 293 K/UL — SIGNIFICANT CHANGE UP (ref 150–400)
POTASSIUM SERPL-MCNC: 4.4 MMOL/L — SIGNIFICANT CHANGE UP (ref 3.5–5.3)
POTASSIUM SERPL-SCNC: 4.4 MMOL/L — SIGNIFICANT CHANGE UP (ref 3.5–5.3)
RBC # BLD: 4.45 M/UL — SIGNIFICANT CHANGE UP (ref 3.8–5.2)
RBC # FLD: 12.2 % — SIGNIFICANT CHANGE UP (ref 10.3–14.5)
SODIUM SERPL-SCNC: 140 MMOL/L — SIGNIFICANT CHANGE UP (ref 135–145)
WBC # BLD: 7.8 K/UL — SIGNIFICANT CHANGE UP (ref 3.8–10.5)
WBC # FLD AUTO: 7.8 K/UL — SIGNIFICANT CHANGE UP (ref 3.8–10.5)

## 2019-07-13 PROCEDURE — 93010 ELECTROCARDIOGRAM REPORT: CPT

## 2019-07-13 PROCEDURE — 99233 SBSQ HOSP IP/OBS HIGH 50: CPT

## 2019-07-13 RX ADMIN — HEPARIN SODIUM 5000 UNIT(S): 5000 INJECTION INTRAVENOUS; SUBCUTANEOUS at 13:27

## 2019-07-13 RX ADMIN — LISINOPRIL 5 MILLIGRAM(S): 2.5 TABLET ORAL at 05:23

## 2019-07-13 RX ADMIN — HEPARIN SODIUM 5000 UNIT(S): 5000 INJECTION INTRAVENOUS; SUBCUTANEOUS at 21:17

## 2019-07-13 RX ADMIN — FAMOTIDINE 20 MILLIGRAM(S): 10 INJECTION INTRAVENOUS at 12:09

## 2019-07-13 RX ADMIN — Medication 1: at 16:49

## 2019-07-13 RX ADMIN — SIMVASTATIN 20 MILLIGRAM(S): 20 TABLET, FILM COATED ORAL at 21:17

## 2019-07-13 RX ADMIN — LIDOCAINE 1 PATCH: 4 CREAM TOPICAL at 12:08

## 2019-07-13 RX ADMIN — Medication 650 MILLIGRAM(S): at 21:17

## 2019-07-13 RX ADMIN — LIDOCAINE 1 PATCH: 4 CREAM TOPICAL at 01:22

## 2019-07-13 RX ADMIN — AMLODIPINE BESYLATE 5 MILLIGRAM(S): 2.5 TABLET ORAL at 05:23

## 2019-07-13 RX ADMIN — CEFTRIAXONE 100 MILLIGRAM(S): 500 INJECTION, POWDER, FOR SOLUTION INTRAMUSCULAR; INTRAVENOUS at 13:26

## 2019-07-13 RX ADMIN — Medication 1: at 08:44

## 2019-07-13 RX ADMIN — LIDOCAINE 1 PATCH: 4 CREAM TOPICAL at 20:59

## 2019-07-13 RX ADMIN — LIDOCAINE 1 PATCH: 4 CREAM TOPICAL at 01:24

## 2019-07-13 RX ADMIN — Medication 650 MILLIGRAM(S): at 23:00

## 2019-07-13 RX ADMIN — HEPARIN SODIUM 5000 UNIT(S): 5000 INJECTION INTRAVENOUS; SUBCUTANEOUS at 05:23

## 2019-07-13 NOTE — PROGRESS NOTE ADULT - PROBLEM SELECTOR PLAN 3
Patient reports that she is in remission. She is due to follow up with her oncologist. Patient reports that she is in remission.  - c/w f/up with primary oncologist.

## 2019-07-13 NOTE — PHYSICAL THERAPY INITIAL EVALUATION ADULT - DIAGNOSIS, PT EVAL
Decr. functional mobility 2/2 decr. endurance, L UE strength (baseline due to h/o L mastectomy), dynamic balance; general deconditioning

## 2019-07-13 NOTE — PHYSICAL THERAPY INITIAL EVALUATION ADULT - ADDITIONAL COMMENTS
Pt lives with daughter and 21 yo grandson in private home with no stairs to enter, 12 stairs to bedroom (+) HR (pt reports she has a chairlift but it is broken). Pt owns rolling walker, shower chair, commode, transport chair. Pt has [facility], 7 hrs/day.

## 2019-07-13 NOTE — PROGRESS NOTE ADULT - SUBJECTIVE AND OBJECTIVE BOX
Patient is a 88y old  Female who presents with a chief complaint of dizziness and lightheadedness (2019 00:02)      SUBJECTIVE / OVERNIGHT EVENTS:    MEDICATIONS  (STANDING):  amLODIPine   Tablet 5 milliGRAM(s) Oral daily  cefTRIAXone   IVPB 1000 milliGRAM(s) IV Intermittent every 24 hours  dextrose 5%. 1000 milliLiter(s) (50 mL/Hr) IV Continuous <Continuous>  dextrose 50% Injectable 12.5 Gram(s) IV Push once  dextrose 50% Injectable 25 Gram(s) IV Push once  dextrose 50% Injectable 25 Gram(s) IV Push once  famotidine    Tablet 20 milliGRAM(s) Oral daily  heparin  Injectable 5000 Unit(s) SubCutaneous every 8 hours  insulin lispro (HumaLOG) corrective regimen sliding scale   SubCutaneous three times a day before meals  insulin lispro (HumaLOG) corrective regimen sliding scale   SubCutaneous at bedtime  lidocaine   Patch 1 Patch Transdermal daily  lisinopril 5 milliGRAM(s) Oral daily  simvastatin 20 milliGRAM(s) Oral at bedtime    MEDICATIONS  (PRN):  acetaminophen   Tablet .. 650 milliGRAM(s) Oral every 8 hours PRN Mild Pain (1 - 3), Moderate Pain (4 - 6)  dextrose 40% Gel 15 Gram(s) Oral once PRN Blood Glucose LESS THAN 70 milliGRAM(s)/deciliter  glucagon  Injectable 1 milliGRAM(s) IntraMuscular once PRN Glucose LESS THAN 70 milligrams/deciliter  meclizine 12.5 milliGRAM(s) Oral every 6 hours PRN Dizziness      Vital Signs Last 24 Hrs  T(C): 37.1 (2019 09:22), Max: 37.1 (2019 09:22)  T(F): 98.8 (2019 09:22), Max: 98.8 (2019 09:22)  HR: 76 (2019 09:22) (63 - 76)  BP: 155/70 (2019 09:22) (121/69 - 155/70)  BP(mean): --  RR: 16 (2019 09:22) (16 - 18)  SpO2: 94% (2019 09:22) (94% - 98%)  CAPILLARY BLOOD GLUCOSE      POCT Blood Glucose.: 151 mg/dL (2019 08:27)  POCT Blood Glucose.: 140 mg/dL (2019 21:43)  POCT Blood Glucose.: 182 mg/dL (2019 18:03)  POCT Blood Glucose.: 164 mg/dL (2019 13:26)    I&O's Summary    2019 07:01  -  2019 07:00  --------------------------------------------------------  IN: 240 mL / OUT: 0 mL / NET: 240 mL        PHYSICAL EXAM:  GENERAL: NAD, well-developed  HEAD:  Atraumatic, Normocephalic  EYES: EOMI, PERRLA, conjunctiva and sclera clear  NECK: Supple, No JVD  CHEST/LUNG: Clear to auscultation bilaterally; No wheeze  HEART: Regular rate and rhythm; No murmurs, rubs, or gallops  ABDOMEN: Soft, Nontender, Nondistended; Bowel sounds present  EXTREMITIES:  2+ Peripheral Pulses, No clubbing, cyanosis, or edema  PSYCH: AAOx3  NEUROLOGY: non-focal  SKIN: No rashes or lesions    LABS:                        14.3   8.2   )-----------( 336      ( 2019 18:46 )             42.0     07-12    143  |  102  |  16  ----------------------------<  182<H>  4.6   |  24  |  0.97    Ca    9.7      2019 18:46  Phos  4.2     07-11  Mg     1.9         TPro  7.2  /  Alb  4.1  /  TBili  0.3  /  DBili  x   /  AST  27  /  ALT  14  /  AlkPhos  59  07-11          Urinalysis Basic - ( 2019 16:51 )    Color: Light Yellow / Appearance: Clear / S.015 / pH: x  Gluc: x / Ketone: Negative  / Bili: Negative / Urobili: Negative   Blood: x / Protein: Negative / Nitrite: Negative   Leuk Esterase: Large / RBC: 1 /hpf / WBC 10 /HPF   Sq Epi: x / Non Sq Epi: 1 /hpf / Bacteria: Negative        RADIOLOGY & ADDITIONAL TESTS:    Imaging Personally Reviewed:    Consultant(s) Notes Reviewed:      Care Discussed with Consultants/Other Providers: Patient is a 88y old  Female who presents with a chief complaint of dizziness and lightheadedness (2019 00:02)      SUBJECTIVE / OVERNIGHT EVENTS: Pt seen and examined. No acute events overnight. Reports improvement in dizziness.    MEDICATIONS  (STANDING):  amLODIPine   Tablet 5 milliGRAM(s) Oral daily  cefTRIAXone   IVPB 1000 milliGRAM(s) IV Intermittent every 24 hours  dextrose 5%. 1000 milliLiter(s) (50 mL/Hr) IV Continuous <Continuous>  dextrose 50% Injectable 12.5 Gram(s) IV Push once  dextrose 50% Injectable 25 Gram(s) IV Push once  dextrose 50% Injectable 25 Gram(s) IV Push once  famotidine    Tablet 20 milliGRAM(s) Oral daily  heparin  Injectable 5000 Unit(s) SubCutaneous every 8 hours  insulin lispro (HumaLOG) corrective regimen sliding scale   SubCutaneous three times a day before meals  insulin lispro (HumaLOG) corrective regimen sliding scale   SubCutaneous at bedtime  lidocaine   Patch 1 Patch Transdermal daily  lisinopril 5 milliGRAM(s) Oral daily  simvastatin 20 milliGRAM(s) Oral at bedtime    MEDICATIONS  (PRN):  acetaminophen   Tablet .. 650 milliGRAM(s) Oral every 8 hours PRN Mild Pain (1 - 3), Moderate Pain (4 - 6)  dextrose 40% Gel 15 Gram(s) Oral once PRN Blood Glucose LESS THAN 70 milliGRAM(s)/deciliter  glucagon  Injectable 1 milliGRAM(s) IntraMuscular once PRN Glucose LESS THAN 70 milligrams/deciliter  meclizine 12.5 milliGRAM(s) Oral every 6 hours PRN Dizziness      Vital Signs Last 24 Hrs  T(C): 37.1 (2019 09:22), Max: 37.1 (2019 09:22)  T(F): 98.8 (2019 09:22), Max: 98.8 (2019 09:22)  HR: 76 (2019 09:22) (63 - 76)  BP: 155/70 (2019 09:22) (121/69 - 155/70)  BP(mean): --  RR: 16 (2019 09:22) (16 - 18)  SpO2: 94% (2019 09:22) (94% - 98%)  CAPILLARY BLOOD GLUCOSE      POCT Blood Glucose.: 151 mg/dL (2019 08:27)  POCT Blood Glucose.: 140 mg/dL (2019 21:43)  POCT Blood Glucose.: 182 mg/dL (2019 18:03)  POCT Blood Glucose.: 164 mg/dL (2019 13:26)    I&O's Summary    2019 07:01  -  2019 07:00  --------------------------------------------------------  IN: 240 mL / OUT: 0 mL / NET: 240 mL        PHYSICAL EXAM:  GENERAL: NAD, well-developed  HEAD:  Atraumatic, Normocephalic  EYES: EOMI, PERRLA, conjunctiva and sclera clear  NECK: Supple, No JVD  CHEST/LUNG: Clear to auscultation bilaterally; No wheeze  HEART: Regular rate and rhythm; No murmurs, rubs, or gallops  ABDOMEN: Soft, Nontender, Nondistended; Bowel sounds present  EXTREMITIES:  2+ Peripheral Pulses, No clubbing, cyanosis, or edema  PSYCH: AAOx3  NEUROLOGY: non-focal  SKIN: No rashes or lesions    LABS:                        14.3   8.2   )-----------( 336      ( 2019 18:46 )             42.0     07-12    143  |  102  |  16  ----------------------------<  182<H>  4.6   |  24  |  0.97    Ca    9.7      2019 18:46  Phos  4.2     -  Mg     1.9         TPro  7.2  /  Alb  4.1  /  TBili  0.3  /  DBili  x   /  AST  27  /  ALT  14  /  AlkPhos  59  07-11          Urinalysis Basic - ( 2019 16:51 )    Color: Light Yellow / Appearance: Clear / S.015 / pH: x  Gluc: x / Ketone: Negative  / Bili: Negative / Urobili: Negative   Blood: x / Protein: Negative / Nitrite: Negative   Leuk Esterase: Large / RBC: 1 /hpf / WBC 10 /HPF   Sq Epi: x / Non Sq Epi: 1 /hpf / Bacteria: Negative        RADIOLOGY & ADDITIONAL TESTS:    Imaging Personally Reviewed:    Consultant(s) Notes Reviewed:      Care Discussed with Consultants/Other Providers: Patient is a 88y old  Female who presents with a chief complaint of dizziness and lightheadedness (2019 00:02)      SUBJECTIVE / OVERNIGHT EVENTS: Pt seen and examined. No acute events overnight. Reports improvement in dizziness. Also c/o mild dysuria.    MEDICATIONS  (STANDING):  amLODIPine   Tablet 5 milliGRAM(s) Oral daily  cefTRIAXone   IVPB 1000 milliGRAM(s) IV Intermittent every 24 hours  dextrose 5%. 1000 milliLiter(s) (50 mL/Hr) IV Continuous <Continuous>  dextrose 50% Injectable 12.5 Gram(s) IV Push once  dextrose 50% Injectable 25 Gram(s) IV Push once  dextrose 50% Injectable 25 Gram(s) IV Push once  famotidine    Tablet 20 milliGRAM(s) Oral daily  heparin  Injectable 5000 Unit(s) SubCutaneous every 8 hours  insulin lispro (HumaLOG) corrective regimen sliding scale   SubCutaneous three times a day before meals  insulin lispro (HumaLOG) corrective regimen sliding scale   SubCutaneous at bedtime  lidocaine   Patch 1 Patch Transdermal daily  lisinopril 5 milliGRAM(s) Oral daily  simvastatin 20 milliGRAM(s) Oral at bedtime    MEDICATIONS  (PRN):  acetaminophen   Tablet .. 650 milliGRAM(s) Oral every 8 hours PRN Mild Pain (1 - 3), Moderate Pain (4 - 6)  dextrose 40% Gel 15 Gram(s) Oral once PRN Blood Glucose LESS THAN 70 milliGRAM(s)/deciliter  glucagon  Injectable 1 milliGRAM(s) IntraMuscular once PRN Glucose LESS THAN 70 milligrams/deciliter  meclizine 12.5 milliGRAM(s) Oral every 6 hours PRN Dizziness      Vital Signs Last 24 Hrs  T(C): 37.1 (2019 09:22), Max: 37.1 (2019 09:22)  T(F): 98.8 (2019 09:22), Max: 98.8 (2019 09:22)  HR: 76 (2019 09:22) (63 - 76)  BP: 155/70 (2019 09:22) (121/69 - 155/70)  BP(mean): --  RR: 16 (2019 09:22) (16 - 18)  SpO2: 94% (2019 09:22) (94% - 98%)  CAPILLARY BLOOD GLUCOSE      POCT Blood Glucose.: 151 mg/dL (2019 08:27)  POCT Blood Glucose.: 140 mg/dL (2019 21:43)  POCT Blood Glucose.: 182 mg/dL (2019 18:03)  POCT Blood Glucose.: 164 mg/dL (2019 13:26)    I&O's Summary    2019 07:01  -  2019 07:00  --------------------------------------------------------  IN: 240 mL / OUT: 0 mL / NET: 240 mL        PHYSICAL EXAM:  GENERAL: NAD, anicteric, afebrile  HEAD:  Atraumatic, Normocephalic  EYES: EOMI, PERRLA, conjunctiva and sclera clear  NECK: Supple, No JVD  CHEST/LUNG: Clear to auscultation bilaterally; No wheeze  HEART: Regular rate and rhythm; No murmurs, rubs, or gallops  ABDOMEN: Soft, Nontender, Nondistended; Bowel sounds present  EXTREMITIES:  2+ Peripheral Pulses, No clubbing, cyanosis, or edema  PSYCH: AAOx3  NEUROLOGY: non-focal  SKIN: No rashes or lesions    LABS:                        14.3   8.2   )-----------( 336      ( 2019 18:46 )             42.0     07-12    143  |  102  |  16  ----------------------------<  182<H>  4.6   |  24  |  0.97    Ca    9.7      2019 18:46  Phos  4.2     07-11  Mg     1.9     -11    TPro  7.2  /  Alb  4.1  /  TBili  0.3  /  DBili  x   /  AST  27  /  ALT  14  /  AlkPhos  59  07-11          Urinalysis Basic - ( 2019 16:51 )    Color: Light Yellow / Appearance: Clear / S.015 / pH: x  Gluc: x / Ketone: Negative  / Bili: Negative / Urobili: Negative   Blood: x / Protein: Negative / Nitrite: Negative   Leuk Esterase: Large / RBC: 1 /hpf / WBC 10 /HPF   Sq Epi: x / Non Sq Epi: 1 /hpf / Bacteria: Negative

## 2019-07-13 NOTE — PHYSICAL THERAPY INITIAL EVALUATION ADULT - ACTIVE RANGE OF MOTION EXAMINATION, REHAB EVAL
Decr. L shoulder flex due to h/o mastectomy/bilateral  lower extremity Active ROM was WFL (within functional limits)/Right UE Active ROM was WFL (within functional limits)

## 2019-07-13 NOTE — PHYSICAL THERAPY INITIAL EVALUATION ADULT - GENERAL OBSERVATIONS, REHAB EVAL
Pt received semi-supine in bed, (+) bed alarm, (+) IV, NAD. Pt alert, forgetful, agreeable to PT eval.

## 2019-07-13 NOTE — PROGRESS NOTE ADULT - PROBLEM SELECTOR PLAN 1
- unclear etiology   -negative orthostatics  - CT head unremarkable  - f/up TTE ; carotid duplex US  - c/w Meclizine - unclear etiology   -negative orthostatics  - CT head and carotid duplex US unremarkable  - TTE pending  - c/w Meclizine

## 2019-07-13 NOTE — PHYSICAL THERAPY INITIAL EVALUATION ADULT - DISCHARGE DISPOSITION, PT EVAL
home w/ home PT/home w/ assist/Home with home PT for gait, balance, & strength training and to return pt to baseline functional mobility status. Rolling walker with ambulation (pt owns); pt also has transport nita, commode, shower chair. Supervision/assist with mobility skills at this time (pt has [facility] 7 hrs/day, 6 days/week).

## 2019-07-13 NOTE — PHYSICAL THERAPY INITIAL EVALUATION ADULT - PLANNED THERAPY INTERVENTIONS, PT EVAL
balance training/gait training/GOAL: Pt will negotiate 12 stairs with 1 HR and step to pattern with CGA in 2 weeks./transfer training/bed mobility training

## 2019-07-13 NOTE — PHYSICAL THERAPY INITIAL EVALUATION ADULT - PERTINENT HX OF CURRENT PROBLEM, REHAB EVAL
88yoF with PMH of breast cancer (s/p partial mastectomy, lymph node resection, radiation and chemotherapy), T2DM, htn, hLd, PE, hx of TB who presents to the ED with complaint of lightheadedness and dizziness.

## 2019-07-14 LAB
ANION GAP SERPL CALC-SCNC: 14 MMOL/L — SIGNIFICANT CHANGE UP (ref 5–17)
BUN SERPL-MCNC: 16 MG/DL — SIGNIFICANT CHANGE UP (ref 7–23)
CALCIUM SERPL-MCNC: 8.6 MG/DL — SIGNIFICANT CHANGE UP (ref 8.4–10.5)
CHLORIDE SERPL-SCNC: 104 MMOL/L — SIGNIFICANT CHANGE UP (ref 96–108)
CO2 SERPL-SCNC: 23 MMOL/L — SIGNIFICANT CHANGE UP (ref 22–31)
CREAT SERPL-MCNC: 0.61 MG/DL — SIGNIFICANT CHANGE UP (ref 0.5–1.3)
GLUCOSE SERPL-MCNC: 158 MG/DL — HIGH (ref 70–99)
HCT VFR BLD CALC: 38.8 % — SIGNIFICANT CHANGE UP (ref 34.5–45)
HGB BLD-MCNC: 13.5 G/DL — SIGNIFICANT CHANGE UP (ref 11.5–15.5)
MCHC RBC-ENTMCNC: 29.7 PG — SIGNIFICANT CHANGE UP (ref 27–34)
MCHC RBC-ENTMCNC: 34.7 GM/DL — SIGNIFICANT CHANGE UP (ref 32–36)
MCV RBC AUTO: 85.6 FL — SIGNIFICANT CHANGE UP (ref 80–100)
PLATELET # BLD AUTO: 328 K/UL — SIGNIFICANT CHANGE UP (ref 150–400)
POTASSIUM SERPL-MCNC: 3.8 MMOL/L — SIGNIFICANT CHANGE UP (ref 3.5–5.3)
POTASSIUM SERPL-SCNC: 3.8 MMOL/L — SIGNIFICANT CHANGE UP (ref 3.5–5.3)
RBC # BLD: 4.54 M/UL — SIGNIFICANT CHANGE UP (ref 3.8–5.2)
RBC # FLD: 12.1 % — SIGNIFICANT CHANGE UP (ref 10.3–14.5)
SODIUM SERPL-SCNC: 141 MMOL/L — SIGNIFICANT CHANGE UP (ref 135–145)
WBC # BLD: 8.7 K/UL — SIGNIFICANT CHANGE UP (ref 3.8–10.5)
WBC # FLD AUTO: 8.7 K/UL — SIGNIFICANT CHANGE UP (ref 3.8–10.5)

## 2019-07-14 PROCEDURE — 99232 SBSQ HOSP IP/OBS MODERATE 35: CPT

## 2019-07-14 RX ADMIN — Medication 650 MILLIGRAM(S): at 20:03

## 2019-07-14 RX ADMIN — LISINOPRIL 5 MILLIGRAM(S): 2.5 TABLET ORAL at 05:27

## 2019-07-14 RX ADMIN — FAMOTIDINE 20 MILLIGRAM(S): 10 INJECTION INTRAVENOUS at 12:07

## 2019-07-14 RX ADMIN — SIMVASTATIN 20 MILLIGRAM(S): 20 TABLET, FILM COATED ORAL at 21:28

## 2019-07-14 RX ADMIN — LIDOCAINE 1 PATCH: 4 CREAM TOPICAL at 23:23

## 2019-07-14 RX ADMIN — LIDOCAINE 1 PATCH: 4 CREAM TOPICAL at 12:07

## 2019-07-14 RX ADMIN — AMLODIPINE BESYLATE 5 MILLIGRAM(S): 2.5 TABLET ORAL at 05:27

## 2019-07-14 RX ADMIN — Medication 1: at 17:05

## 2019-07-14 RX ADMIN — HEPARIN SODIUM 5000 UNIT(S): 5000 INJECTION INTRAVENOUS; SUBCUTANEOUS at 14:59

## 2019-07-14 RX ADMIN — CEFTRIAXONE 100 MILLIGRAM(S): 500 INJECTION, POWDER, FOR SOLUTION INTRAMUSCULAR; INTRAVENOUS at 14:58

## 2019-07-14 RX ADMIN — HEPARIN SODIUM 5000 UNIT(S): 5000 INJECTION INTRAVENOUS; SUBCUTANEOUS at 05:27

## 2019-07-14 RX ADMIN — Medication 1: at 12:06

## 2019-07-14 RX ADMIN — Medication 650 MILLIGRAM(S): at 21:00

## 2019-07-14 RX ADMIN — LIDOCAINE 1 PATCH: 4 CREAM TOPICAL at 20:04

## 2019-07-14 RX ADMIN — LIDOCAINE 1 PATCH: 4 CREAM TOPICAL at 00:15

## 2019-07-14 RX ADMIN — HEPARIN SODIUM 5000 UNIT(S): 5000 INJECTION INTRAVENOUS; SUBCUTANEOUS at 21:28

## 2019-07-14 NOTE — PROGRESS NOTE ADULT - SUBJECTIVE AND OBJECTIVE BOX
Patient is a 88y old  Female who presents with a chief complaint of dizziness and lightheadedness (13 Jul 2019 10:45)      SUBJECTIVE / OVERNIGHT EVENTS: No overnight events. Feels well. No longer dizzy. Says she can only want with RW. Denies cp, sob, palpitations. dysuria resolved, no f/c, back pain, abd pain    Tele reviewed: sinus 60-70    MEDICATIONS  (STANDING):  amLODIPine   Tablet 5 milliGRAM(s) Oral daily  cefTRIAXone   IVPB 1000 milliGRAM(s) IV Intermittent every 24 hours  dextrose 5%. 1000 milliLiter(s) (50 mL/Hr) IV Continuous <Continuous>  dextrose 50% Injectable 12.5 Gram(s) IV Push once  dextrose 50% Injectable 25 Gram(s) IV Push once  dextrose 50% Injectable 25 Gram(s) IV Push once  famotidine    Tablet 20 milliGRAM(s) Oral daily  heparin  Injectable 5000 Unit(s) SubCutaneous every 8 hours  insulin lispro (HumaLOG) corrective regimen sliding scale   SubCutaneous three times a day before meals  insulin lispro (HumaLOG) corrective regimen sliding scale   SubCutaneous at bedtime  lidocaine   Patch 1 Patch Transdermal daily  lisinopril 5 milliGRAM(s) Oral daily  simvastatin 20 milliGRAM(s) Oral at bedtime    MEDICATIONS  (PRN):  acetaminophen   Tablet .. 650 milliGRAM(s) Oral every 8 hours PRN Mild Pain (1 - 3), Moderate Pain (4 - 6)  dextrose 40% Gel 15 Gram(s) Oral once PRN Blood Glucose LESS THAN 70 milliGRAM(s)/deciliter  glucagon  Injectable 1 milliGRAM(s) IntraMuscular once PRN Glucose LESS THAN 70 milligrams/deciliter  meclizine 12.5 milliGRAM(s) Oral every 6 hours PRN Dizziness      Vital Signs Last 24 Hrs  T(C): 36.8 (14 Jul 2019 04:24), Max: 37.1 (13 Jul 2019 09:22)  T(F): 98.3 (14 Jul 2019 04:24), Max: 98.8 (13 Jul 2019 09:22)  HR: 65 (14 Jul 2019 05:25) (56 - 76)  BP: 126/72 (14 Jul 2019 05:25) (106/63 - 155/70)  BP(mean): --  RR: 18 (14 Jul 2019 04:24) (16 - 18)  SpO2: 95% (14 Jul 2019 04:24) (94% - 96%)  CAPILLARY BLOOD GLUCOSE      POCT Blood Glucose.: 145 mg/dL (14 Jul 2019 07:42)  POCT Blood Glucose.: 149 mg/dL (13 Jul 2019 21:21)  POCT Blood Glucose.: 160 mg/dL (13 Jul 2019 16:35)  POCT Blood Glucose.: 143 mg/dL (13 Jul 2019 12:03)    I&O's Summary    13 Jul 2019 07:01  -  14 Jul 2019 07:00  --------------------------------------------------------  IN: 605 mL / OUT: 0 mL / NET: 605 mL        PHYSICAL EXAM:  GENERAL: NAD, well-developed  HEAD:  Atraumatic, Normocephalic  NECK: Supple, No JVD  CHEST/LUNG: Clear to auscultation bilaterally; No wheeze  HEART: Regular rate and rhythm;  ABDOMEN: Soft, Nontender, Nondistended; Bowel sounds present  EXTREMITIES:  2+ Peripheral Pulses, No clubbing, cyanosis, or edema  PSYCH: AAOx3  NEUROLOGY: non-focal  SKIN: No rashes or lesions    LABS:                        12.9   7.8   )-----------( 293      ( 13 Jul 2019 14:00 )             38.4     07-14    141  |  104  |  16  ----------------------------<  158<H>  3.8   |  23  |  0.61    Ca    8.6      14 Jul 2019 06:25  Phos  3.2     07-13  Mg     1.7     07-13                    RADIOLOGY & ADDITIONAL TESTS:    Imaging Personally Reviewed:    Consultant(s) Notes Reviewed:  all    Care Discussed with Consultants/Other Providers:

## 2019-07-14 NOTE — PROGRESS NOTE ADULT - PROBLEM SELECTOR PLAN 1
- unclear etiology   -negative orthostatics  - CT head and carotid duplex US unremarkable  - TTE pending  - c/w Meclizine prn

## 2019-07-15 ENCOUNTER — TRANSCRIPTION ENCOUNTER (OUTPATIENT)
Age: 84
End: 2019-07-15

## 2019-07-15 DIAGNOSIS — I35.0 NONRHEUMATIC AORTIC (VALVE) STENOSIS: ICD-10-CM

## 2019-07-15 LAB
ANION GAP SERPL CALC-SCNC: 12 MMOL/L — SIGNIFICANT CHANGE UP (ref 5–17)
BUN SERPL-MCNC: 15 MG/DL — SIGNIFICANT CHANGE UP (ref 7–23)
CALCIUM SERPL-MCNC: 8.8 MG/DL — SIGNIFICANT CHANGE UP (ref 8.4–10.5)
CHLORIDE SERPL-SCNC: 102 MMOL/L — SIGNIFICANT CHANGE UP (ref 96–108)
CO2 SERPL-SCNC: 25 MMOL/L — SIGNIFICANT CHANGE UP (ref 22–31)
CREAT SERPL-MCNC: 0.59 MG/DL — SIGNIFICANT CHANGE UP (ref 0.5–1.3)
CULTURE RESULTS: SIGNIFICANT CHANGE UP
GLUCOSE SERPL-MCNC: 163 MG/DL — HIGH (ref 70–99)
POTASSIUM SERPL-MCNC: 4.2 MMOL/L — SIGNIFICANT CHANGE UP (ref 3.5–5.3)
POTASSIUM SERPL-SCNC: 4.2 MMOL/L — SIGNIFICANT CHANGE UP (ref 3.5–5.3)
SODIUM SERPL-SCNC: 139 MMOL/L — SIGNIFICANT CHANGE UP (ref 135–145)
SPECIMEN SOURCE: SIGNIFICANT CHANGE UP

## 2019-07-15 PROCEDURE — 93306 TTE W/DOPPLER COMPLETE: CPT | Mod: 26

## 2019-07-15 PROCEDURE — 99232 SBSQ HOSP IP/OBS MODERATE 35: CPT

## 2019-07-15 RX ORDER — SIMVASTATIN 20 MG/1
1 TABLET, FILM COATED ORAL
Qty: 0 | Refills: 0 | DISCHARGE
Start: 2019-07-15

## 2019-07-15 RX ORDER — ACETAMINOPHEN 500 MG
2 TABLET ORAL
Qty: 0 | Refills: 0 | DISCHARGE
Start: 2019-07-15

## 2019-07-15 RX ORDER — CLORAZEPATE DIPOTASSIUM 3.75 MG
1 TABLET ORAL
Qty: 0 | Refills: 0 | DISCHARGE

## 2019-07-15 RX ORDER — MECLIZINE HCL 12.5 MG
1 TABLET ORAL
Qty: 0 | Refills: 0 | DISCHARGE

## 2019-07-15 RX ORDER — ACETAMINOPHEN 500 MG
1 TABLET ORAL
Qty: 0 | Refills: 0 | DISCHARGE

## 2019-07-15 RX ORDER — NEOMYCIN/POLYMYXIN B/HYDROCORT
1 SUSPENSION, DROPS(FINAL DOSAGE FORM)(ML) OTIC (EAR)
Qty: 0 | Refills: 0 | DISCHARGE

## 2019-07-15 RX ORDER — FAMOTIDINE 10 MG/ML
1 INJECTION INTRAVENOUS
Qty: 0 | Refills: 0 | DISCHARGE

## 2019-07-15 RX ORDER — FAMOTIDINE 10 MG/ML
1 INJECTION INTRAVENOUS
Qty: 0 | Refills: 0 | DISCHARGE
Start: 2019-07-15

## 2019-07-15 RX ORDER — MECLIZINE HCL 12.5 MG
1 TABLET ORAL
Qty: 0 | Refills: 0 | DISCHARGE
Start: 2019-07-15

## 2019-07-15 RX ORDER — LIDOCAINE 4 G/100G
2 CREAM TOPICAL DAILY
Refills: 0 | Status: DISCONTINUED | OUTPATIENT
Start: 2019-07-15 | End: 2019-07-16

## 2019-07-15 RX ADMIN — HEPARIN SODIUM 5000 UNIT(S): 5000 INJECTION INTRAVENOUS; SUBCUTANEOUS at 05:25

## 2019-07-15 RX ADMIN — Medication 650 MILLIGRAM(S): at 08:37

## 2019-07-15 RX ADMIN — SIMVASTATIN 20 MILLIGRAM(S): 20 TABLET, FILM COATED ORAL at 21:40

## 2019-07-15 RX ADMIN — Medication 2: at 17:59

## 2019-07-15 RX ADMIN — Medication 12.5 MILLIGRAM(S): at 08:37

## 2019-07-15 RX ADMIN — HEPARIN SODIUM 5000 UNIT(S): 5000 INJECTION INTRAVENOUS; SUBCUTANEOUS at 14:22

## 2019-07-15 RX ADMIN — LIDOCAINE 2 PATCH: 4 CREAM TOPICAL at 09:05

## 2019-07-15 RX ADMIN — HEPARIN SODIUM 5000 UNIT(S): 5000 INJECTION INTRAVENOUS; SUBCUTANEOUS at 21:40

## 2019-07-15 RX ADMIN — LISINOPRIL 5 MILLIGRAM(S): 2.5 TABLET ORAL at 05:26

## 2019-07-15 RX ADMIN — FAMOTIDINE 20 MILLIGRAM(S): 10 INJECTION INTRAVENOUS at 09:05

## 2019-07-15 RX ADMIN — Medication 650 MILLIGRAM(S): at 09:37

## 2019-07-15 RX ADMIN — LIDOCAINE 2 PATCH: 4 CREAM TOPICAL at 19:46

## 2019-07-15 RX ADMIN — LIDOCAINE 2 PATCH: 4 CREAM TOPICAL at 21:19

## 2019-07-15 RX ADMIN — Medication 1: at 12:36

## 2019-07-15 RX ADMIN — AMLODIPINE BESYLATE 5 MILLIGRAM(S): 2.5 TABLET ORAL at 05:26

## 2019-07-15 NOTE — PROGRESS NOTE ADULT - SUBJECTIVE AND OBJECTIVE BOX
Patient is a 88y old  Female who presents with a chief complaint of dizziness and lightheadedness (14 Jul 2019 08:45)      SUBJECTIVE / OVERNIGHT EVENTS: No overnight events. Denies any lightheadedness, cp, sob, palpitations. wants to go home    Tele reviewed: sinus 1st degree 50-70    MEDICATIONS  (STANDING):  amLODIPine   Tablet 5 milliGRAM(s) Oral daily  dextrose 5%. 1000 milliLiter(s) (50 mL/Hr) IV Continuous <Continuous>  dextrose 50% Injectable 12.5 Gram(s) IV Push once  dextrose 50% Injectable 25 Gram(s) IV Push once  dextrose 50% Injectable 25 Gram(s) IV Push once  famotidine    Tablet 20 milliGRAM(s) Oral daily  heparin  Injectable 5000 Unit(s) SubCutaneous every 8 hours  insulin lispro (HumaLOG) corrective regimen sliding scale   SubCutaneous three times a day before meals  insulin lispro (HumaLOG) corrective regimen sliding scale   SubCutaneous at bedtime  lidocaine   Patch 2 Patch Transdermal daily  lisinopril 5 milliGRAM(s) Oral daily  simvastatin 20 milliGRAM(s) Oral at bedtime    MEDICATIONS  (PRN):  acetaminophen   Tablet .. 650 milliGRAM(s) Oral every 8 hours PRN Mild Pain (1 - 3), Moderate Pain (4 - 6)  dextrose 40% Gel 15 Gram(s) Oral once PRN Blood Glucose LESS THAN 70 milliGRAM(s)/deciliter  glucagon  Injectable 1 milliGRAM(s) IntraMuscular once PRN Glucose LESS THAN 70 milligrams/deciliter  meclizine 12.5 milliGRAM(s) Oral every 6 hours PRN Dizziness      Vital Signs Last 24 Hrs  T(C): 37.1 (15 Jul 2019 12:28), Max: 37.2 (14 Jul 2019 16:51)  T(F): 98.8 (15 Jul 2019 12:28), Max: 98.9 (14 Jul 2019 16:51)  HR: 70 (15 Jul 2019 12:28) (56 - 79)  BP: 166/61 (15 Jul 2019 12:28) (146/72 - 168/70)  BP(mean): --  RR: 17 (15 Jul 2019 12:28) (17 - 18)  SpO2: 96% (15 Jul 2019 12:28) (94% - 99%)  CAPILLARY BLOOD GLUCOSE      POCT Blood Glucose.: 165 mg/dL (15 Jul 2019 12:31)  POCT Blood Glucose.: 138 mg/dL (15 Jul 2019 07:37)  POCT Blood Glucose.: 150 mg/dL (14 Jul 2019 21:18)  POCT Blood Glucose.: 153 mg/dL (14 Jul 2019 16:39)    I&O's Summary    14 Jul 2019 07:01  -  15 Jul 2019 07:00  --------------------------------------------------------  IN: 635 mL / OUT: 2300 mL / NET: -1665 mL    15 Jul 2019 07:01  -  15 Jul 2019 13:33  --------------------------------------------------------  IN: 300 mL / OUT: 0 mL / NET: 300 mL        PHYSICAL EXAM:  GENERAL: NAD, well-developed  HEAD:  Atraumatic, Normocephalic  NECK: Supple, No JVD  CHEST/LUNG: Clear to auscultation bilaterally; No wheeze  HEART: Regular rate and rhythm;  ABDOMEN: Soft, Nontender, Nondistended; Bowel sounds present  EXTREMITIES:  2+ Peripheral Pulses, No clubbing, cyanosis, or edema  PSYCH: AAOx3    LABS:                        13.5   8.7   )-----------( 328      ( 14 Jul 2019 10:16 )             38.8     07-15    139  |  102  |  15  ----------------------------<  163<H>  4.2   |  25  |  0.59    Ca    8.8      15 Jul 2019 05:36  Phos  3.2     07-13  Mg     1.7     07-13                Culture - Urine (collected 14 Jul 2019 16:18)  Source: .Urine  Final Report (15 Jul 2019 10:55):    <10,000 CFU/mL Normal Urogenital Debra          RADIOLOGY & ADDITIONAL TESTS:    Imaging Personally Reviewed:    Consultant(s) Notes Reviewed:  all    Care Discussed with Consultants/Other Providers:

## 2019-07-15 NOTE — PROGRESS NOTE ADULT - PROBLEM SELECTOR PLAN 3
Patient reports that she is in remission.  - c/w f/up with primary oncologist. symptoms and weakly positive UA  -completed 3 days of ceftriaxone  -urine cx NGTD but sent after antibiotics

## 2019-07-15 NOTE — PROGRESS NOTE ADULT - PROBLEM SELECTOR PLAN 7
VTE ppx: heparin subQ  Activity: OOB with assistance  Diet: DASH, consistent carb  PT rec home PT c/w simvastatin

## 2019-07-15 NOTE — PROGRESS NOTE ADULT - PROBLEM SELECTOR PLAN 1
-likely vasovagal, no LOC  -negative orthostatics  -no events on tele, dc'ed  -CT head and carotid duplex US unremarkable  - TTE pending -likely vasovagal, no LOC, +/-UTI  -negative orthostatics  -no events on tele, dc'ed  -CT head and carotid duplex US unremarkable  -TTE in 9/2017 with mild AS, repeat TTE pending to r/o progression

## 2019-07-15 NOTE — PROGRESS NOTE ADULT - PROBLEM SELECTOR PLAN 4
BP stable  c/w amlodipine 5mg, Lisinopril 5mg qd  Monitor BP FS stable, a1c 7.9  c/w GIANCARLO  c/w FS qac qhs  -restart orals on discharge

## 2019-07-15 NOTE — DISCHARGE NOTE PROVIDER - NSDCHOSPICE_GEN_A_CORE
Pt reports SOB starting today. Denies chest pain. Pt is diaphoretic and sating in 60s on RA. Pt was gasping for air when returning from xray without O2. Pt states she has pain in legs on palpitation. Denies Resp, cardiac hx. 7/10 joint pain   
No

## 2019-07-15 NOTE — DISCHARGE NOTE PROVIDER - NSDCCPCAREPLAN_GEN_ALL_CORE_FT
PRINCIPAL DISCHARGE DIAGNOSIS  Diagnosis: Near syncope  Assessment and Plan of Treatment: Have someone stay with you until you feel stable.  Do not drive, operate machinery, or play sports until your caregiver says it is okay.  Keep all follow-up appointments as directed by your caregiver.   Lie down right away if you start feeling like you might faint. Breathe deeply and steadily. Wait until all the symptoms have passed.Drink enough fluids to keep your urine clear or pale yellow.  If you are taking blood pressure or heart medicine, get up slowly, taking several minutes to sit and then stand. This can reduce dizziness.  SEEK IMMEDIATE MEDICAL CARE IF:  You have a severe headache.  You have unusual pain in the chest, abdomen, or back.  You are bleeding from the mouth or rectum, or you have black or tarry stool.  You have an irregular or very fast heartbeat.  You have pain with breathing.  You have repeated fainting or seizure-like jerking during an episode.  You faint when sitting or lying down.  You have confusion.  You have difficulty walking.  You have severe weakness.  You have vision problems.  If you fainted, call your local emergency services. Do not drive yourself to the hospital        SECONDARY DISCHARGE DIAGNOSES  Diagnosis: Hypertension  Assessment and Plan of Treatment: Low salt diet  Activity as tolerated.  Take all medication as prescribed.  Follow up with your medical doctor for routine blood pressure monitoring at your next visit.  Notify your doctor if you have any of the following symptoms:   Dizziness, Lightheadedness, Blurry vision, Headache, Chest pain, Shortness of breath      Diagnosis: Type 2 diabetes mellitus  Assessment and Plan of Treatment: HgA1C this admission was 7.9.  Make sure you get your HgA1c checked every three months.  If you take oral diabetes medications, check your blood glucose two times a day.  If you take insulin, check your blood glucose before meals and at bedtime.  It's important not to skip any meals.  Keep a log of your blood glucose results and always take it with you to your doctor appointments.  Keep a list of your current medications including injectables and over the counter medications and bring this medication list with you to all your doctor appointments.  If you have not seen your ophthalmologist this year call for appointment.  Check your feet daily for redness, sores, or openings. Do not self treat. If no improvement in two days call your primary care physician for an appointment.  Low blood sugar (hypoglycemia) is a blood sugar below 70mg/dl. Check your blood sugar if you feel signs/symptoms of hypoglycemia. If your blood sugar is below 70 take 15 grams of carbohydrates (ex 4 oz of apple juice, 3-4 glucose tablets, or 4-6 oz of regular soda) wait 15 minutes and repeat blood sugar to make sure it comes up above 70.  If your blood sugar is above 70 and you are due for a meal, have a meal.  If you are not due for a meal have a snack.  This snack helps keeps your blood sugar at a safe range.      Diagnosis: Acute cystitis  Assessment and Plan of Treatment: You have completed your entire course of antibiotics.  Drink enough water and fluids to keep your urine clear or pale yellow.  Avoid caffeine, tea, and carbonated beverages. They tend to irritate your bladder.  Empty your bladder often. Avoid holding urine for long periods of time.  Empty your bladder before and after sexual intercourse.  After a bowel movement, women should cleanse from front to back. Use each tissue only once.  SEEK MEDICAL CARE IF:  You have back pain.  You develop a fever.  Your symptoms do not begin to resolve within 3 days.  SEEK IMMEDIATE MEDICAL CARE IF:  You have severe back pain or lower abdominal pain.  You develop chills.  You have nausea or vomiting.  You have continued burning or discomfort with urination.

## 2019-07-15 NOTE — PROGRESS NOTE ADULT - PROBLEM SELECTOR PLAN 6
symptoms and weakly positive UA  -c/w Ceftriaxone to complete 3 days ( day 3/3)  -urine cx not sent BP stable  c/w amlodipine 5mg, Lisinopril 5mg qd  Monitor BP

## 2019-07-15 NOTE — DISCHARGE NOTE PROVIDER - HOSPITAL COURSE
88 year old female PMH of breast cancer (s/p partial mastectomy, lymph node resection, radiation and chemotherapy), T2DM, HTN, HLD, PE, hx of TB who presents to the ED with complaint of lightheadedness and dizziness. Episode likely vasovagal, no LOC, orthostatics negative, no events were noted on telemetry.  CT Head and carotid duplex US unremarkable. TTE in 9/2017 with mild aortic stenosis, repeat echocardiogram shows ............................  Treated with 3 day course of Ceftriaxone for acute cystitis, urine culture negative but sent after antibiotics started.   Evaluated by physical therapy and recommended for Home PT, family refused services, only wanting resumption of aide services.  Patient stable for discharge to home, follow up with Dr Hurtado PMJEROME. 88yoF with PMH of breast cancer (s/p partial mastectomy, lymph node resection, radiation and chemotherapy), T2DM, htn, hLd, PE, hx of TB who presents to the ED with complaint of lightheadedness and dizziness.          Problem/Plan - 1:    ·  Problem: Near syncope.  Plan: -likely vasovagal, no LOC, +/-UTI    -negative orthostatics    -no events on tele, dc'ed    -CT head and carotid duplex US unremarkable    -TTE this admission unchanged with mild AS, mild LV dysfunction, no changes noted from 2017.          Problem/Plan - 2:    ·  Problem: Aortic stenosis, mild.  Plan: -plan as above.          Problem/Plan - 3:    ·  Problem: Acute cystitis without hematuria.  Plan: symptoms and weakly positive UA    -completed 3 days of ceftriaxone    -urine cx NGTD but sent after antibiotics.          Problem/Plan - 4:    ·  Problem: T2DM (type 2 diabetes mellitus).  Plan: FS stable, a1c 7.9    c/w GIANCARLO    c/w FS qac qhs    -restart orals on discharge.          Problem/Plan - 5:    ·  Problem: Breast cancer.  Plan: Patient reports that she is in remission.    - c/w f/up with primary oncologist.          Problem/Plan - 6:    Problem: HTN (hypertension). Plan: BP stable    c/w amlodipine 5mg, Lisinopril 5mg qd    Monitor BP.         Problem/Plan - 7:    ·  Problem: HLD (hyperlipidemia).  Plan: c/w simvastatin.          Problem/Plan - 8:    ·  Problem: Prophylactic measure.  Plan: VTE ppx: heparin subQ    Pt rec home PT    Dispo: discharge home today with outpatient PCP, onc followup    spent 38min on discharge process time.         Attending Attestation:     Plan discussed with NP Jessy, called daughter several times Ivana [phone] but no answer, left voicemail.

## 2019-07-16 ENCOUNTER — TRANSCRIPTION ENCOUNTER (OUTPATIENT)
Age: 84
End: 2019-07-16

## 2019-07-16 VITALS
TEMPERATURE: 98 F | SYSTOLIC BLOOD PRESSURE: 159 MMHG | RESPIRATION RATE: 18 BRPM | HEART RATE: 64 BPM | OXYGEN SATURATION: 96 % | DIASTOLIC BLOOD PRESSURE: 69 MMHG

## 2019-07-16 PROCEDURE — 80053 COMPREHEN METABOLIC PANEL: CPT

## 2019-07-16 PROCEDURE — 83036 HEMOGLOBIN GLYCOSYLATED A1C: CPT

## 2019-07-16 PROCEDURE — 85027 COMPLETE CBC AUTOMATED: CPT

## 2019-07-16 PROCEDURE — 87086 URINE CULTURE/COLONY COUNT: CPT

## 2019-07-16 PROCEDURE — 70450 CT HEAD/BRAIN W/O DYE: CPT

## 2019-07-16 PROCEDURE — 82803 BLOOD GASES ANY COMBINATION: CPT

## 2019-07-16 PROCEDURE — C8929: CPT

## 2019-07-16 PROCEDURE — 82330 ASSAY OF CALCIUM: CPT

## 2019-07-16 PROCEDURE — 84484 ASSAY OF TROPONIN QUANT: CPT

## 2019-07-16 PROCEDURE — 71045 X-RAY EXAM CHEST 1 VIEW: CPT

## 2019-07-16 PROCEDURE — 83735 ASSAY OF MAGNESIUM: CPT

## 2019-07-16 PROCEDURE — 97161 PT EVAL LOW COMPLEX 20 MIN: CPT

## 2019-07-16 PROCEDURE — 93880 EXTRACRANIAL BILAT STUDY: CPT

## 2019-07-16 PROCEDURE — 99239 HOSP IP/OBS DSCHRG MGMT >30: CPT

## 2019-07-16 PROCEDURE — 99285 EMERGENCY DEPT VISIT HI MDM: CPT | Mod: 25

## 2019-07-16 PROCEDURE — 82947 ASSAY GLUCOSE BLOOD QUANT: CPT

## 2019-07-16 PROCEDURE — 80048 BASIC METABOLIC PNL TOTAL CA: CPT

## 2019-07-16 PROCEDURE — 84132 ASSAY OF SERUM POTASSIUM: CPT

## 2019-07-16 PROCEDURE — 85014 HEMATOCRIT: CPT

## 2019-07-16 PROCEDURE — 82962 GLUCOSE BLOOD TEST: CPT

## 2019-07-16 PROCEDURE — 93005 ELECTROCARDIOGRAM TRACING: CPT

## 2019-07-16 PROCEDURE — 81001 URINALYSIS AUTO W/SCOPE: CPT

## 2019-07-16 PROCEDURE — 84100 ASSAY OF PHOSPHORUS: CPT

## 2019-07-16 PROCEDURE — 82435 ASSAY OF BLOOD CHLORIDE: CPT

## 2019-07-16 PROCEDURE — 83605 ASSAY OF LACTIC ACID: CPT

## 2019-07-16 PROCEDURE — 84295 ASSAY OF SERUM SODIUM: CPT

## 2019-07-16 RX ADMIN — Medication 650 MILLIGRAM(S): at 12:50

## 2019-07-16 RX ADMIN — LISINOPRIL 5 MILLIGRAM(S): 2.5 TABLET ORAL at 05:16

## 2019-07-16 RX ADMIN — AMLODIPINE BESYLATE 5 MILLIGRAM(S): 2.5 TABLET ORAL at 05:16

## 2019-07-16 RX ADMIN — Medication 1: at 11:58

## 2019-07-16 RX ADMIN — LIDOCAINE 2 PATCH: 4 CREAM TOPICAL at 11:59

## 2019-07-16 RX ADMIN — HEPARIN SODIUM 5000 UNIT(S): 5000 INJECTION INTRAVENOUS; SUBCUTANEOUS at 05:16

## 2019-07-16 RX ADMIN — Medication 650 MILLIGRAM(S): at 11:58

## 2019-07-16 RX ADMIN — FAMOTIDINE 20 MILLIGRAM(S): 10 INJECTION INTRAVENOUS at 11:58

## 2019-07-16 NOTE — PROGRESS NOTE ADULT - PROBLEM SELECTOR PLAN 3
symptoms and weakly positive UA  -completed 3 days of ceftriaxone  -urine cx NGTD but sent after antibiotics

## 2019-07-16 NOTE — PROGRESS NOTE ADULT - PROBLEM SELECTOR PLAN 1
-likely vasovagal, no LOC, +/-UTI  -negative orthostatics  -no events on tele, dc'ed  -CT head and carotid duplex US unremarkable  -TTE this admission unchanged with mild AS, mild LV dysfunction, no changes noted from 2017

## 2019-07-16 NOTE — PROGRESS NOTE ADULT - SUBJECTIVE AND OBJECTIVE BOX
Patient is a 88y old  Female who presents with a chief complaint of dizziness and lightheadedness (15 Jul 2019 17:20)      SUBJECTIVE / OVERNIGHT EVENTS: No overnight events. Feels well, denies cp, sob, lightheadedness, n/v, palpitations.     MEDICATIONS  (STANDING):  amLODIPine   Tablet 5 milliGRAM(s) Oral daily  dextrose 5%. 1000 milliLiter(s) (50 mL/Hr) IV Continuous <Continuous>  dextrose 50% Injectable 12.5 Gram(s) IV Push once  dextrose 50% Injectable 25 Gram(s) IV Push once  dextrose 50% Injectable 25 Gram(s) IV Push once  famotidine    Tablet 20 milliGRAM(s) Oral daily  heparin  Injectable 5000 Unit(s) SubCutaneous every 8 hours  insulin lispro (HumaLOG) corrective regimen sliding scale   SubCutaneous three times a day before meals  insulin lispro (HumaLOG) corrective regimen sliding scale   SubCutaneous at bedtime  lidocaine   Patch 2 Patch Transdermal daily  lisinopril 5 milliGRAM(s) Oral daily  simvastatin 20 milliGRAM(s) Oral at bedtime    MEDICATIONS  (PRN):  acetaminophen   Tablet .. 650 milliGRAM(s) Oral every 8 hours PRN Mild Pain (1 - 3), Moderate Pain (4 - 6)  dextrose 40% Gel 15 Gram(s) Oral once PRN Blood Glucose LESS THAN 70 milliGRAM(s)/deciliter  glucagon  Injectable 1 milliGRAM(s) IntraMuscular once PRN Glucose LESS THAN 70 milligrams/deciliter  meclizine 12.5 milliGRAM(s) Oral every 6 hours PRN Dizziness      Vital Signs Last 24 Hrs  T(C): 36.4 (16 Jul 2019 11:35), Max: 36.8 (15 Jul 2019 17:55)  T(F): 97.6 (16 Jul 2019 11:35), Max: 98.2 (15 Jul 2019 17:55)  HR: 64 (16 Jul 2019 11:35) (60 - 70)  BP: 159/69 (16 Jul 2019 11:35) (114/68 - 159/69)  BP(mean): --  RR: 18 (16 Jul 2019 11:35) (17 - 18)  SpO2: 96% (16 Jul 2019 11:35) (94% - 98%)  CAPILLARY BLOOD GLUCOSE      POCT Blood Glucose.: 158 mg/dL (16 Jul 2019 11:45)  POCT Blood Glucose.: 135 mg/dL (16 Jul 2019 07:34)  POCT Blood Glucose.: 153 mg/dL (15 Jul 2019 21:44)  POCT Blood Glucose.: 217 mg/dL (15 Jul 2019 17:51)  POCT Blood Glucose.: 213 mg/dL (15 Jul 2019 16:36)    I&O's Summary    15 Jul 2019 07:01  -  16 Jul 2019 07:00  --------------------------------------------------------  IN: 410 mL / OUT: 650 mL / NET: -240 mL    16 Jul 2019 07:01  -  16 Jul 2019 12:32  --------------------------------------------------------  IN: 240 mL / OUT: 0 mL / NET: 240 mL                PHYSICAL EXAM:  GENERAL: NAD, well-developed  HEAD:  Atraumatic, Normocephalic  NECK: Supple, No JVD  CHEST/LUNG: Clear to auscultation bilaterally; No wheeze  HEART: Regular rate and rhythm;  ABDOMEN: Soft, Nontender, Nondistended; Bowel sounds present  EXTREMITIES:  2+ Peripheral Pulses, No clubbing, cyanosis, or edema  PSYCH: AAOx3        LABS:    07-15    139  |  102  |  15  ----------------------------<  163<H>  4.2   |  25  |  0.59    Ca    8.8      15 Jul 2019 05:36                Culture - Urine (collected 14 Jul 2019 16:18)  Source: .Urine  Final Report (15 Jul 2019 10:55):    <10,000 CFU/mL Normal Urogenital Debra          RADIOLOGY & ADDITIONAL TESTS:    Imaging Personally Reviewed: TTE: TTE:     Conclusions:  1. Mitral annular calcification and calcified mitral  leaflets with normal diastolic opening.  2. Calcified trileaflet aortic valve with decreased  opening. Peak transaortic valve gradient equals 23 mm Hg,  mean transaortic valve gradient equals 12 mm Hg, aortic  valve velocity time integral equals 51 cm, consistent with  mild aortic stenosis.  3. Mild to moderate global left ventricular systolic  dysfunction with regional wall motion abnormalities and  conduction defect. Endocardial visualization enhanced with  intravenous injection of Ultrasonic Enhancing Agent  (Definity). Hypokinesis of the mid to distal anterior,  anteroseptal, and septal walls.  4. Mild diastolic dysfunction (Stage I).  5. The right ventricle is not well visualized; grossly  normal right ventricular systolic function.  *** Compared with echocardiogram of 9/26/2017, no  significant changes noted.        Consultant(s) Notes Reviewed:  all    Care Discussed with Consultants/Other Providers:

## 2019-07-16 NOTE — PROGRESS NOTE ADULT - PROBLEM SELECTOR PLAN 8
VTE ppx: heparin subQ  Pt rec home PT  Dispo: discharge home today with outpatient PCP, onc followup  spent 38min on discharge process time
VTE ppx: heparin subQ  Pt rec home PT  Dispo: discharge home today with home PT if TTE without acute or urgent findings  spent 43 min on discharge process time

## 2019-07-16 NOTE — PROGRESS NOTE ADULT - NSHPATTENDINGPLANDISCUSS_GEN_ALL_CORE
LUCAS Bruno
Covering NP
LUCAS Jiménez, called daughter several times Ivana [phone] but no answer, left voicemail
LUCAS Harper

## 2019-07-16 NOTE — DISCHARGE NOTE NURSING/CASE MANAGEMENT/SOCIAL WORK - NSDCDPATPORTLINK_GEN_ALL_CORE
You can access the [facility], offered by [facility], by registering with the following website: [email or web address]
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
## 2021-12-14 NOTE — ED PROVIDER NOTE - SECONDARY DIAGNOSIS.
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 Plan: A glasses prescription has been discussed and generated. Patient to call with any concerns.
Muscle strain

## 2022-01-01 NOTE — PROGRESS NOTE ADULT - PROBLEM SELECTOR PLAN 5
On metformin 1000 BID at home  - will cover with ISS qAC and qhs   - FSG acceptable for now I will SWITCH the dose or number of times a day I take the medications listed below when I get home from the hospital:  None

## 2022-03-21 NOTE — PATIENT PROFILE ADULT - NSASFUNCLEVELADLTRANSFER_GEN_A_NUR
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 Discharged last Thursday, visit post transplant  2. Respiratory - doing well, needs an extra pillow when lying down. Gets short of breath with activity.   3. GI: appetite okay. BM x2 daily - firming up   4. Incisional pain    Activity/rehab: pulmonary rehab eval Friday 9AM  Oxygen needs: room air, no BiPAP NOC  Pain management/RX: incisional pain  Diabetic management: a little high and a little low  Next Bronch due: due 3/22  High risk donor:   Risk Criteria Labs:   CMV status:  Valcyte stopped:   EBV status:  DVT/PE:  Post op AFIB/follow up with EP:  AC/asa:   PJP prophylactic: Dapsone    COVID:  1. COVID-19 infection (yes/no, date of most recent positive test):   2. Status/instructions given about COVID-19 vaccine:     Pt Education: medications (use/dose/side effects), how/when to call coordinator, frequency of labs, s/s of infection/rejection, call prior to starting any new medications, lab/vital sign book    Health Maintenance:     Last colonoscopy:     Next colonoscopy due:     Dermatology:    Vaccinations this visit:     Labs, CXR, PFTs reviewed with patient  Medication record reviewed and reconciled  Questions and concerns addressed    Patient Instructions  1. Try to hydrate with 60-70 oz (4-5 bottles of water) fluids daily. Try to limit coffee and tea (unless decaffeinated).   2. Continue to exercise daily or most days of the week.  3. You are set up for pulmonary rehab Friday, 3/25 at 9AM   4. Try to limit your salt intake (try to limit processed foods as well).   5. We will have the dietitian talk to you guys about high protein diet.  6. It doesn't seem like the COVID vaccine is working well in lung transplant patients. A number of lung transplant patients have gotten sick with COVID even after receiving the vaccines.  Based on  our recent experience, it can be life-threatening to get COVID  even after being vaccinated. Please continue to act like you did not get the COVID vaccine - social distancing, wearing a mask, good hand hygiene, etc. If the people around you are vaccinated, it will help reduce the risk of you getting COVID. All members of your household should be vaccinated.  7. Take Tylenol (3 regular strength or 2 extra strength) three times a day to keep the pain manageable.   8. Take Acyclovir 2 tablets twice a day until you run out.   9. Take doxycycline through 4/1.   10. Stop taking Glipizide.     Next transplant clinic appointment: later this week with CXR, labs.   Next lab draw: Later this week.       AVS printed at time of check out         3 = assistive equipment and person

## 2022-04-26 NOTE — DISCHARGE NOTE ADULT - PROVIDER TOKENS
advanced age, chronic disease, anorexia/decreased po intake/other (specify) FREE:[LAST:[Primary Care Doctor],PHONE:[phone]],FAX:[(   )    -],ADDRESS:[[facility]   [phone] [place], [place] [id number]]]

## 2022-06-01 NOTE — PROGRESS NOTE ADULT - PROVIDER SPECIALTY LIST ADULT
Internal Medicine Detail Level: Generalized General Sunscreen Counseling: I recommended a broad spectrum sunscreen with a SPF of 30 or higher.  I explained that SPF 30 sunscreens block approximately 97 percent of the sun's harmful rays.  Sunscreens should be applied at least 15 minutes prior to expected sun exposure and then every 2 hours after that as long as sun exposure continues. If swimming or exercising sunscreen should be reapplied every 45 minutes to an hour after getting wet or sweating.  One ounce, or the equivalent of a shot glass full of sunscreen, is adequate to protect the skin not covered by a bathing suit. I also recommended a lip balm with a sunscreen as well. Sun protective clothing can be used in lieu of sunscreen but must be worn the entire time you are exposed to the sun's rays.

## 2022-06-23 NOTE — PROGRESS NOTE ADULT - REASON FOR ADMISSION
Department of Anesthesiology  Postprocedure Note    Patient: Laila Reynoso  MRN: [id number]  YOB: 1954  Date of evaluation: 6/23/2022      Procedure Summary     Date: 06/23/22 Room / Location: [address]    Anesthesia Start: 1206 Anesthesia Stop: 2592    Procedure: COLONOSCOPY DIAGNOSTIC (N/A ) Diagnosis:       Gastrointestinal hemorrhage, unspecified gastrointestinal hemorrhage type      (Gastrointestinal hemorrhage, unspecified gastrointestinal hemorrhage type [K92.2])    Surgeons: Shar Mcclelland MD Responsible Provider: Gisela Sneed MD    Anesthesia Type: MAC ASA Status: 2          Anesthesia Type: No value filed.     Dieudonne Phase I:      Dieudonne Phase II:        Anesthesia Post Evaluation    Patient location during evaluation: bedside  Patient participation: complete - patient participated  Level of consciousness: awake  Airway patency: patent  Nausea & Vomiting: no nausea and no vomiting  Complications: no  Cardiovascular status: blood pressure returned to baseline  Respiratory status: acceptable and room air  Hydration status: euvolemic
dizziness and lightheadedness

## 2022-11-21 NOTE — PATIENT PROFILE ADULT - NSPROGENANESREACTION_GEN_A_NUR
Bill For Simulation And Treatment Device Design: Yes - (Intermediate Simulation: 81476) Bill For Simulation And Treatment Device Design: Yes - (Intermediate Simulation: 48823) no previous reaction

## 2023-02-17 ENCOUNTER — INPATIENT (INPATIENT)
Facility: HOSPITAL | Age: 88
LOS: 0 days | Discharge: HOME | DRG: 871 | End: 2023-02-18
Attending: STUDENT IN AN ORGANIZED HEALTH CARE EDUCATION/TRAINING PROGRAM | Admitting: STUDENT IN AN ORGANIZED HEALTH CARE EDUCATION/TRAINING PROGRAM
Payer: MEDICARE

## 2023-02-17 VITALS
TEMPERATURE: 98 F | OXYGEN SATURATION: 92 % | RESPIRATION RATE: 16 BRPM | SYSTOLIC BLOOD PRESSURE: 142 MMHG | WEIGHT: 160.06 LBS | HEIGHT: 63 IN | DIASTOLIC BLOOD PRESSURE: 91 MMHG | HEART RATE: 143 BPM

## 2023-02-17 DIAGNOSIS — Z90.12 ACQUIRED ABSENCE OF LEFT BREAST AND NIPPLE: Chronic | ICD-10-CM

## 2023-02-17 DIAGNOSIS — Z90.710 ACQUIRED ABSENCE OF BOTH CERVIX AND UTERUS: Chronic | ICD-10-CM

## 2023-02-17 LAB
APPEARANCE UR: CLEAR — SIGNIFICANT CHANGE UP
BACTERIA # UR AUTO: ABNORMAL
BASE EXCESS BLDV CALC-SCNC: -7.9 MMOL/L — LOW (ref -2–3)
BASOPHILS # BLD AUTO: 0.02 K/UL — SIGNIFICANT CHANGE UP (ref 0–0.2)
BASOPHILS NFR BLD AUTO: 0.1 % — SIGNIFICANT CHANGE UP (ref 0–2)
BILIRUB UR-MCNC: NEGATIVE — SIGNIFICANT CHANGE UP
CA-I SERPL-SCNC: 1.13 MMOL/L — LOW (ref 1.15–1.33)
CHLORIDE BLDV-SCNC: 104 MMOL/L — SIGNIFICANT CHANGE UP (ref 96–108)
CO2 BLDV-SCNC: 22 MMOL/L — SIGNIFICANT CHANGE UP (ref 22–26)
COLOR SPEC: YELLOW — SIGNIFICANT CHANGE UP
DIFF PNL FLD: NEGATIVE — SIGNIFICANT CHANGE UP
EOSINOPHIL # BLD AUTO: 0 K/UL — SIGNIFICANT CHANGE UP (ref 0–0.5)
EOSINOPHIL NFR BLD AUTO: 0 % — SIGNIFICANT CHANGE UP (ref 0–6)
EPI CELLS # UR: 1 /HPF — SIGNIFICANT CHANGE UP
GAS PNL BLDV: 137 MMOL/L — SIGNIFICANT CHANGE UP (ref 136–145)
GAS PNL BLDV: SIGNIFICANT CHANGE UP
GAS PNL BLDV: SIGNIFICANT CHANGE UP
GLUCOSE BLDC GLUCOMTR-MCNC: 469 MG/DL — CRITICAL HIGH (ref 70–99)
GLUCOSE BLDV-MCNC: 563 MG/DL — CRITICAL HIGH (ref 70–99)
GLUCOSE UR QL: ABNORMAL
HCO3 BLDV-SCNC: 20 MMOL/L — LOW (ref 22–29)
HCT VFR BLD CALC: 41.9 % — SIGNIFICANT CHANGE UP (ref 34.5–45)
HCT VFR BLDA CALC: 38 % — SIGNIFICANT CHANGE UP (ref 34.5–46.5)
HGB BLD CALC-MCNC: 12.6 G/DL — SIGNIFICANT CHANGE UP (ref 11.7–16.1)
HGB BLD-MCNC: 12.8 G/DL — SIGNIFICANT CHANGE UP (ref 11.5–15.5)
HYALINE CASTS # UR AUTO: 1 /LPF — SIGNIFICANT CHANGE UP (ref 0–2)
IMM GRANULOCYTES NFR BLD AUTO: 0.9 % — SIGNIFICANT CHANGE UP (ref 0–0.9)
KETONES UR-MCNC: SIGNIFICANT CHANGE UP
LACTATE BLDV-MCNC: 4.3 MMOL/L — CRITICAL HIGH (ref 0.5–2)
LEUKOCYTE ESTERASE UR-ACNC: NEGATIVE — SIGNIFICANT CHANGE UP
LYMPHOCYTES # BLD AUTO: 0.52 K/UL — LOW (ref 1–3.3)
LYMPHOCYTES # BLD AUTO: 3.2 % — LOW (ref 13–44)
MAGNESIUM SERPL-MCNC: 2.5 MG/DL — SIGNIFICANT CHANGE UP (ref 1.6–2.6)
MCHC RBC-ENTMCNC: 27.1 PG — SIGNIFICANT CHANGE UP (ref 27–34)
MCHC RBC-ENTMCNC: 30.5 GM/DL — LOW (ref 32–36)
MCV RBC AUTO: 88.8 FL — SIGNIFICANT CHANGE UP (ref 80–100)
MONOCYTES # BLD AUTO: 0.81 K/UL — SIGNIFICANT CHANGE UP (ref 0–0.9)
MONOCYTES NFR BLD AUTO: 5 % — SIGNIFICANT CHANGE UP (ref 2–14)
NEUTROPHILS # BLD AUTO: 14.86 K/UL — HIGH (ref 1.8–7.4)
NEUTROPHILS NFR BLD AUTO: 90.8 % — HIGH (ref 43–77)
NITRITE UR-MCNC: NEGATIVE — SIGNIFICANT CHANGE UP
NRBC # BLD: 3 /100 WBCS — HIGH (ref 0–0)
NT-PROBNP SERPL-SCNC: HIGH PG/ML (ref 0–300)
PCO2 BLDV: 52 MMHG — HIGH (ref 39–42)
PH BLDV: 7.2 — LOW (ref 7.32–7.43)
PH UR: 6 — SIGNIFICANT CHANGE UP (ref 5–8)
PLATELET # BLD AUTO: 261 K/UL — SIGNIFICANT CHANGE UP (ref 150–400)
PO2 BLDV: 38 MMHG — SIGNIFICANT CHANGE UP (ref 25–45)
POTASSIUM BLDV-SCNC: 5.2 MMOL/L — HIGH (ref 3.5–5.1)
PROCALCITONIN SERPL-MCNC: 0.28 NG/ML — HIGH (ref 0.02–0.1)
PROT UR-MCNC: ABNORMAL
RAPID RVP RESULT: SIGNIFICANT CHANGE UP
RBC # BLD: 4.72 M/UL — SIGNIFICANT CHANGE UP (ref 3.8–5.2)
RBC # FLD: 16 % — HIGH (ref 10.3–14.5)
RBC CASTS # UR COMP ASSIST: 1 /HPF — SIGNIFICANT CHANGE UP (ref 0–4)
SAO2 % BLDV: 50.3 % — LOW (ref 67–88)
SARS-COV-2 RNA SPEC QL NAA+PROBE: SIGNIFICANT CHANGE UP
SP GR SPEC: 1.03 — HIGH (ref 1.01–1.02)
TROPONIN T, HIGH SENSITIVITY RESULT: 974 NG/L — HIGH (ref 0–51)
UROBILINOGEN FLD QL: ABNORMAL
WBC # BLD: 16.35 K/UL — HIGH (ref 3.8–10.5)
WBC # FLD AUTO: 16.35 K/UL — HIGH (ref 3.8–10.5)
WBC UR QL: 1 /HPF — SIGNIFICANT CHANGE UP (ref 0–5)

## 2023-02-17 PROCEDURE — 71045 X-RAY EXAM CHEST 1 VIEW: CPT | Mod: 26

## 2023-02-17 PROCEDURE — 70450 CT HEAD/BRAIN W/O DYE: CPT | Mod: 26,MA

## 2023-02-17 PROCEDURE — 99292 CRITICAL CARE ADDL 30 MIN: CPT

## 2023-02-17 PROCEDURE — 99291 CRITICAL CARE FIRST HOUR: CPT

## 2023-02-17 RX ORDER — PIPERACILLIN AND TAZOBACTAM 4; .5 G/20ML; G/20ML
3.38 INJECTION, POWDER, LYOPHILIZED, FOR SOLUTION INTRAVENOUS ONCE
Refills: 0 | Status: COMPLETED | OUTPATIENT
Start: 2023-02-17 | End: 2023-02-17

## 2023-02-17 RX ORDER — INSULIN HUMAN 100 [IU]/ML
7.26 INJECTION, SOLUTION SUBCUTANEOUS
Qty: 100 | Refills: 0 | Status: DISCONTINUED | OUTPATIENT
Start: 2023-02-17 | End: 2023-02-17

## 2023-02-17 RX ORDER — INSULIN HUMAN 100 [IU]/ML
0.1 INJECTION, SOLUTION SUBCUTANEOUS
Qty: 100 | Refills: 0 | Status: DISCONTINUED | OUTPATIENT
Start: 2023-02-17 | End: 2023-02-17

## 2023-02-17 RX ORDER — INSULIN HUMAN 100 [IU]/ML
3.65 INJECTION, SOLUTION SUBCUTANEOUS
Qty: 100 | Refills: 0 | Status: DISCONTINUED | OUTPATIENT
Start: 2023-02-17 | End: 2023-02-18

## 2023-02-17 RX ORDER — ACETAMINOPHEN 500 MG
1000 TABLET ORAL ONCE
Refills: 0 | Status: COMPLETED | OUTPATIENT
Start: 2023-02-17 | End: 2023-02-17

## 2023-02-17 RX ORDER — ACYCLOVIR SODIUM 500 MG
750 VIAL (EA) INTRAVENOUS ONCE
Refills: 0 | Status: COMPLETED | OUTPATIENT
Start: 2023-02-17 | End: 2023-02-17

## 2023-02-17 RX ORDER — METOPROLOL TARTRATE 50 MG
5 TABLET ORAL ONCE
Refills: 0 | Status: DISCONTINUED | OUTPATIENT
Start: 2023-02-17 | End: 2023-02-17

## 2023-02-17 RX ORDER — HEPARIN SODIUM 5000 [USP'U]/ML
5000 INJECTION INTRAVENOUS; SUBCUTANEOUS EVERY 8 HOURS
Refills: 0 | Status: DISCONTINUED | OUTPATIENT
Start: 2023-02-17 | End: 2023-02-18

## 2023-02-17 RX ORDER — PANTOPRAZOLE SODIUM 20 MG/1
40 TABLET, DELAYED RELEASE ORAL DAILY
Refills: 0 | Status: DISCONTINUED | OUTPATIENT
Start: 2023-02-17 | End: 2023-02-18

## 2023-02-17 RX ORDER — SODIUM CHLORIDE 9 MG/ML
1000 INJECTION, SOLUTION INTRAVENOUS ONCE
Refills: 0 | Status: DISCONTINUED | OUTPATIENT
Start: 2023-02-17 | End: 2023-02-17

## 2023-02-17 RX ORDER — SODIUM CHLORIDE 9 MG/ML
1000 INJECTION, SOLUTION INTRAVENOUS
Refills: 0 | Status: DISCONTINUED | OUTPATIENT
Start: 2023-02-17 | End: 2023-02-18

## 2023-02-17 RX ORDER — CHLORHEXIDINE GLUCONATE 213 G/1000ML
1 SOLUTION TOPICAL
Refills: 0 | Status: DISCONTINUED | OUTPATIENT
Start: 2023-02-17 | End: 2023-02-18

## 2023-02-17 RX ORDER — SODIUM CHLORIDE 9 MG/ML
1000 INJECTION, SOLUTION INTRAVENOUS ONCE
Refills: 0 | Status: COMPLETED | OUTPATIENT
Start: 2023-02-17 | End: 2023-02-17

## 2023-02-17 RX ADMIN — Medication 1000 MILLIGRAM(S): at 21:45

## 2023-02-17 RX ADMIN — Medication 400 MILLIGRAM(S): at 21:27

## 2023-02-17 RX ADMIN — INSULIN HUMAN 3.65 UNIT(S)/HR: 100 INJECTION, SOLUTION SUBCUTANEOUS at 21:46

## 2023-02-17 RX ADMIN — SODIUM CHLORIDE 2000 MILLILITER(S): 9 INJECTION, SOLUTION INTRAVENOUS at 18:50

## 2023-02-17 RX ADMIN — SODIUM CHLORIDE 1000 MILLILITER(S): 9 INJECTION, SOLUTION INTRAVENOUS at 20:03

## 2023-02-17 RX ADMIN — PIPERACILLIN AND TAZOBACTAM 200 GRAM(S): 4; .5 INJECTION, POWDER, LYOPHILIZED, FOR SOLUTION INTRAVENOUS at 21:03

## 2023-02-17 RX ADMIN — PIPERACILLIN AND TAZOBACTAM 3.38 GRAM(S): 4; .5 INJECTION, POWDER, LYOPHILIZED, FOR SOLUTION INTRAVENOUS at 21:38

## 2023-02-17 RX ADMIN — SODIUM CHLORIDE 150 MILLILITER(S): 9 INJECTION, SOLUTION INTRAVENOUS at 20:09

## 2023-02-17 RX ADMIN — Medication 265 MILLIGRAM(S): at 21:43

## 2023-02-17 NOTE — ED PROVIDER NOTE - NSICDXFAMILYHX_GEN_ALL_CORE_FT
CLINICAL PHARMACY NOTE: MEDS TO BEDS    Total # of Prescriptions Filled: 1   The following medications were delivered to the patient:  · Fluticasone     Additional Documentation:  Del meds FAMILY HISTORY:  Family history of active pulmonary tuberculosis in patient's mother    Child  Still living? Unknown  Family history of breast cancer, Age at diagnosis: Age Unknown

## 2023-02-17 NOTE — ED PROVIDER NOTE - ATTENDING CONTRIBUTION TO CARE
------------ATTENDING NOTE------------ ------------ATTENDING NOTE------------  pt brought to ED by daughter c/o 48 hrs of confusion/delirium, decreased responsiveness (normal oriented/talkative, nonambulatory), decreased PO, increased urination, no fevers, complicated as recently diagnosed w/ UTI but not given antibiotics and tx for R flank shingles (has been improving), no falls/trauma, significantly dehydrated appearing, in A fib/RVR, significant hyperglycemia, awaiting labs/imaging and close reassessments, daughter understand severity of presentation, pt DNR/DNI -->  - Dusty Hair MD   --------------------------------------------- ------------ATTENDING NOTE------------  pt brought to ED by daughter c/o 48 hrs of confusion/delirium, decreased responsiveness (normal oriented/talkative, nonambulatory), decreased PO, increased urination, no fevers, complicated as recently diagnosed w/ UTI but not given antibiotics and tx for R flank shingles (has been improving), no falls/trauma, significantly dehydrated appearing, in A fib/RVR, significant hyperglycemia, awaiting labs/imaging and close reassessments, daughter understand severity of presentation, pt DNR/DNI --> improving slowly w/ IVF, empiric antibiotics (may had PNA) and antivirals (give healing shingles), labs concerning DKA (acidosis, ketones, hyperglycemia) and starting Insulin IV gtt, MICU / Cardiology consulted, family updated -->  - Dusty Hair MD   ---------------------------------------------

## 2023-02-17 NOTE — ED ADULT TRIAGE NOTE - CHIEF COMPLAINT QUOTE
pain at shingles site, right side of mid abdomen,  decrease appetite, feeling weak, finished the whole treatment of Valacyclovir for 5 days

## 2023-02-17 NOTE — H&P ADULT - HISTORY OF PRESENT ILLNESS
== template placeholder draft - patient to be seen - plan not finalized till attested by attending ==   89F DNR/DNI at baseline oriented/talkative, nonambulatory, with PMH of breast cancer (metastatic, s/p L mastectomy, chemo+RT 10 years ago), HLD, HTN, DM2, overweight, HFrEF (TTE 2019: 40-45% EF, mild/moderate global LVSD), PE 2012, TB (remote), Afib (not on treatment, hasn't seen cardiologist in years), diagnosed with shingles on RUQ 3 days prior to admission (treated with valacyclovir, gabapentin), brought in by daughter for 2 days of delirium and poor po intake; found to have DKA, Afib with RVR to 150s, NSTEMI, possible pneumonia, UTI; admitted to MICU for further management.      == template placeholder draft - patient to be seen - plan not finalized till attested by attending ==   89F DNR/DNI at baseline oriented/talkative, nonambulatory, with PMH of breast cancer (metastatic, s/p L mastectomy, lymph node dissection, chemo+RT 10 years ago), HLD, HTN, DM2, overweight, HFrEF (TTE 2019: 40-45% EF, mild/moderate global LVSD), PE 2012, TB (remote), Afib (not on treatment, has not seen cardiologist in years), recently diagnosed with shingles on RUQ 1/28 prior to admission (treated with valacyclovir *5 days, gabapentin), brought in by daughter for 2 days of delirium and poor po intake; found to have DKA, Afib with RVR to 150s, NSTEMI, elevated LFTs, possible pneumonia, UTI, meeting criteria for severe sepsis with multiple organ dysfunction; admitted to MICU for further management.    In ED, T 100.4  , received LR bolus and maintenance, acyclovir, Zosyn, insulin gtt, UA+ many bacteria, CXR +RLL and retrocardiac opacities, CTH no acute finding. 89F DNR/DNI at baseline oriented/talkative, nonambulatory, with PMH of breast cancer (metastatic, s/p L mastectomy, lymph node dissection, chemo+RT 10 years ago), HLD, HTN, DM2, overweight, HFrEF (TTE 2019: 40-45% EF, mild/moderate global LVSD), PE 2012, TB (remote), Afib (not on treatment, has not seen cardiologist in years), recently diagnosed with shingles on RUQ 1/28 prior to admission (treated with valacyclovir *5 days, gabapentin), brought in by daughter for 2 days of delirium and poor po intake; found to have DKA, Afib with RVR to 150s, NSTEMI, elevated LFTs, possible pneumonia, UTI, meeting criteria for severe sepsis with multiple organ dysfunction; admitted to MICU for further management.    In ED, T 100.4  , received LR bolus and maintenance, acyclovir, Zosyn, insulin gtt, UA+ many bacteria, CXR +RLL and retrocardiac opacities, CTH no acute finding. 89F DNR/DNI at baseline oriented/talkative, nonambulatory, with PMH of breast cancer (metastatic, s/p L mastectomy, lymph node dissection, chemo+RT 10 years ago), HLD, HTN, DM2, overweight, HFrEF (TTE 2019: 40-45% EF, mild/moderate global LVSD), PE 2012, TB (remote), Afib (not on treatment, has not seen cardiologist in years), recently diagnosed with shingles on RUQ 1/28 prior to admission (treated with valacyclovir *5 days, gabapentin), brought in by daughter for 2 days of delirium and poor po intake; found to have DKA, Afib with RVR to 150s, NSTEMI, elevated LFTs, possible pneumonia, UTI, meeting criteria for severe sepsis with multiple organ dysfunction; admitted to MICU for further management.    In ED, T 100.4  , received LR bolus and maintenance, acyclovir, Zosyn, insulin gtt,  CXR +RLL and retrocardiac opacities, CTH no acute finding.

## 2023-02-17 NOTE — H&P ADULT - NSHPLABSRESULTS_GEN_ALL_CORE
LABS:                        12.8   16.35 )-----------( 261      ( 2023 19:11 )             41.9     Hgb Trend: 12.8<--      139  |  103  |  66<H>  ----------------------------<  582<HH>  5.1   |  12<L>  |  1.28    Ca    8.5      2023 19:11  Mg     2.5         TPro  6.6  /  Alb  3.6  /  TBili  1.2  /  DBili  x   /  AST  642<H>  /  ALT  579<H>  /  AlkPhos  148<H>      Creatinine Trend: 1.28<--    Urinalysis Basic - ( 2023 19:13 )    Color: Yellow / Appearance: Clear / S.029 / pH: x  Gluc: x / Ketone: Trace  / Bili: Negative / Urobili: 3 mg/dL   Blood: x / Protein: 100 mg/dl / Nitrite: Negative   Leuk Esterase: Negative / RBC: 1 /hpf / WBC 1 /HPF   Sq Epi: x / Non Sq Epi: 1 /hpf / Bacteria: Many        Venous Blood Gas:   @ 19:45  7.20/52/38/20/50.3  VBG Lactate: 4.3      MICROBIOLOGY: personally reviewed - previous cultures neg    RADIOLOGY & ADDITIONAL TESTS:    Personally reviewed: CXR [x] CTH [x] CT Chest [x] CTAP [x]    < from: TTE with Doppler (w/Cont) (07.15.19 @ 11:44) >    EF (Visual Estimate): 40-45 %  ...  Conclusions:  1. Mitral annular calcification and calcified mitral  leaflets with normal diastolic opening.  2. Calcified trileaflet aortic valve with decreased  opening. Peak transaortic valve gradient equals 23 mm Hg,  mean transaortic valve gradient equals 12 mm Hg, aortic  valve velocity time integral equals 51 cm, consistent with  mildaortic stenosis.  3. Mild to moderate global left ventricular systolic  dysfunction with regional wall motion abnormalities and  conduction defect. Endocardial visualization enhanced with  intravenous injection of Ultrasonic Enhancing Agent  (Definity). Hypokinesis of the mid to distal anterior,  anteroseptal, and septal walls.  4. Mild diastolic dysfunction (Stage I).  5. The right ventricle is not well visualized; grossly  normal right ventricular systolic function.  *** Compared with echocardiogram of 2017, no  significant changes noted.    < end of copied text > LABS:                        12.8   16.35 )-----------( 261      ( 2023 19:11 )             41.9     Hgb Trend: 12.8<--      139  |  103  |  66<H>  ----------------------------<  582<HH>  5.1   |  12<L>  |  1.28    Ca    8.5      2023 19:11  Mg     2.5         TPro  6.6  /  Alb  3.6  /  TBili  1.2  /  DBili  x   /  AST  642<H>  /  ALT  579<H>  /  AlkPhos  148<H>      Creatinine Trend: 1.28<--    Urinalysis Basic - ( 2023 19:13 )    Color: Yellow / Appearance: Clear / S.029 / pH: x  Gluc: x / Ketone: Trace  / Bili: Negative / Urobili: 3 mg/dL   Blood: x / Protein: 100 mg/dl / Nitrite: Negative   Leuk Esterase: Negative / RBC: 1 /hpf / WBC 1 /HPF   Sq Epi: x / Non Sq Epi: 1 /hpf / Bacteria: Many        Venous Blood Gas:   @ 19:45  7.20/52/38/20/50.3  VBG Lactate: 4.3      MICROBIOLOGY: personally reviewed - previous cultures neg    RADIOLOGY & ADDITIONAL TESTS:    Personally reviewed: EKG, CXR, CTH, CT Chest/Abd/Pelvis    < from: TTE with Doppler (w/Cont) (07.15.19 @ 11:44) >    EF (Visual Estimate): 40-45 %  ...  Conclusions:  1. Mitral annular calcification and calcified mitral  leaflets with normal diastolic opening.  2. Calcified trileaflet aortic valve with decreased  opening. Peak transaortic valve gradient equals 23 mm Hg,  mean transaortic valve gradient equals 12 mm Hg, aortic  valve velocity time integral equals 51 cm, consistent with  mildaortic stenosis.  3. Mild to moderate global left ventricular systolic  dysfunction with regional wall motion abnormalities and  conduction defect. Endocardial visualization enhanced with  intravenous injection of Ultrasonic Enhancing Agent  (Definity). Hypokinesis of the mid to distal anterior,  anteroseptal, and septal walls.  4. Mild diastolic dysfunction (Stage I).  5. The right ventricle is not well visualized; grossly  normal right ventricular systolic function.  *** Compared with echocardiogram of 2017, no  significant changes noted.    < end of copied text >

## 2023-02-17 NOTE — ED ADULT NURSE REASSESSMENT NOTE - NS ED NURSE REASSESS COMMENT FT1
Patient resting in stretcher, daughter at bedside providing patients history as patient is currently not speaking. patient lethargic, restless. Kussmaul breathing on 2L NC, cardiac monitoring in place. Shingles on left rib cage, scabbed over. Insulin drip in progress. Patient resting in stretcher, daughter at bedside. patient lethargic, restless. Kussmaul breathing on 2L NC, cardiac monitoring in place. Insulin drip in progress. pending admission placement

## 2023-02-17 NOTE — ED PROVIDER NOTE - PHYSICAL EXAMINATION
Gen: unable to make needs known  Head: NCAT  HEENT: EOMI, normal conjunctiva  Lung: CTAB, no respiratory distress, ?right lower lobe crackles, on 2L NC   CV: tachycardic, irregular, pulses bilaterally   Abd: soft, tender along the right region, +scabs over the RUQ  MSK: no visible bony deformities  Neuro: No focal sensory or motor deficits  Skin: Warm, well perfused, +scabs in ruq   Psych: normal affect

## 2023-02-17 NOTE — ED PROVIDER NOTE - RAPID ASSESSMENT
89F w/ PMHx of breast cancer s/p dx shingles on R side saw doctor (was given valacyckovir) presents to ED c/o increased restlessness. Last time normal was 3 days ago. Pt denies fever. Pt has been consuming fluids; consumed Gatorade today.     Patient was rapidly assessed via a rapid medical evaluation and/or role of Quick Triage Doctor; a limited history, physical exam and assessment was performed. The patient will be seen and further evaluated in the main emergency department. The remainder of care and evaluation will be conducted by the primary emergency medicine team. Receiving team will follow up on labs, imaging and serially reassess patient as indicated. All further decisions regarding patient care, evaluation and disposition are at the discretion of the receiving primary emergency department team. Seen by Pancho Morgan (MD) and Joanne AndreScribrain). 89F w/ PMHx of breast cancer s/p dx shingles on R side saw doctor (was given valacyckovir) presents to ED c/o increased restlessness. Last time normal was 3 days ago. Pt denies fever. Pt has been consuming fluids; consumed Gatorade today.     Patient was rapidly assessed via a rapid medical evaluation and/or role of Quick Triage Doctor; a limited history, physical exam and assessment was performed. The patient will be seen and further evaluated in the main emergency department. The remainder of care and evaluation will be conducted by the primary emergency medicine team. Receiving team will follow up on labs, imaging and serially reassess patient as indicated. All further decisions regarding patient care, evaluation and disposition are at the discretion of the receiving primary emergency department team. Seen by Pancho Morgan (MD) and Joanne Andre (Kindra).      Attending note (Eddie): patient tachycardic, moaning not answering questions; O2 sat 90-92% on RA per triage RN, started on supplemental O2 via NC and expedited room asignment (calledmobile charge rn).    The scribe's documentation has been prepared under my direction and personally reviewed by me in its entirety. I confirm that the note above accurately reflects all work, treatment, procedures, and medical decision making performed by me.  DIONICIO Morgan MD

## 2023-02-17 NOTE — ED PROVIDER NOTE - OBJECTIVE STATEMENT
89-year-old woman with PMH afib, HTN, HLD, DM, breast cancer 10 years ago now post chemotherapy and radiation, presenting due to 48 hours of delirium, decreased p.o. intake in the setting of new shingles that was diagnosed 3 days ago. Shingles on her right upper abdomen. patient has known A-fib currently in the 140s, has not seen a cardiologist in years.  Per daughter at bedside patient has not had any significant treatment for afib. Daughter denies falls, head trauma.

## 2023-02-17 NOTE — ED PROVIDER NOTE - CARE PLAN
1 Principal Discharge DX:	Acute delirium  Secondary Diagnosis:	Severe dehydration  Secondary Diagnosis:	Type 2 diabetes mellitus with hyperglycemia  Secondary Diagnosis:	Acute respiratory failure with hypoxia   Principal Discharge DX:	Acute delirium  Secondary Diagnosis:	Severe dehydration  Secondary Diagnosis:	Type 2 diabetes mellitus with hyperglycemia  Secondary Diagnosis:	Acute respiratory failure with hypoxia  Secondary Diagnosis:	Atrial fibrillation with RVR   Principal Discharge DX:	Acute delirium  Secondary Diagnosis:	Severe dehydration  Secondary Diagnosis:	Type 2 diabetes mellitus with hyperglycemia  Secondary Diagnosis:	Acute respiratory failure with hypoxia  Secondary Diagnosis:	Atrial fibrillation with RVR  Secondary Diagnosis:	DKA (diabetic ketoacidosis)  Secondary Diagnosis:	Non-ST elevation MI (NSTEMI)

## 2023-02-17 NOTE — H&P ADULT - NSHPPHYSICALEXAM_GEN_ALL_CORE
ICU Vital Signs Last 24 Hrs  T(C): 38 (17 Feb 2023 19:10), Max: 38 (17 Feb 2023 19:10)  T(F): 100.4 (17 Feb 2023 19:10), Max: 100.4 (17 Feb 2023 19:10)  HR: 152 (17 Feb 2023 21:31) (143 - 152)  BP: 143/115 (17 Feb 2023 21:31) (142/91 - 143/115)  BP(mean): 125 (17 Feb 2023 21:31) (125 - 125)  ABP: --  ABP(mean): --  RR: 21 (17 Feb 2023 21:31) (16 - 21)  SpO2: 95% (17 Feb 2023 21:31) (92% - 95%)    O2 Parameters below as of 17 Feb 2023 21:31  Patient On (Oxygen Delivery Method): nasal cannula  O2 Flow (L/min): 2    PHYSICAL EXAM: **to update  General: Intubated/sedated. Alert. Following commands? *****  HEENT: ET tube *****. Pupils constricted, ERRL. Extraocular movements grossly intact.***** No rhinorrhea.   Chest/Lungs: CTAB, no wheezes or crackles appreciated. Symmetric chest rise.  Heart: Regular rate and rhythm. S1/S2. No murmurs appreciated. *****.   Abdomen: Soft, nontender to palpation, all quadrants; no distension. No guarding.  : No suprapubic tenderness.   Extremities: No significant extremity edema. WWP.  Neuro: Alert, oriented.***** Sedated.**** Following commands. Moving extremities spontaneously.  Psych: Sedated, appropriate mood ***** ICU Vital Signs Last 24 Hrs  T(C): 38 (17 Feb 2023 19:10), Max: 38 (17 Feb 2023 19:10)  T(F): 100.4 (17 Feb 2023 19:10), Max: 100.4 (17 Feb 2023 19:10)  HR: 152 (17 Feb 2023 21:31) (143 - 152)  BP: 143/115 (17 Feb 2023 21:31) (142/91 - 143/115)  BP(mean): 125 (17 Feb 2023 21:31) (125 - 125)  RR: 21 (17 Feb 2023 21:31) (16 - 21)  SpO2: 95% (17 Feb 2023 21:31) (92% - 95%)    O2 Parameters below as of 17 Feb 2023 21:31  Patient On (Oxygen Delivery Method): nasal cannula  O2 Flow (L/min): 2    PHYSICAL EXAM:  Interview/exam limited by patient condition    General: Lethargic  HEENT: PERRL. No rhinorrhea.   Chest/Lungs: Anterior lung fields CTAB, except coarse crackles at bases and laterally b/l, as well as over precordium. Symmetric chest rise. Kussmaul breathing.  Heart: Rapid rate and irregular rhythm. S1/S2. No murmurs appreciated.   Abdomen: Soft, nontender to palpation, all quadrants; no distension. No guarding.  : No suprapubic tenderness. Dark yellow urine in Boyce bag.   Extremities: WWP, +2 pitting edema over shins b/l.  Skin: R abdomen, crusted lesions.   Neuro: Lethargic, not responding to voice or sternal rub.   Psych: Lethargic, not participating in exam.

## 2023-02-17 NOTE — ED ADULT NURSE NOTE - NSICDXPASTMEDICALHX_GEN_ALL_CORE_FT
EXAM:

MRI left hip



PROVIDED CLINICAL HISTORY:

Pain



COMPARISON:

None



FINDINGS:

There is patchy signal alteration on fluid sensitive sequences involving the inferior aspects of the 
medial left iliac bone adjacent to the sacroiliac joint. Regional marrow signal appears otherwise

normal.



The amount of fluid within the left hip joint appears physiologic. The acetabular labrum and femoral-
acetabular articular cartilage appear normal.



The left hip flexor, adductor, abductor and hamstring tendons appear intact.



The courses of the regional major neurovascular structures appear unremarkable.



IMPRESSION:

Marrow edema in the medial left iliac bone. Findings may reflect stress reaction or sacroiliitis.



Reported By: Jefe De Jesus 

Electronically Signed:  10/1/2019 4:41 PM
PAST MEDICAL HISTORY:  Breast cancer     Hyperlipidemia     Hypertension     Metastatic carcinoma     Pulmonary embolism 2012    Tuberculosis 1956

## 2023-02-17 NOTE — ED PROVIDER NOTE - CLINICAL SUMMARY MEDICAL DECISION MAKING FREE TEXT BOX
see MD Note       Clorazepate     Metformin    Amlodipine    - Valacyclovir (10 days) and Gabapentin (3 days) started  days ago for R flank

## 2023-02-17 NOTE — ED ADULT NURSE NOTE - OBJECTIVE STATEMENT
89 y/o female lethargic, non verbal at this time, patient arrived to ED with complaints of increasing confusion, lethargy and restlessness. Patient placed in stretcher, cardiac monitoring in place, bedside fingerstick done and glucose was >500, Kussmaul breathing noted, nasal cannula at 2 liters placed on patients.  Recently being treated for shingles, scabs noted to right rib cage.

## 2023-02-17 NOTE — H&P ADULT - ATTENDING COMMENTS
care provided 2/17/23    Patient seen and examined.  Agree with resident note as above.  Patient with hx as noted including sCHF, DM2 on metformin, recent zoster s/p valtrex and neurontin who presented to ER with worsening delirium/decreased po.  In ER patient found to have sepsis likely due to multifocal PNA, complicated by encephalopathy. DKA, PRIYANKA, lactic acidosis, NTSTEMI.  Patient admitted to MICU in that setting.    On arrival to MICU, POCUS shows moderate to severe reduction in LV function, plethoric IVC, bibasilar consolidations.  Appears that patient has acute on chronic systolic heart failure with severe sepsis due to multifocal PNA.  Will hold neurontin and benzos and monitor mental status,  Insulin gtt and serial chem for DKA.  Diuresis with lasix and trend Cr/UOP.  Check formal echo and trend troponins to peak.  Hep gtt for new fib, will also provide DVT ppx.  DNR DNI.  Daughter is surrogate decision maker.    Rest of plan as above and I have edited as appropriate.

## 2023-02-17 NOTE — ED PROVIDER NOTE - NSICDXPASTMEDICALHX_GEN_ALL_CORE_FT
PAST MEDICAL HISTORY:  Breast cancer     Hyperlipidemia     Hypertension     Metastatic carcinoma     Pulmonary embolism 2012    Tuberculosis 1956

## 2023-02-17 NOTE — H&P ADULT - ASSESSMENT
PLAN:    NEURO  - baseline A&O3, non-ambulatory  > neuro checks  > CTH: no acute  > hold home gabapentin, clorazepate    CARDIOVASCULAR  # AfibRVR  - CHADSVASC 8+  > heparin gtt  > Lopressor    # elevated troponin  - likely from demand ischemia  > trend troponin to peak  > K >4, Mg >2   > proBNP    # HFrEF  - LBBB  > TTE    # HTN  > hold home amlodipine   > trend BP    RESPIRATORY  # pna   > CXR RLL, retrocardiac opacities  > wean O2 as tolerated  > see ID section    GI/NUTRITION  # elevated LFTs  - iso sepsis  > trend CMP  > consider RUQ US    /RENAL  # UTI  - UA+  > see ID section    SKIN  ***    ID  # severe sepsis with multiorgan dysfunction  # pna - possibly from aspiration  # UTI  > UCx  > Zosyn, vanc  > MRSA/MSSA swab  > RVP  > urine Legionella Ag, urine Strep Ag    # shingles  ***    ENDOCRINE  # DKA  > insulin gtt  > BMP q4h  > FS q1h    # DM2  > hold home metformin  > A1c, lipid profile    HEMATOLOGIC  # leukocytosis iso sepsis as above  # anemia  > iron studies    DVT PPX  ***    ETHICS  > DNR/DNI PLAN:    NEURO  # altered mental status  - baseline A&O3, non-ambulatory  - iso severe sepsis with multiple organ dysfunction   > neuro checks  > CTH: no acute finding    # shingles  - s/p 5 days valacyclovir outpatient, 1 dose acyclovir on arrival  - a/w lesions crusted over, no further treatment/isolation  > hold home gabapentin iso altered mental status     PSYCH  # anxiety  - sees psychiatrist  > hold home clorazepate iso altered mental status     CARDIOVASCULAR  # AfibRVR  - CHADSVASC 8+  > Lopressor  > VTE Ppx/AC as below    # elevated troponin  - likely from demand ischemia  > trend troponin to peak  > K >4, Mg >2   > proBNP    # HFrEF  - LBBB  > TTE    # HTN  > hold home amlodipine   > trend BP    RESPIRATORY  # pna   > CXR RLL, retrocardiac opacities  > wean O2 as tolerated  > see ID section    GI/NUTRITION  # elevated LFTs  - iso sepsis  > trend CMP  > consider RUQ US    /RENAL  # UTI  - UA+  > see ID section    SKIN  # shingles as above    ID  # severe sepsis with multiple organ dysfunction   # pna - possibly from aspiration  # UTI  > UCx  > Zosyn, vanc  > MRSA/MSSA swab  > RVP  > urine Legionella Ag, urine Strep Ag      ENDOCRINE  # DKA  > insulin gtt  > BMP q4h  > FS q1h    # DM2  > hold home metformin  > A1c, lipid profile    HEMATOLOGIC  # leukocytosis iso sepsis as above  # anemia  > iron studies    VTE PPX/AC  > heparin sc   > consider heparin gtt, pending POCUS/TTE    ETHICS  > DNR/DNI PLAN:    NEURO  # altered mental status  - baseline A&O3, non-ambulatory  - iso severe sepsis with multiple organ dysfunction   > neuro checks  > CTH: no acute finding    # shingles  - s/p 5 days valacyclovir outpatient, 1 dose acyclovir on arrival  - a/w lesions crusted over, no further treatment/isolation  > hold home gabapentin iso altered mental status     PSYCH  # anxiety  - sees psychiatrist  > hold home clorazepate iso altered mental status     CARDIOVASCULAR  # AfibRVR  - CHADSVASC 8+  > Lopressor  > VTE Ppx/AC as below    # elevated troponin  - likely from demand ischemia  > trend troponin to peak  > K >4, Mg >2   > proBNP    # HFrEF  - LBBB  > TTE    # HTN  > trend BP    RESPIRATORY  # pna   > CXR RLL, retrocardiac opacities  > wean O2 as tolerated  > see ID section    GI/NUTRITION  # elevated LFTs  - iso sepsis  > trend CMP  > consider RUQ US    # PPX  > pantoprazole 40 qD    /RENAL  # UTI  - UA+  > see ID section    # Boyce - to be placed 2/18  > strict I&O    SKIN  # shingles as above    ID  # severe sepsis with multiple organ dysfunction   # pna - possibly from aspiration  # UTI  > UCx  > Zosyn, vanc  > MRSA/MSSA swab  > RVP  > urine Legionella Ag, urine Strep Ag      ENDOCRINE  # DKA  - a/w BG 500s  > insulin gtt  > BMP q4h  > FS q1h    # DM2  > hold home metformin  > A1c, lipid profile    HEMATOLOGIC  # leukocytosis iso sepsis as above  # anemia  > iron studies    VTE PPX/AC  > heparin sc q8  > consider heparin gtt, pending POCUS/TTE    ETHICS  > DNR/DNI PLAN:    NEURO  # altered mental status  - baseline A&O3, non-ambulatory  - iso severe sepsis with multiple organ dysfunction   > neuro checks  > CTH: no acute finding    # shingles  - s/p 5 days valacyclovir outpatient, 1 dose acyclovir on arrival  - a/w lesions crusted over, no further treatment/isolation  > hold home gabapentin iso altered mental status     PSYCH  # anxiety  - sees psychiatrist  > hold home clorazepate iso altered mental status     CARDIOVASCULAR  # AfibRVR  - CHADSVASC 8+  > Lopressor  > VTE Ppx/AC as below    # elevated troponin  - likely from demand ischemia  > trend troponin to peak  > K >4, Mg >2   > proBNP    # HFrEF  - LBBB  > TTE    # HTN  > trend BP    RESPIRATORY  # pna   > CXR RLL, retrocardiac opacities  > wean O2 as tolerated  > see ID section    GI/NUTRITION  # elevated LFTs  - iso sepsis  > trend CMP  > consider RUQ US    # PPX  > pantoprazole 40 qD    /RENAL  # UTI  - UA+  > see ID section    # Boyce - to be placed 2/18  > strict I&O    SKIN  # shingles as above    ID  # severe sepsis with multiple organ dysfunction   # pna - possibly from aspiration  # UTI  > UCx  > Zosyn, vanc  > MRSA/MSSA swab  > RVP  > urine Legionella Ag, urine Strep Ag      ENDOCRINE  # DKA  - a/w BG 500s  - BHB 2.4  > insulin gtt  > BMP q4h  > FS q1h    # DM2  > hold home metformin  > A1c, lipid profile    HEMATOLOGIC  # leukocytosis iso sepsis as above  # anemia  > iron studies    VTE PPX/AC  > heparin sc q8  > consider heparin gtt, pending POCUS/TTE    ETHICS  > DNR/DNI 89F DNR/DNI at baseline oriented/talkative, nonambulatory, with PMH of breast cancer (metastatic, s/p L mastectomy, lymph node dissection, chemo+RT 10 years ago), HLD, HTN, DM2, overweight, HFrEF (TTE 2019: 40-45% EF, mild/moderate global LVSD), PE 2012, TB (remote), Afib (not on treatment, has not seen cardiologist in years), recently diagnosed with shingles, brought in by daughter for 2 days of delirium and poor po intake; found to have DKA, Afib with RVR to 150s, NSTEMI, elevated LFTs, meeting criteria for severe sepsis with multiple organ dysfunction possibly from aspiration pneumonia and UTI; admitted to MICU for further management.    PLAN:    NEURO  # altered mental status  - baseline A&O3, non-ambulatory  - iso severe sepsis with multiple organ dysfunction   > neuro checks  > CTH: no acute finding    # shingles  - s/p 5 days valacyclovir outpatient, 1 dose acyclovir on arrival  - a/w lesions crusted over, no further treatment/isolation  > hold home gabapentin iso altered mental status     PSYCH  # anxiety  - sees psychiatrist  > hold home clorazepate iso altered mental status     CARDIOVASCULAR  # AfibRVR  - CHADSVASC 8+  > Lopressor  > VTE Ppx/AC as below    # elevated troponin  - likely from demand ischemia  > trend troponin to peak  > K >4, Mg >2   > proBNP  > appreciate cardiology recs    # HFrEF  - LBBB  > TTE    # HTN  > trend BP    RESPIRATORY  # pna   > CXR RLL, retrocardiac opacities  > wean O2 as tolerated  > see ID section    GI/NUTRITION  # elevated LFTs  - iso sepsis  > trend CMP  > consider RUQ US    # PPX  > pantoprazole 40 qD    /RENAL  # UTI  - UA+  > see ID section    # Boyce - to be placed 2/18  > strict I&O    SKIN  # shingles as above    ID  # severe sepsis with multiple organ dysfunction   # pna - possibly from aspiration  # UTI  > UCx  > Zosyn, vanc  > MRSA/MSSA swab  > RVP  > urine Legionella Ag, urine Strep Ag      ENDOCRINE  # DKA  - a/w BG 500s  - BHB 2.4  > insulin gtt  > BMP q4h  > FS q1h    # DM2  > hold home metformin  > A1c, lipid profile    HEMATOLOGIC  # leukocytosis iso sepsis as above  # anemia  > iron studies    VTE PPX/AC  > heparin sc q8  > consider heparin gtt, pending POCUS/TTE    ETHICS  > DNR/DNI 89F DNR/DNI at baseline oriented/talkative, nonambulatory, with PMH of breast cancer (metastatic, s/p L mastectomy, lymph node dissection, chemo+RT 10 years ago), HLD, HTN, DM2, overweight, HFrEF (TTE 2019: 40-45% EF, mild/moderate global LVSD), PE 2012, TB (remote), Afib (not on treatment, has not seen cardiologist in years), recently diagnosed with shingles, brought in by daughter for 2 days of delirium and poor po intake; found to have DKA, Afib with RVR to 150s, NSTEMI, elevated LFTs, meeting criteria for severe sepsis with multiple organ dysfunction possibly from aspiration pneumonia; admitted to MICU for further management.    PLAN:    NEURO  # altered mental status  - baseline A&O3, non-ambulatory  - iso severe sepsis with multiple organ dysfunction   > neuro checks  > CTH: no acute finding    # shingles  - s/p 5 days valacyclovir outpatient, 1 dose acyclovir on arrival  - a/w lesions crusted over, no further treatment/isolation  > hold home gabapentin iso altered mental status     PSYCH  # anxiety  - sees psychiatrist  > hold home clorazepate iso altered mental status     CARDIOVASCULAR  # AfibRVR  - CHADSVASC 8+  > Lopressor  > VTE Ppx/AC as below    # elevated troponin  - likely from demand ischemia  > trend troponin to peak  > K >4, Mg >2   > proBNP  > appreciate cardiology recs    # HFrEF  - LBBB  > TTE    # HTN  > trend BP    RESPIRATORY  # pna   > CXR RLL, retrocardiac opacities  > wean O2 as tolerated  > see ID section    GI/NUTRITION  # elevated LFTs  - iso sepsis  > trend CMP  > consider RUQ US    # PPX  > pantoprazole 40 qD    /RENAL  # UTI  - UA+  > see ID section    # Boyce - to be placed 2/18  > strict I&O    SKIN  # shingles as above    ID  # severe sepsis with multiple organ dysfunction   # pna - possibly from aspiration  # UTI  > UCx  > Zosyn, vanc  > MRSA/MSSA swab  > RVP  > urine Legionella Ag, urine Strep Ag      ENDOCRINE  # DKA  - a/w BG 500s  - BHB 2.4  > insulin gtt  > BMP q4h  > FS q1h    # DM2  > hold home metformin  > A1c, lipid profile    HEMATOLOGIC  # leukocytosis iso sepsis as above  # anemia  > iron studies    VTE PPX/AC  > heparin sc q8  > consider heparin gtt, pending POCUS/TTE    ETHICS  > DNR/DNI  -daughter is surrogate decision maker

## 2023-02-17 NOTE — ED PROVIDER NOTE - SECONDARY DIAGNOSIS.
Severe dehydration Type 2 diabetes mellitus with hyperglycemia Acute respiratory failure with hypoxia Atrial fibrillation with RVR Non-ST elevation MI (NSTEMI) DKA (diabetic ketoacidosis)

## 2023-02-17 NOTE — ED PROVIDER NOTE - PROGRESS NOTE DETAILS
Anatoly, PGY2 - MICU consulted for DKA, will come see patient, considering multi-factorial acidosis. Cardiology consulted for NSTEMI, will come see patient. [facility] PG[facility] Anatoly, PGY2 - MICU consulted for DKA, will come see patient, considering multi-factorial acidosis. Patient DNR at this time. Cardiology consulted for NSTEMI, will come see patient.

## 2023-02-18 DIAGNOSIS — E11.10 TYPE 2 DIABETES MELLITUS WITH KETOACIDOSIS WITHOUT COMA: ICD-10-CM

## 2023-02-18 DIAGNOSIS — I10 ESSENTIAL (PRIMARY) HYPERTENSION: ICD-10-CM

## 2023-02-18 DIAGNOSIS — E87.29 OTHER ACIDOSIS: ICD-10-CM

## 2023-02-18 DIAGNOSIS — R41.0 DISORIENTATION, UNSPECIFIED: ICD-10-CM

## 2023-02-18 DIAGNOSIS — E11.65 TYPE 2 DIABETES MELLITUS WITH HYPERGLYCEMIA: ICD-10-CM

## 2023-02-18 DIAGNOSIS — E78.5 HYPERLIPIDEMIA, UNSPECIFIED: ICD-10-CM

## 2023-02-18 LAB
A1C WITH ESTIMATED AVERAGE GLUCOSE RESULT: 10.2 % — HIGH (ref 4–5.6)
ALBUMIN SERPL ELPH-MCNC: 3.4 G/DL — SIGNIFICANT CHANGE UP (ref 3.3–5)
ALBUMIN SERPL ELPH-MCNC: 3.4 G/DL — SIGNIFICANT CHANGE UP (ref 3.3–5)
ALP SERPL-CCNC: 134 U/L — HIGH (ref 40–120)
ALP SERPL-CCNC: 139 U/L — HIGH (ref 40–120)
ALT FLD-CCNC: 610 U/L — HIGH (ref 10–45)
ALT FLD-CCNC: 618 U/L — HIGH (ref 10–45)
ANION GAP SERPL CALC-SCNC: 10 MMOL/L — SIGNIFICANT CHANGE UP (ref 5–17)
ANION GAP SERPL CALC-SCNC: 13 MMOL/L — SIGNIFICANT CHANGE UP (ref 5–17)
ANION GAP SERPL CALC-SCNC: 16 MMOL/L — SIGNIFICANT CHANGE UP (ref 5–17)
ANION GAP SERPL CALC-SCNC: 17 MMOL/L — SIGNIFICANT CHANGE UP (ref 5–17)
APTT BLD: 23.7 SEC — LOW (ref 27.5–35.5)
APTT BLD: 37.8 SEC — HIGH (ref 27.5–35.5)
APTT BLD: >200 SEC — CRITICAL HIGH (ref 27.5–35.5)
AST SERPL-CCNC: 543 U/L — HIGH (ref 10–40)
AST SERPL-CCNC: 631 U/L — HIGH (ref 10–40)
B-OH-BUTYR SERPL-SCNC: 0.1 MMOL/L — SIGNIFICANT CHANGE UP
BASE EXCESS BLDV CALC-SCNC: -1.6 MMOL/L — SIGNIFICANT CHANGE UP (ref -2–3)
BASE EXCESS BLDV CALC-SCNC: -9.6 MMOL/L — LOW (ref -2–3)
BILIRUB SERPL-MCNC: 0.6 MG/DL — SIGNIFICANT CHANGE UP (ref 0.2–1.2)
BILIRUB SERPL-MCNC: 0.7 MG/DL — SIGNIFICANT CHANGE UP (ref 0.2–1.2)
BUN SERPL-MCNC: 58 MG/DL — HIGH (ref 7–23)
BUN SERPL-MCNC: 61 MG/DL — HIGH (ref 7–23)
BUN SERPL-MCNC: 65 MG/DL — HIGH (ref 7–23)
BUN SERPL-MCNC: 66 MG/DL — HIGH (ref 7–23)
CA-I SERPL-SCNC: 0.89 MMOL/L — LOW (ref 1.15–1.33)
CA-I SERPL-SCNC: 1.16 MMOL/L — SIGNIFICANT CHANGE UP (ref 1.15–1.33)
CALCIUM SERPL-MCNC: 7.9 MG/DL — LOW (ref 8.4–10.5)
CALCIUM SERPL-MCNC: 8 MG/DL — LOW (ref 8.4–10.5)
CALCIUM SERPL-MCNC: 8.1 MG/DL — LOW (ref 8.4–10.5)
CALCIUM SERPL-MCNC: 8.2 MG/DL — LOW (ref 8.4–10.5)
CHLORIDE BLDV-SCNC: 107 MMOL/L — SIGNIFICANT CHANGE UP (ref 96–108)
CHLORIDE BLDV-SCNC: 84 MMOL/L — LOW (ref 96–108)
CHLORIDE SERPL-SCNC: 107 MMOL/L — SIGNIFICANT CHANGE UP (ref 96–108)
CHLORIDE SERPL-SCNC: 108 MMOL/L — SIGNIFICANT CHANGE UP (ref 96–108)
CHOLEST SERPL-MCNC: 128 MG/DL — SIGNIFICANT CHANGE UP
CHOLEST SERPL-MCNC: 139 MG/DL — SIGNIFICANT CHANGE UP
CK MB CFR SERPL CALC: 3.1 NG/ML — SIGNIFICANT CHANGE UP (ref 0–3.8)
CK MB CFR SERPL CALC: 3.3 NG/ML — SIGNIFICANT CHANGE UP (ref 0–3.8)
CK SERPL-CCNC: 70 U/L — SIGNIFICANT CHANGE UP (ref 25–170)
CK SERPL-CCNC: 91 U/L — SIGNIFICANT CHANGE UP (ref 25–170)
CO2 BLDV-SCNC: 20 MMOL/L — LOW (ref 22–26)
CO2 BLDV-SCNC: 28 MMOL/L — HIGH (ref 22–26)
CO2 SERPL-SCNC: 16 MMOL/L — LOW (ref 22–31)
CO2 SERPL-SCNC: 20 MMOL/L — LOW (ref 22–31)
CO2 SERPL-SCNC: 23 MMOL/L — SIGNIFICANT CHANGE UP (ref 22–31)
CO2 SERPL-SCNC: 25 MMOL/L — SIGNIFICANT CHANGE UP (ref 22–31)
CREAT SERPL-MCNC: 1.06 MG/DL — SIGNIFICANT CHANGE UP (ref 0.5–1.3)
CREAT SERPL-MCNC: 1.1 MG/DL — SIGNIFICANT CHANGE UP (ref 0.5–1.3)
CREAT SERPL-MCNC: 1.16 MG/DL — SIGNIFICANT CHANGE UP (ref 0.5–1.3)
CREAT SERPL-MCNC: 1.18 MG/DL — SIGNIFICANT CHANGE UP (ref 0.5–1.3)
EGFR: 43 ML/MIN/1.73M2 — LOW
EGFR: 44 ML/MIN/1.73M2 — LOW
EGFR: 47 ML/MIN/1.73M2 — LOW
EGFR: 49 ML/MIN/1.73M2 — LOW
ESTIMATED AVERAGE GLUCOSE: 246 MG/DL — HIGH (ref 68–114)
GAS PNL BLDA: SIGNIFICANT CHANGE UP
GAS PNL BLDA: SIGNIFICANT CHANGE UP
GAS PNL BLDV: 141 MMOL/L — SIGNIFICANT CHANGE UP (ref 136–145)
GAS PNL BLDV: <107 MMOL/L — CRITICAL LOW (ref 136–145)
GAS PNL BLDV: SIGNIFICANT CHANGE UP
GLUCOSE BLDC GLUCOMTR-MCNC: 188 MG/DL — HIGH (ref 70–99)
GLUCOSE BLDC GLUCOMTR-MCNC: 191 MG/DL — HIGH (ref 70–99)
GLUCOSE BLDC GLUCOMTR-MCNC: 191 MG/DL — HIGH (ref 70–99)
GLUCOSE BLDC GLUCOMTR-MCNC: 207 MG/DL — HIGH (ref 70–99)
GLUCOSE BLDC GLUCOMTR-MCNC: 229 MG/DL — HIGH (ref 70–99)
GLUCOSE BLDC GLUCOMTR-MCNC: 229 MG/DL — HIGH (ref 70–99)
GLUCOSE BLDC GLUCOMTR-MCNC: 233 MG/DL — HIGH (ref 70–99)
GLUCOSE BLDC GLUCOMTR-MCNC: 269 MG/DL — HIGH (ref 70–99)
GLUCOSE BLDC GLUCOMTR-MCNC: 320 MG/DL — HIGH (ref 70–99)
GLUCOSE BLDC GLUCOMTR-MCNC: 332 MG/DL — HIGH (ref 70–99)
GLUCOSE BLDC GLUCOMTR-MCNC: 441 MG/DL — HIGH (ref 70–99)
GLUCOSE BLDV-MCNC: 267 MG/DL — HIGH (ref 70–99)
GLUCOSE BLDV-MCNC: >650 MG/DL — CRITICAL HIGH (ref 70–99)
GLUCOSE SERPL-MCNC: 239 MG/DL — HIGH (ref 70–99)
GLUCOSE SERPL-MCNC: 255 MG/DL — HIGH (ref 70–99)
GLUCOSE SERPL-MCNC: 284 MG/DL — HIGH (ref 70–99)
GLUCOSE SERPL-MCNC: 537 MG/DL — CRITICAL HIGH (ref 70–99)
HCO3 BLDV-SCNC: 19 MMOL/L — LOW (ref 22–29)
HCO3 BLDV-SCNC: 26 MMOL/L — SIGNIFICANT CHANGE UP (ref 22–29)
HCT VFR BLD CALC: 39.6 % — SIGNIFICANT CHANGE UP (ref 34.5–45)
HCT VFR BLDA CALC: 29 % — LOW (ref 34.5–46.5)
HCT VFR BLDA CALC: 39 % — SIGNIFICANT CHANGE UP (ref 34.5–46.5)
HDLC SERPL-MCNC: 26 MG/DL — LOW
HDLC SERPL-MCNC: 33 MG/DL — LOW
HGB BLD CALC-MCNC: 13 G/DL — SIGNIFICANT CHANGE UP (ref 11.7–16.1)
HGB BLD CALC-MCNC: 9.6 G/DL — LOW (ref 11.7–16.1)
HGB BLD-MCNC: 12.1 G/DL — SIGNIFICANT CHANGE UP (ref 11.5–15.5)
HOROWITZ INDEX BLDV+IHG-RTO: 36 — SIGNIFICANT CHANGE UP
HOROWITZ INDEX BLDV+IHG-RTO: 36 — SIGNIFICANT CHANGE UP
INR BLD: 2.04 RATIO — HIGH (ref 0.88–1.16)
LACTATE BLDV-MCNC: 0.8 MMOL/L — SIGNIFICANT CHANGE UP (ref 0.5–2)
LACTATE BLDV-MCNC: 2.2 MMOL/L — HIGH (ref 0.5–2)
LACTATE SERPL-SCNC: 3 MMOL/L — HIGH (ref 0.5–2)
LIDOCAIN IGE QN: 30 U/L — SIGNIFICANT CHANGE UP (ref 7–60)
LIPID PNL WITH DIRECT LDL SERPL: 83 MG/DL — SIGNIFICANT CHANGE UP
LIPID PNL WITH DIRECT LDL SERPL: 98 MG/DL — SIGNIFICANT CHANGE UP
MAGNESIUM SERPL-MCNC: 2.2 MG/DL — SIGNIFICANT CHANGE UP (ref 1.6–2.6)
MAGNESIUM SERPL-MCNC: 2.3 MG/DL — SIGNIFICANT CHANGE UP (ref 1.6–2.6)
MAGNESIUM SERPL-MCNC: 2.3 MG/DL — SIGNIFICANT CHANGE UP (ref 1.6–2.6)
MAGNESIUM SERPL-MCNC: 2.4 MG/DL — SIGNIFICANT CHANGE UP (ref 1.6–2.6)
MCHC RBC-ENTMCNC: 27.1 PG — SIGNIFICANT CHANGE UP (ref 27–34)
MCHC RBC-ENTMCNC: 30.6 GM/DL — LOW (ref 32–36)
MCV RBC AUTO: 88.6 FL — SIGNIFICANT CHANGE UP (ref 80–100)
MRSA PCR RESULT.: SIGNIFICANT CHANGE UP
NON HDL CHOLESTEROL: 112 MG/DL — SIGNIFICANT CHANGE UP
NON HDL CHOLESTEROL: 95 MG/DL — SIGNIFICANT CHANGE UP
NRBC # BLD: 3 /100 WBCS — HIGH (ref 0–0)
PCO2 BLDV: 51 MMHG — HIGH (ref 39–42)
PCO2 BLDV: 59 MMHG — HIGH (ref 39–42)
PH BLDV: 7.17 — CRITICAL LOW (ref 7.32–7.43)
PH BLDV: 7.26 — LOW (ref 7.32–7.43)
PHOSPHATE SERPL-MCNC: 3.8 MG/DL — SIGNIFICANT CHANGE UP (ref 2.5–4.5)
PHOSPHATE SERPL-MCNC: 4.5 MG/DL — SIGNIFICANT CHANGE UP (ref 2.5–4.5)
PHOSPHATE SERPL-MCNC: 4.7 MG/DL — HIGH (ref 2.5–4.5)
PHOSPHATE SERPL-MCNC: 4.8 MG/DL — HIGH (ref 2.5–4.5)
PLATELET # BLD AUTO: 214 K/UL — SIGNIFICANT CHANGE UP (ref 150–400)
PO2 BLDV: 61 MMHG — HIGH (ref 25–45)
PO2 BLDV: 73 MMHG — HIGH (ref 25–45)
POTASSIUM BLDV-SCNC: 3.3 MMOL/L — LOW (ref 3.5–5.1)
POTASSIUM BLDV-SCNC: >10 MMOL/L — CRITICAL HIGH (ref 3.5–5.1)
POTASSIUM SERPL-MCNC: 3.6 MMOL/L — SIGNIFICANT CHANGE UP (ref 3.5–5.3)
POTASSIUM SERPL-MCNC: 4.3 MMOL/L — SIGNIFICANT CHANGE UP (ref 3.5–5.3)
POTASSIUM SERPL-MCNC: 4.7 MMOL/L — SIGNIFICANT CHANGE UP (ref 3.5–5.3)
POTASSIUM SERPL-MCNC: 5.2 MMOL/L — SIGNIFICANT CHANGE UP (ref 3.5–5.3)
POTASSIUM SERPL-SCNC: 3.6 MMOL/L — SIGNIFICANT CHANGE UP (ref 3.5–5.3)
POTASSIUM SERPL-SCNC: 4.3 MMOL/L — SIGNIFICANT CHANGE UP (ref 3.5–5.3)
POTASSIUM SERPL-SCNC: 4.7 MMOL/L — SIGNIFICANT CHANGE UP (ref 3.5–5.3)
POTASSIUM SERPL-SCNC: 5.2 MMOL/L — SIGNIFICANT CHANGE UP (ref 3.5–5.3)
PROT SERPL-MCNC: 6.2 G/DL — SIGNIFICANT CHANGE UP (ref 6–8.3)
PROT SERPL-MCNC: 6.2 G/DL — SIGNIFICANT CHANGE UP (ref 6–8.3)
PROTHROM AB SERPL-ACNC: 23.8 SEC — HIGH (ref 10.5–13.4)
RBC # BLD: 4.47 M/UL — SIGNIFICANT CHANGE UP (ref 3.8–5.2)
RBC # FLD: 15.8 % — HIGH (ref 10.3–14.5)
S AUREUS DNA NOSE QL NAA+PROBE: DETECTED
SAO2 % BLDV: 90 % — HIGH (ref 67–88)
SAO2 % BLDV: 94.2 % — HIGH (ref 67–88)
SODIUM SERPL-SCNC: 140 MMOL/L — SIGNIFICANT CHANGE UP (ref 135–145)
SODIUM SERPL-SCNC: 142 MMOL/L — SIGNIFICANT CHANGE UP (ref 135–145)
SODIUM SERPL-SCNC: 143 MMOL/L — SIGNIFICANT CHANGE UP (ref 135–145)
SODIUM SERPL-SCNC: 144 MMOL/L — SIGNIFICANT CHANGE UP (ref 135–145)
T3 SERPL-MCNC: 33 NG/DL — LOW (ref 80–200)
T4 AB SER-ACNC: 5.4 UG/DL — SIGNIFICANT CHANGE UP (ref 4.6–12)
T4 FREE SERPL-MCNC: 0.7 NG/DL — LOW (ref 0.9–1.8)
TRIGL SERPL-MCNC: 60 MG/DL — SIGNIFICANT CHANGE UP
TRIGL SERPL-MCNC: 71 MG/DL — SIGNIFICANT CHANGE UP
TROPONIN T, HIGH SENSITIVITY RESULT: 1201 NG/L — HIGH (ref 0–51)
TROPONIN T, HIGH SENSITIVITY RESULT: 911 NG/L — HIGH (ref 0–51)
TROPONIN T, HIGH SENSITIVITY RESULT: 921 NG/L — HIGH (ref 0–51)
TSH SERPL-MCNC: 3.5 UIU/ML — SIGNIFICANT CHANGE UP (ref 0.27–4.2)
WBC # BLD: 16.82 K/UL — HIGH (ref 3.8–10.5)
WBC # FLD AUTO: 16.82 K/UL — HIGH (ref 3.8–10.5)

## 2023-02-18 PROCEDURE — 96365 THER/PROPH/DIAG IV INF INIT: CPT

## 2023-02-18 PROCEDURE — 82553 CREATINE MB FRACTION: CPT

## 2023-02-18 PROCEDURE — 96361 HYDRATE IV INFUSION ADD-ON: CPT

## 2023-02-18 PROCEDURE — 80307 DRUG TEST PRSMV CHEM ANLYZR: CPT

## 2023-02-18 PROCEDURE — 83036 HEMOGLOBIN GLYCOSYLATED A1C: CPT

## 2023-02-18 PROCEDURE — 85014 HEMATOCRIT: CPT

## 2023-02-18 PROCEDURE — 84480 ASSAY TRIIODOTHYRONINE (T3): CPT

## 2023-02-18 PROCEDURE — 76604 US EXAM CHEST: CPT | Mod: 26,GC

## 2023-02-18 PROCEDURE — 83880 ASSAY OF NATRIURETIC PEPTIDE: CPT

## 2023-02-18 PROCEDURE — 76604 US EXAM CHEST: CPT | Mod: 26

## 2023-02-18 PROCEDURE — 84439 ASSAY OF FREE THYROXINE: CPT

## 2023-02-18 PROCEDURE — 81001 URINALYSIS AUTO W/SCOPE: CPT

## 2023-02-18 PROCEDURE — 84484 ASSAY OF TROPONIN QUANT: CPT

## 2023-02-18 PROCEDURE — 84145 PROCALCITONIN (PCT): CPT

## 2023-02-18 PROCEDURE — 87640 STAPH A DNA AMP PROBE: CPT

## 2023-02-18 PROCEDURE — 87040 BLOOD CULTURE FOR BACTERIA: CPT

## 2023-02-18 PROCEDURE — G0480: CPT

## 2023-02-18 PROCEDURE — 80061 LIPID PANEL: CPT

## 2023-02-18 PROCEDURE — 93005 ELECTROCARDIOGRAM TRACING: CPT

## 2023-02-18 PROCEDURE — 84100 ASSAY OF PHOSPHORUS: CPT

## 2023-02-18 PROCEDURE — 36415 COLL VENOUS BLD VENIPUNCTURE: CPT

## 2023-02-18 PROCEDURE — 83735 ASSAY OF MAGNESIUM: CPT

## 2023-02-18 PROCEDURE — 83605 ASSAY OF LACTIC ACID: CPT

## 2023-02-18 PROCEDURE — 99292 CRITICAL CARE ADDL 30 MIN: CPT

## 2023-02-18 PROCEDURE — 82550 ASSAY OF CK (CPK): CPT

## 2023-02-18 PROCEDURE — 87641 MR-STAPH DNA AMP PROBE: CPT

## 2023-02-18 PROCEDURE — 82947 ASSAY GLUCOSE BLOOD QUANT: CPT

## 2023-02-18 PROCEDURE — 84295 ASSAY OF SERUM SODIUM: CPT

## 2023-02-18 PROCEDURE — 80053 COMPREHEN METABOLIC PANEL: CPT

## 2023-02-18 PROCEDURE — 71045 X-RAY EXAM CHEST 1 VIEW: CPT

## 2023-02-18 PROCEDURE — 99291 CRITICAL CARE FIRST HOUR: CPT

## 2023-02-18 PROCEDURE — 82962 GLUCOSE BLOOD TEST: CPT

## 2023-02-18 PROCEDURE — 99223 1ST HOSP IP/OBS HIGH 75: CPT

## 2023-02-18 PROCEDURE — 85018 HEMOGLOBIN: CPT

## 2023-02-18 PROCEDURE — 99222 1ST HOSP IP/OBS MODERATE 55: CPT | Mod: GC

## 2023-02-18 PROCEDURE — 82435 ASSAY OF BLOOD CHLORIDE: CPT

## 2023-02-18 PROCEDURE — 87186 SC STD MICRODIL/AGAR DIL: CPT

## 2023-02-18 PROCEDURE — 93010 ELECTROCARDIOGRAM REPORT: CPT

## 2023-02-18 PROCEDURE — 85610 PROTHROMBIN TIME: CPT

## 2023-02-18 PROCEDURE — 87449 NOS EACH ORGANISM AG IA: CPT

## 2023-02-18 PROCEDURE — 70450 CT HEAD/BRAIN W/O DYE: CPT | Mod: MA

## 2023-02-18 PROCEDURE — 84132 ASSAY OF SERUM POTASSIUM: CPT

## 2023-02-18 PROCEDURE — 96375 TX/PRO/DX INJ NEW DRUG ADDON: CPT

## 2023-02-18 PROCEDURE — 83690 ASSAY OF LIPASE: CPT

## 2023-02-18 PROCEDURE — 93306 TTE W/DOPPLER COMPLETE: CPT | Mod: 26

## 2023-02-18 PROCEDURE — 0225U NFCT DS DNA&RNA 21 SARSCOV2: CPT

## 2023-02-18 PROCEDURE — 85730 THROMBOPLASTIN TIME PARTIAL: CPT

## 2023-02-18 PROCEDURE — 82010 KETONE BODYS QUAN: CPT

## 2023-02-18 PROCEDURE — 82330 ASSAY OF CALCIUM: CPT

## 2023-02-18 PROCEDURE — 84436 ASSAY OF TOTAL THYROXINE: CPT

## 2023-02-18 PROCEDURE — 87086 URINE CULTURE/COLONY COUNT: CPT

## 2023-02-18 PROCEDURE — 80048 BASIC METABOLIC PNL TOTAL CA: CPT

## 2023-02-18 PROCEDURE — 85027 COMPLETE CBC AUTOMATED: CPT

## 2023-02-18 PROCEDURE — 82803 BLOOD GASES ANY COMBINATION: CPT

## 2023-02-18 PROCEDURE — 93308 TTE F-UP OR LMTD: CPT | Mod: 26,GC

## 2023-02-18 PROCEDURE — 85025 COMPLETE CBC W/AUTO DIFF WBC: CPT

## 2023-02-18 PROCEDURE — C8929: CPT

## 2023-02-18 PROCEDURE — 84443 ASSAY THYROID STIM HORMONE: CPT

## 2023-02-18 RX ORDER — METOPROLOL TARTRATE 50 MG
5 TABLET ORAL ONCE
Refills: 0 | Status: COMPLETED | OUTPATIENT
Start: 2023-02-18 | End: 2023-02-18

## 2023-02-18 RX ORDER — SODIUM CHLORIDE 9 MG/ML
1000 INJECTION, SOLUTION INTRAVENOUS
Refills: 0 | Status: DISCONTINUED | OUTPATIENT
Start: 2023-02-18 | End: 2023-02-18

## 2023-02-18 RX ORDER — INSULIN LISPRO 100/ML
VIAL (ML) SUBCUTANEOUS EVERY 6 HOURS
Refills: 0 | Status: DISCONTINUED | OUTPATIENT
Start: 2023-02-18 | End: 2023-02-18

## 2023-02-18 RX ORDER — HEPARIN SODIUM 5000 [USP'U]/ML
1500 INJECTION INTRAVENOUS; SUBCUTANEOUS
Qty: 25000 | Refills: 0 | Status: DISCONTINUED | OUTPATIENT
Start: 2023-02-18 | End: 2023-02-18

## 2023-02-18 RX ORDER — INSULIN HUMAN 100 [IU]/ML
1 INJECTION, SOLUTION SUBCUTANEOUS
Qty: 100 | Refills: 0 | Status: DISCONTINUED | OUTPATIENT
Start: 2023-02-18 | End: 2023-02-18

## 2023-02-18 RX ORDER — CLORAZEPATE DIPOTASSIUM 3.75 MG
0 TABLET ORAL
Qty: 0 | Refills: 0 | DISCHARGE

## 2023-02-18 RX ORDER — DEXTROSE 50 % IN WATER 50 %
50 SYRINGE (ML) INTRAVENOUS
Refills: 0 | Status: DISCONTINUED | OUTPATIENT
Start: 2023-02-18 | End: 2023-02-18

## 2023-02-18 RX ORDER — HEPARIN SODIUM 5000 [USP'U]/ML
1300 INJECTION INTRAVENOUS; SUBCUTANEOUS
Qty: 25000 | Refills: 0 | Status: DISCONTINUED | OUTPATIENT
Start: 2023-02-18 | End: 2023-02-18

## 2023-02-18 RX ORDER — HYDROMORPHONE HYDROCHLORIDE 2 MG/ML
0.5 INJECTION INTRAMUSCULAR; INTRAVENOUS; SUBCUTANEOUS ONCE
Refills: 0 | Status: DISCONTINUED | OUTPATIENT
Start: 2023-02-18 | End: 2023-02-18

## 2023-02-18 RX ORDER — POTASSIUM CHLORIDE 20 MEQ
10 PACKET (EA) ORAL
Refills: 0 | Status: COMPLETED | OUTPATIENT
Start: 2023-02-18 | End: 2023-02-18

## 2023-02-18 RX ORDER — VANCOMYCIN HCL 1 G
1000 VIAL (EA) INTRAVENOUS EVERY 24 HOURS
Refills: 0 | Status: COMPLETED | OUTPATIENT
Start: 2023-02-18 | End: 2023-02-18

## 2023-02-18 RX ORDER — MENTHOL AND METHYL SALICYLATE 10; 30 G/100G; G/100G
1 STICK TOPICAL
Qty: 0 | Refills: 0 | DISCHARGE

## 2023-02-18 RX ORDER — METFORMIN HYDROCHLORIDE 850 MG/1
2 TABLET ORAL
Qty: 0 | Refills: 0 | DISCHARGE

## 2023-02-18 RX ORDER — METFORMIN HYDROCHLORIDE 850 MG/1
0 TABLET ORAL
Qty: 0 | Refills: 0 | DISCHARGE

## 2023-02-18 RX ORDER — GLUCAGON INJECTION, SOLUTION 0.5 MG/.1ML
1 INJECTION, SOLUTION SUBCUTANEOUS ONCE
Refills: 0 | Status: DISCONTINUED | OUTPATIENT
Start: 2023-02-18 | End: 2023-02-18

## 2023-02-18 RX ORDER — PIPERACILLIN AND TAZOBACTAM 4; .5 G/20ML; G/20ML
3.38 INJECTION, POWDER, LYOPHILIZED, FOR SOLUTION INTRAVENOUS EVERY 8 HOURS
Refills: 0 | Status: DISCONTINUED | OUTPATIENT
Start: 2023-02-18 | End: 2023-02-18

## 2023-02-18 RX ORDER — CLORAZEPATE DIPOTASSIUM 3.75 MG
1 TABLET ORAL
Qty: 0 | Refills: 0 | DISCHARGE

## 2023-02-18 RX ORDER — GABAPENTIN 400 MG/1
0 CAPSULE ORAL
Qty: 0 | Refills: 0 | DISCHARGE

## 2023-02-18 RX ORDER — METOPROLOL TARTRATE 50 MG
2.5 TABLET ORAL ONCE
Refills: 0 | Status: COMPLETED | OUTPATIENT
Start: 2023-02-18 | End: 2023-02-18

## 2023-02-18 RX ORDER — IPRATROPIUM BROMIDE 21 MCG
1 AEROSOL, SPRAY (ML) NASAL
Qty: 0 | Refills: 0 | DISCHARGE

## 2023-02-18 RX ORDER — DEXTROSE 50 % IN WATER 50 %
15 SYRINGE (ML) INTRAVENOUS ONCE
Refills: 0 | Status: DISCONTINUED | OUTPATIENT
Start: 2023-02-18 | End: 2023-02-18

## 2023-02-18 RX ORDER — AMLODIPINE BESYLATE 2.5 MG/1
0 TABLET ORAL
Qty: 0 | Refills: 0 | DISCHARGE

## 2023-02-18 RX ORDER — FUROSEMIDE 40 MG
20 TABLET ORAL ONCE
Refills: 0 | Status: COMPLETED | OUTPATIENT
Start: 2023-02-18 | End: 2023-02-18

## 2023-02-18 RX ORDER — INSULIN GLARGINE 100 [IU]/ML
10 INJECTION, SOLUTION SUBCUTANEOUS EVERY MORNING
Refills: 0 | Status: DISCONTINUED | OUTPATIENT
Start: 2023-02-18 | End: 2023-02-18

## 2023-02-18 RX ORDER — ASPIRIN/CALCIUM CARB/MAGNESIUM 324 MG
300 TABLET ORAL ONCE
Refills: 0 | Status: COMPLETED | OUTPATIENT
Start: 2023-02-18 | End: 2023-02-18

## 2023-02-18 RX ORDER — HYDROMORPHONE HYDROCHLORIDE 2 MG/ML
0.5 INJECTION INTRAMUSCULAR; INTRAVENOUS; SUBCUTANEOUS
Refills: 0 | Status: DISCONTINUED | OUTPATIENT
Start: 2023-02-18 | End: 2023-02-18

## 2023-02-18 RX ORDER — ROBINUL 0.2 MG/ML
0.4 INJECTION INTRAMUSCULAR; INTRAVENOUS EVERY 6 HOURS
Refills: 0 | Status: DISCONTINUED | OUTPATIENT
Start: 2023-02-18 | End: 2023-02-18

## 2023-02-18 RX ORDER — METOPROLOL TARTRATE 50 MG
5 TABLET ORAL EVERY 6 HOURS
Refills: 0 | Status: DISCONTINUED | OUTPATIENT
Start: 2023-02-18 | End: 2023-02-18

## 2023-02-18 RX ORDER — HYDROMORPHONE HYDROCHLORIDE 2 MG/ML
1 INJECTION INTRAMUSCULAR; INTRAVENOUS; SUBCUTANEOUS ONCE
Refills: 0 | Status: DISCONTINUED | OUTPATIENT
Start: 2023-02-18 | End: 2023-02-18

## 2023-02-18 RX ADMIN — Medication 5 MILLIGRAM(S): at 08:22

## 2023-02-18 RX ADMIN — PIPERACILLIN AND TAZOBACTAM 25 GRAM(S): 4; .5 INJECTION, POWDER, LYOPHILIZED, FOR SOLUTION INTRAVENOUS at 18:16

## 2023-02-18 RX ADMIN — HYDROMORPHONE HYDROCHLORIDE 0.5 MILLIGRAM(S): 2 INJECTION INTRAMUSCULAR; INTRAVENOUS; SUBCUTANEOUS at 15:41

## 2023-02-18 RX ADMIN — PIPERACILLIN AND TAZOBACTAM 25 GRAM(S): 4; .5 INJECTION, POWDER, LYOPHILIZED, FOR SOLUTION INTRAVENOUS at 08:14

## 2023-02-18 RX ADMIN — HYDROMORPHONE HYDROCHLORIDE 0.5 MILLIGRAM(S): 2 INJECTION INTRAMUSCULAR; INTRAVENOUS; SUBCUTANEOUS at 15:26

## 2023-02-18 RX ADMIN — Medication 0.5 MILLIGRAM(S): at 15:11

## 2023-02-18 RX ADMIN — HEPARIN SODIUM 13 UNIT(S)/HR: 5000 INJECTION INTRAVENOUS; SUBCUTANEOUS at 04:42

## 2023-02-18 RX ADMIN — CHLORHEXIDINE GLUCONATE 1 APPLICATION(S): 213 SOLUTION TOPICAL at 04:24

## 2023-02-18 RX ADMIN — Medication 2.5 MILLIGRAM(S): at 01:33

## 2023-02-18 RX ADMIN — HEPARIN SODIUM 15 UNIT(S)/HR: 5000 INJECTION INTRAVENOUS; SUBCUTANEOUS at 12:00

## 2023-02-18 RX ADMIN — SODIUM CHLORIDE 50 MILLILITER(S): 9 INJECTION, SOLUTION INTRAVENOUS at 10:53

## 2023-02-18 RX ADMIN — Medication 2: at 11:15

## 2023-02-18 RX ADMIN — ROBINUL 0.4 MILLIGRAM(S): 0.2 INJECTION INTRAMUSCULAR; INTRAVENOUS at 18:14

## 2023-02-18 RX ADMIN — Medication 20 MILLIGRAM(S): at 03:55

## 2023-02-18 RX ADMIN — Medication 250 MILLIGRAM(S): at 10:32

## 2023-02-18 RX ADMIN — HYDROMORPHONE HYDROCHLORIDE 1 MILLIGRAM(S): 2 INJECTION INTRAMUSCULAR; INTRAVENOUS; SUBCUTANEOUS at 19:10

## 2023-02-18 RX ADMIN — Medication 100 MILLIEQUIVALENT(S): at 08:14

## 2023-02-18 RX ADMIN — Medication 100 MILLIEQUIVALENT(S): at 09:29

## 2023-02-18 RX ADMIN — PANTOPRAZOLE SODIUM 40 MILLIGRAM(S): 20 TABLET, DELAYED RELEASE ORAL at 11:17

## 2023-02-18 RX ADMIN — Medication 5 MILLIGRAM(S): at 11:14

## 2023-02-18 RX ADMIN — Medication 100 MILLIEQUIVALENT(S): at 10:49

## 2023-02-18 RX ADMIN — PIPERACILLIN AND TAZOBACTAM 25 GRAM(S): 4; .5 INJECTION, POWDER, LYOPHILIZED, FOR SOLUTION INTRAVENOUS at 01:33

## 2023-02-18 RX ADMIN — INSULIN GLARGINE 10 UNIT(S): 100 INJECTION, SOLUTION SUBCUTANEOUS at 09:28

## 2023-02-18 RX ADMIN — INSULIN HUMAN 6 UNIT(S)/HR: 100 INJECTION, SOLUTION SUBCUTANEOUS at 01:32

## 2023-02-18 RX ADMIN — HYDROMORPHONE HYDROCHLORIDE 1 MILLIGRAM(S): 2 INJECTION INTRAMUSCULAR; INTRAVENOUS; SUBCUTANEOUS at 19:30

## 2023-02-18 RX ADMIN — Medication 300 MILLIGRAM(S): at 13:09

## 2023-02-18 NOTE — PROGRESS NOTE ADULT - SUBJECTIVE AND OBJECTIVE BOX
Dg Chang, PGY3  Internal Medicine  Pager [phone] ([facility])    OVERNIGHT EVENTS / SUBJECTIVE: Patient seen and examined at bedside. Admitted overnight.    OBJECTIVE:    VITAL SIGNS:  ICU Vital Signs Last 24 Hrs  T(C): 37 (2023 04:00), Max: 38 (2023 19:10)  T(F): 98.6 (2023 04:00), Max: 100.4 (2023 19:10)  HR: 122 (2023 07:00) (107 - 152)  BP: 124/63 (2023 07:00) (113/95 - 143/115)  BP(mean): 89 (2023 07:00) (88 - 125)  ABP: --  ABP(mean): --  RR: 12 (2023 07:00) (12 - 21)  SpO2: 99% (2023 07:00) (92% - 100%)    O2 Parameters below as of 2023 00:36  Patient On (Oxygen Delivery Method): nasal cannula  O2 Flow (L/min): 6     @ 07:01  -  18 @ 07:00  --------------------------------------------------------  IN: 180 mL / OUT: 1165 mL / NET: -985 mL     @ 07:01  -  18 @ 07:18  --------------------------------------------------------  IN: 1 mL / OUT: 0 mL / NET: 1 mL      CAPILLARY BLOOD GLUCOSE      POCT Blood Glucose.: 191 mg/dL (2023 06:52)      PHYSICAL EXAM:    General: NAD  HEENT: NC/AT; PERRL, clear conjunctiva  Neck: supple  Respiratory: CTA b/l, Kussmaul breathing  Cardiovascular: +S1/S2; RRR  Abdomen: soft, NT/ND; +BS x4  Extremities: WWP, 2+ peripheral pulses b/l; no LE edema  Skin: normal color and turgor; no rash  Neurological: A&Ox0    MEDICATIONS:  MEDICATIONS  (STANDING):  chlorhexidine 4% Liquid 1 Application(s) Topical <User Schedule>  dextrose 50% Injectable 50 milliLiter(s) IV Push every 15 minutes  heparin  Infusion 1300 Unit(s)/Hr (13 mL/Hr) IV Continuous <Continuous>  insulin regular Infusion 1 Unit(s)/Hr (1 mL/Hr) IV Continuous <Continuous>  pantoprazole  Injectable 40 milliGRAM(s) IV Push daily  piperacillin/tazobactam IVPB.. 3.375 Gram(s) IV Intermittent every 8 hours    MEDICATIONS  (PRN):      ALLERGIES:  Allergies    No Known Allergies    Intolerances        LABS:                        12.1   16.82 )-----------( 214      ( 2023 00:50 )             39.6     Hemoglobin: 12.1 g/dL ( @ 00:50)  Hemoglobin: 12.8 g/dL ( @ 19:11)    CBC Full  -  ( 2023 00:50 )  WBC Count : 16.82 K/uL  RBC Count : 4.47 M/uL  Hemoglobin : 12.1 g/dL  Hematocrit : 39.6 %  Platelet Count - Automated : 214 K/uL  Mean Cell Volume : 88.6 fl  Mean Cell Hemoglobin : 27.1 pg  Mean Cell Hemoglobin Concentration : 30.6 gm/dL  Auto Neutrophil # : x  Auto Lymphocyte # : x  Auto Monocyte # : x  Auto Eosinophil # : x  Auto Basophil # : x  Auto Neutrophil % : x  Auto Lymphocyte % : x  Auto Monocyte % : x  Auto Eosinophil % : x  Auto Basophil % : x        140  |  107  |  65<H>  ----------------------------<  537<HH>  4.3   |  16<L>  |  1.18    Ca    8.1<L>      2023 00:50  Phos  4.7       Mg     2.4         TPro  6.2  /  Alb  3.4  /  TBili  0.7  /  DBili  x   /  AST  631<H>  /  ALT  610<H>  /  AlkPhos  139<H>      Creatinine Trend: 1.18<--, 1.28<--  LIVER FUNCTIONS - ( 2023 00:50 )  Alb: 3.4 g/dL / Pro: 6.2 g/dL / ALK PHOS: 139 U/L / ALT: 610 U/L / AST: 631 U/L / GGT: x           PT/INR - ( 2023 01:55 )   PT: 23.8 sec;   INR: 2.04 ratio         PTT - ( 2023 01:55 )  PTT:23.7 sec    hs Troponin:                921 <<== 23 @ 00:50                974 <<== 23 @ 19:11    ABG - ( 2023 00:45 )  pH, Arterial: 7.26  pH, Blood: x     /  pCO2: 50    /  pO2: 122   / HCO3: 22    / Base Excess: -4.9  /  SaO2: 99.0                00:45 - ABG - pH: 7.26  |  pCO2: 50    |  pO2: 122   | Lactate:       | BE: -4.9   06:15 - VBG - pH: 7.26  | pCO2: 59    | pO2: 61    | Lactate: 2.2    19:45 - VBG - pH: 7.20  | pCO2: 52    | pO2: 38    | Lactate: 4.3        Urinalysis Basic - ( 2023 19:13 )    Color: Yellow / Appearance: Clear / S.029 / pH: x  Gluc: x / Ketone: Trace  / Bili: Negative / Urobili: 3 mg/dL   Blood: x / Protein: 100 mg/dl / Nitrite: Negative   Leuk Esterase: Negative / RBC: 1 /hpf / WBC 1 /HPF   Sq Epi: x / Non Sq Epi: 1 /hpf / Bacteria: Many      CSF:    EKG:   MICROBIOLOGY:    IMAGING:      Labs, imaging, EKG personally reviewed    RADIOLOGY & ADDITIONAL TESTS: Reviewed. Dg Chang, PGY3  Internal Medicine  Pager [phone] ([facility])    OVERNIGHT EVENTS / SUBJECTIVE: Patient seen and examined at bedside. Admitted overnight.    OBJECTIVE:    VITAL SIGNS:  ICU Vital Signs Last 24 Hrs  T(C): 37 (2023 04:00), Max: 38 (2023 19:10)  T(F): 98.6 (2023 04:00), Max: 100.4 (2023 19:10)  HR: 122 (2023 07:00) (107 - 152)  BP: 124/63 (2023 07:00) (113/95 - 143/115)  BP(mean): 89 (2023 07:00) (88 - 125)  ABP: --  ABP(mean): --  RR: 12 (2023 07:00) (12 - 21)  SpO2: 99% (2023 07:00) (92% - 100%)    O2 Parameters below as of 2023 00:36  Patient On (Oxygen Delivery Method): nasal cannula  O2 Flow (L/min): 6     @ 07:01  -  18 @ 07:00  --------------------------------------------------------  IN: 180 mL / OUT: 1165 mL / NET: -985 mL     @ 07:01  -  18 @ 07:18  --------------------------------------------------------  IN: 1 mL / OUT: 0 mL / NET: 1 mL      CAPILLARY BLOOD GLUCOSE      POCT Blood Glucose.: 191 mg/dL (2023 06:52)      PHYSICAL EXAM:    General: NAD  HEENT: NC/AT; PERRL, clear conjunctiva  Neck: supple  Respiratory: CTA b/l, Kussmaul breathing  Cardiovascular: irregularly irregular rhythm, rates 120-130  Abdomen: soft, NT/ND; +BS x4  Extremities: WWP, 2+ peripheral pulses b/l; no LE edema  Skin: normal color and turgor; no rash  Neurological: A&Ox0. Withdraws from pain except for in LUE    MEDICATIONS:  MEDICATIONS  (STANDING):  chlorhexidine 4% Liquid 1 Application(s) Topical <User Schedule>  dextrose 50% Injectable 50 milliLiter(s) IV Push every 15 minutes  heparin  Infusion 1300 Unit(s)/Hr (13 mL/Hr) IV Continuous <Continuous>  insulin regular Infusion 1 Unit(s)/Hr (1 mL/Hr) IV Continuous <Continuous>  pantoprazole  Injectable 40 milliGRAM(s) IV Push daily  piperacillin/tazobactam IVPB.. 3.375 Gram(s) IV Intermittent every 8 hours    MEDICATIONS  (PRN):      ALLERGIES:  Allergies    No Known Allergies    Intolerances        LABS:                        12.1   16.82 )-----------( 214      ( 2023 00:50 )             39.6     Hemoglobin: 12.1 g/dL ( @ 00:50)  Hemoglobin: 12.8 g/dL ( @ 19:11)    CBC Full  -  ( 2023 00:50 )  WBC Count : 16.82 K/uL  RBC Count : 4.47 M/uL  Hemoglobin : 12.1 g/dL  Hematocrit : 39.6 %  Platelet Count - Automated : 214 K/uL  Mean Cell Volume : 88.6 fl  Mean Cell Hemoglobin : 27.1 pg  Mean Cell Hemoglobin Concentration : 30.6 gm/dL  Auto Neutrophil # : x  Auto Lymphocyte # : x  Auto Monocyte # : x  Auto Eosinophil # : x  Auto Basophil # : x  Auto Neutrophil % : x  Auto Lymphocyte % : x  Auto Monocyte % : x  Auto Eosinophil % : x  Auto Basophil % : x        140  |  107  |  65<H>  ----------------------------<  537<HH>  4.3   |  16<L>  |  1.18    Ca    8.1<L>      2023 00:50  Phos  4.7       Mg     2.4     18    TPro  6.2  /  Alb  3.4  /  TBili  0.7  /  DBili  x   /  AST  631<H>  /  ALT  610<H>  /  AlkPhos  139<H>  02-18    Creatinine Trend: 1.18<--, 1.28<--  LIVER FUNCTIONS - ( 2023 00:50 )  Alb: 3.4 g/dL / Pro: 6.2 g/dL / ALK PHOS: 139 U/L / ALT: 610 U/L / AST: 631 U/L / GGT: x           PT/INR - ( 2023 01:55 )   PT: 23.8 sec;   INR: 2.04 ratio         PTT - ( 2023 01:55 )  PTT:23.7 sec    hs Troponin:                921 <<== 23 @ 00:50                974 <<== 23 @ 19:11    ABG - ( 2023 00:45 )  pH, Arterial: 7.26  pH, Blood: x     /  pCO2: 50    /  pO2: 122   / HCO3: 22    / Base Excess: -4.9  /  SaO2: 99.0                00:45 - ABG - pH: 7.26  |  pCO2: 50    |  pO2: 122   | Lactate:       | BE: -4.9   06:15 - VBG - pH: 7.26  | pCO2: 59    | pO2: 61    | Lactate: 2.2    19:45 - VBG - pH: 7.20  | pCO2: 52    | pO2: 38    | Lactate: 4.3        Urinalysis Basic - ( 2023 19:13 )    Color: Yellow / Appearance: Clear / S.029 / pH: x  Gluc: x / Ketone: Trace  / Bili: Negative / Urobili: 3 mg/dL   Blood: x / Protein: 100 mg/dl / Nitrite: Negative   Leuk Esterase: Negative / RBC: 1 /hpf / WBC 1 /HPF   Sq Epi: x / Non Sq Epi: 1 /hpf / Bacteria: Many      CSF:    EKG:   MICROBIOLOGY:    IMAGING:      Labs, imaging, EKG personally reviewed    RADIOLOGY & ADDITIONAL TESTS: Reviewed. Dg Chang, PGY3  Internal Medicine  Pager [phone] ([facility])    OVERNIGHT EVENTS / SUBJECTIVE: Patient seen and examined at bedside. Admitted overnight. Not alert or oriented. Poor breathing pattern.    OBJECTIVE:    VITAL SIGNS:  ICU Vital Signs Last 24 Hrs  T(C): 37 (2023 04:00), Max: 38 (2023 19:10)  T(F): 98.6 (2023 04:00), Max: 100.4 (2023 19:10)  HR: 122 (2023 07:00) (107 - 152)  BP: 124/63 (2023 07:00) (113/95 - 143/115)  BP(mean): 89 (2023 07:00) (88 - 125)  ABP: --  ABP(mean): --  RR: 12 (2023 07:00) (12 - 21)  SpO2: 99% (2023 07:00) (92% - 100%)    O2 Parameters below as of 2023 00:36  Patient On (Oxygen Delivery Method): nasal cannula  O2 Flow (L/min): 6     @ 07:  -  18 @ 07:00  --------------------------------------------------------  IN: 180 mL / OUT: 1165 mL / NET: -985 mL     @ 07:01  -  18 @ 07:18  --------------------------------------------------------  IN: 1 mL / OUT: 0 mL / NET: 1 mL      CAPILLARY BLOOD GLUCOSE      POCT Blood Glucose.: 191 mg/dL (2023 06:52)      PHYSICAL EXAM:    General: Not alert, obtunded  HEENT: NC/AT; PERRL, clear conjunctiva  Neck: supple  Respiratory: CTA b/l, Kussmaul breathing  Cardiovascular: irregularly irregular rhythm, rates 120-130  Abdomen: soft, NT/ND; +BS x4  Extremities: WWP, 2+ peripheral pulses b/l; no LE edema  Skin: normal color and turgor; no rash  Neurological: A&Ox0. Withdraws from pain     MEDICATIONS:  MEDICATIONS  (STANDING):  chlorhexidine 4% Liquid 1 Application(s) Topical <User Schedule>  dextrose 50% Injectable 50 milliLiter(s) IV Push every 15 minutes  heparin  Infusion 1300 Unit(s)/Hr (13 mL/Hr) IV Continuous <Continuous>  insulin regular Infusion 1 Unit(s)/Hr (1 mL/Hr) IV Continuous <Continuous>  pantoprazole  Injectable 40 milliGRAM(s) IV Push daily  piperacillin/tazobactam IVPB.. 3.375 Gram(s) IV Intermittent every 8 hours    MEDICATIONS  (PRN):      ALLERGIES:  Allergies    No Known Allergies    Intolerances        LABS:                        12.1   16.82 )-----------( 214      ( 2023 00:50 )             39.6     Hemoglobin: 12.1 g/dL ( @ 00:50)  Hemoglobin: 12.8 g/dL ( @ 19:11)    CBC Full  -  ( 2023 00:50 )  WBC Count : 16.82 K/uL  RBC Count : 4.47 M/uL  Hemoglobin : 12.1 g/dL  Hematocrit : 39.6 %  Platelet Count - Automated : 214 K/uL  Mean Cell Volume : 88.6 fl  Mean Cell Hemoglobin : 27.1 pg  Mean Cell Hemoglobin Concentration : 30.6 gm/dL  Auto Neutrophil # : x  Auto Lymphocyte # : x  Auto Monocyte # : x  Auto Eosinophil # : x  Auto Basophil # : x  Auto Neutrophil % : x  Auto Lymphocyte % : x  Auto Monocyte % : x  Auto Eosinophil % : x  Auto Basophil % : x        140  |  107  |  65<H>  ----------------------------<  537<HH>  4.3   |  16<L>  |  1.18    Ca    8.1<L>      2023 00:50  Phos  4.7     02-18  Mg     2.4     -18    TPro  6.2  /  Alb  3.4  /  TBili  0.7  /  DBili  x   /  AST  631<H>  /  ALT  610<H>  /  AlkPhos  139<H>      Creatinine Trend: 1.18<--, 1.28<--  LIVER FUNCTIONS - ( 2023 00:50 )  Alb: 3.4 g/dL / Pro: 6.2 g/dL / ALK PHOS: 139 U/L / ALT: 610 U/L / AST: 631 U/L / GGT: x           PT/INR - ( 2023 01:55 )   PT: 23.8 sec;   INR: 2.04 ratio         PTT - ( 2023 01:55 )  PTT:23.7 sec    hs Troponin:                921 <<== 23 @ 00:50                974 <<== 23 @ 19:11    ABG - ( 2023 00:45 )  pH, Arterial: 7.26  pH, Blood: x     /  pCO2: 50    /  pO2: 122   / HCO3: 22    / Base Excess: -4.9  /  SaO2: 99.0      00:45 - ABG - pH: 7.26  |  pCO2: 50    |  pO2: 122   | Lactate:       | BE: -4.9   06:15 - VBG - pH: 7.26  | pCO2: 59    | pO2: 61    | Lactate: 2.2    19:45 - VBG - pH: 7.20  | pCO2: 52    | pO2: 38    | Lactate: 4.3      Urinalysis Basic - ( 2023 19:13 )    Color: Yellow / Appearance: Clear / S.029 / pH: x  Gluc: x / Ketone: Trace  / Bili: Negative / Urobili: 3 mg/dL   Blood: x / Protein: 100 mg/dl / Nitrite: Negative   Leuk Esterase: Negative / RBC: 1 /hpf / WBC 1 /HPF   Sq Epi: x / Non Sq Epi: 1 /hpf / Bacteria: Many    CSF:    EKG:   MICROBIOLOGY:    IMAGING:  < from: Xray Chest 1 View- PORTABLE-Urgent (Xray Chest 1 View- PORTABLE-Urgent .) (23 @ 19:26) >  LUNGS/PLEURA: Right lower lung field and retrocardiac opacities. No   pneumothorax.    HEART AND MEDIASTINUM: The heart size is not accurately measured in this   projection.    OTHER: No acute osseous abnormalities.    IMPRESSION:    Right lower lung field and retrocardiac opacities    < end of copied text >      Labs, imaging, EKG personally reviewed    RADIOLOGY & ADDITIONAL TESTS: Reviewed.

## 2023-02-18 NOTE — CONSULT NOTE ADULT - ATTENDING COMMENTS
MICU care appreciated  Heparin gtt for now  Treat underlying cause  consider CT PA given history of PE if within goals of care    Helio
Elderly female with multiple issues. Does not appear to be in DKA. Family deciding on comfort care only.

## 2023-02-18 NOTE — PROGRESS NOTE ADULT - ASSESSMENT
89F DNR/DNI at baseline oriented/talkative, nonambulatory, with PMH of breast cancer (metastatic, s/p L mastectomy, lymph node dissection, chemo+RT 10 years ago), HLD, HTN, DM2, overweight, HFrEF (TTE 2019: 40-45% EF, mild/moderate global LVSD), PE 2012, TB (remote), Afib (not on treatment, has not seen cardiologist in years), recently diagnosed with shingles, brought in by daughter for 2 days of delirium and poor po intake; found to have DKA, Afib with RVR to 150s, NSTEMI, elevated LFTs, meeting criteria for severe sepsis with multiple organ dysfunction possibly from aspiration pneumonia; admitted to MICU for further management.    PLAN:    NEURO  # altered mental status  - baseline A&O3, non-ambulatory  - iso severe sepsis with multiple organ dysfunction   > neuro checks  > CTH: no acute finding    # shingles  - s/p 5 days valacyclovir outpatient, 1 dose acyclovir on arrival  - a/w lesions crusted over, no further treatment/isolation  > hold home gabapentin iso altered mental status     PSYCH  # anxiety  - sees psychiatrist  > hold home clorazepate iso altered mental status     CARDIOVASCULAR  # AfibRVR  - CHADSVASC 8+  > Lopressor  > VTE Ppx/AC as below    # elevated troponin  - likely from demand ischemia  > trend troponin to peak  > K >4, Mg >2   > proBNP  > appreciate cardiology recs    # HFrEF  - LBBB  > TTE    # HTN  > trend BP    RESPIRATORY  # pna   > CXR RLL, retrocardiac opacities  > wean O2 as tolerated  > see ID section    GI/NUTRITION  # elevated LFTs  - iso sepsis  > trend CMP  > consider RUQ US    # PPX  > pantoprazole 40 qD    /RENAL  # UTI  - UA+  > see ID section    # Boyce - to be placed 2/18  > strict I&O    SKIN  # shingles as above    ID  # severe sepsis with multiple organ dysfunction   # pna - possibly from aspiration  # UTI  > UCx  > Zosyn, vanc  > MRSA/MSSA swab  > RVP  > urine Legionella Ag, urine Strep Ag      ENDOCRINE  # DKA  - a/w BG 500s  - BHB 2.4  > insulin gtt  > BMP q4h  > FS q1h    # DM2  > hold home metformin  > A1c, lipid profile    HEMATOLOGIC  # leukocytosis iso sepsis as above  # anemia  > iron studies    VTE PPX/AC  > consider heparin gtt, pending POCUS/TTE    ETHICS  > DNR/DNI  -daughter is surrogate decision maker 89F DNR/DNI at baseline oriented/talkative, nonambulatory, with PMH of breast cancer (metastatic, s/p L mastectomy, lymph node dissection, chemo+RT 10 years ago), HLD, HTN, DM2, overweight, HFrEF (TTE 2019: 40-45% EF, mild/moderate global LVSD), PE 2012, TB (remote), Afib (not on treatment, has not seen cardiologist in years), recently diagnosed with shingles, brought in by daughter for 2 days of delirium and poor po intake; found to have DKA, Afib with RVR to 150s, NSTEMI, elevated LFTs, meeting criteria for severe sepsis with multiple organ dysfunction possibly from aspiration pneumonia; admitted to MICU for further management.    PLAN:    NEURO  # altered mental status  - baseline A&O3, non-ambulatory, here A&Ox0  - iso severe sepsis with multiple organ dysfunction   > neuro checks  > CTH: no acute finding    # shingles  - s/p 5 days valacyclovir outpatient, 1 dose acyclovir on arrival  - a/w lesions crusted over, no further treatment/isolation  > hold home gabapentin iso altered mental status     PSYCH  # anxiety  - sees psychiatrist  > hold home clorazepate iso altered mental status     CARDIOVASCULAR  # AfibRVR  - CHADSVASC 8+  > Lopressor 5mg q6h  > VTE Ppx/AC as below    # elevated troponin  - likely from demand ischemia  > trend troponin to peak  > K >4, Mg >2   > proBNP  > appreciate cardiology recs    # HFrEF  - LBBB  > TTE    # HTN  > trend BP    RESPIRATORY  # pna   > CXR RLL, retrocardiac opacities  > wean O2 as tolerated  > see ID section    GI/NUTRITION  # elevated LFTs  - iso sepsis  > trend CMP  > consider RUQ US    # PPX  > pantoprazole 40 qD    /RENAL  # UTI  - UA+  > see ID section    # Boyce - to be placed 2/18  > strict I&O    SKIN  # shingles as above    ID  # severe sepsis with multiple organ dysfunction   # pna - possibly from aspiration  # UTI  > UCx  > Zosyn, vanc  > MRSA/MSSA swab  > RVP  > urine Legionella Ag, urine Strep Ag      ENDOCRINE  # DKA: a/w BG 500s, BHB 2.4  > insulin gtt  > BMP q4h  > FS q1h  > Gap now closed. Will transistion with 10U lantus, and low ISS with D5 1/2ns in background    # DM2  > hold home metformin  > A1c, lipid profile    HEMATOLOGIC  # leukocytosis iso sepsis as above  # anemia  > iron studies    VTE PPX/AC  > consider heparin gtt, pending POCUS/TTE    ETHICS  > DNR/DNI  -daughter is surrogate decision maker 89F DNR/DNI at baseline oriented/talkative, nonambulatory, with PMH of breast cancer (metastatic, s/p L mastectomy, lymph node dissection, chemo+RT 10 years ago), HLD, HTN, DM2, overweight, HFrEF (TTE 2019: 40-45% EF, mild/moderate global LVSD), PE 2012, TB (remote), Afib (not on treatment, has not seen cardiologist in years), recently diagnosed with shingles, brought in by daughter for 2 days of delirium and poor po intake; found to have DKA, Afib with RVR to 150s, NSTEMI, elevated LFTs, meeting criteria for severe sepsis with multiple organ dysfunction possibly from aspiration pneumonia; admitted to MICU for further management.    PLAN:    NEURO  # altered mental status  - baseline A&O3, non-ambulatory, here A&Ox0  - iso severe sepsis with multiple organ dysfunction   > neuro checks q4h  > CTH: no acute finding    # shingles  - s/p 5 days valacyclovir outpatient, 1 dose acyclovir on arrival  - a/w lesions crusted over, no further treatment/isolation  > hold home gabapentin iso altered mental status     PSYCH  # anxiety  - sees psychiatrist  > hold home clorazepate iso altered mental status     CARDIOVASCULAR  # AfibRVR  - CHADSVASC 8+  > Lopressor 5mg q6h  > VTE Ppx/AC as below    # elevated troponin  - likely from demand ischemia  > trend troponin to peak  > K >4, Mg >2   > proBNP  > appreciate cardiology recs    # HFrEF  - LBBB  > TTE    # HTN  > trend BP    RESPIRATORY  # pna   > CXR RLL, retrocardiac opacities  > wean O2 as tolerated  > Antibiotics as below    GI/NUTRITION  # elevated LFTs  - iso sepsis  > trend CMP  > consider RUQ US    # PPX  > pantoprazole 40 qD    /RENAL  # UTI  - UA+  > see ID section    # Boyce - to be placed 2/18  > strict I&O    SKIN  # shingles as above    ID  # severe sepsis with multiple organ dysfunction   # pna - possibly from aspiration  # UTI  > UCx  > Zosyn, vanc x1  - f/u MRSA/MSSA swab  > RVP  > urine Legionella Ag, urine Strep Ag      ENDOCRINE  # DKA: a/w BG 500s, BHB 2.4  > insulin gtt  > BMP q4h  > FS q1h  > Gap now closed. Will transition with 10U lantus, and low ISS with D5 1/2ns in background    # DM2  > hold home metformin  > A1c, lipid profile    HEMATOLOGIC  # leukocytosis iso sepsis as above  # anemia  > iron studies    VTE PPX/AC  > consider heparin gtt, pending POCUS/TTE    ETHICS  > DNR/DNI  -daughter is surrogate decision maker

## 2023-02-18 NOTE — CONSULT NOTE ADULT - ASSESSMENT
89F DNR/DNI at baseline oriented/talkative, nonambulatory, with PMH of breast cancer (metastatic, s/p L mastectomy, lymph node dissection, chemo+RT 10 years ago), HLD, HTN, DM2, overweight, HFrEF (TTE 2019: 40-45% EF, mild/moderate global LVSD), PE 2012, TB (remote), Afib (not on treatment, has not seen cardiologist in years), recently diagnosed with shingles, brought in by daughter for 2 days of delirium and poor po intake; found to have DKA, Afib with RVR to 150s, NSTEMI, elevated LFTs, meeting criteria for severe sepsis with multiple organ dysfunction possibly from aspiration pneumonia; admitted to MICU for further management. Endocrine consulted for assistance with management of uncontrolled dm2 with DKA.     #Uncontrolled DM2 w/ DKA, resolving   #HAGMA - multifactorial 2/2 DKA, starvation ketosis & lactic acidosis   a1c 10.2%   per med rec, only on metformin 500mg bid at home   initial labs notable for glucose in 500s, AG 24, bicarb 12, BHB 2.4, only trace urine ketones, ph 7.2, lactate 4.3 and PCO2 52   possible that there is also a component of starvation ketosis given poor po intake x 2 days prior to admission   initial hyperglycemia may have also been partly due to stress hyperglycemia iso severe sepsis w/ multiorgan dysfunction   Recommendations:   - Lantus 10 units SC qAM - user schedule for every morning at 9AM  - Admelog low Correction Scale q6h while npo - change to tidac/hs once eating   - Currently on dextrose ivf as not eating/not awake enough to eat - Discontinue D5 IVF if patient is becoming hyperglycemic  - Goal -180  - FS Blood glucose monitoring q6h while npo - change to tidac/hs once eating   - Hypoglycemia protocol   - Carb Consistent Diet when resumed on po diet   - Nutrition consult   - Provider to RN for DM/insulin teaching for family/patient when more awake   DC Planning: Discontinue Metformin given lactic acidosis on presentation. Will require at least basal insulin on discharge, possibly basal + oral regimen vs basal/bolus. TBD closer to discharge depending on insulin requirements, renal function & clinical course. Outpatient follow up with PCP. Routine outpatient ophthalmology evaluation. Outpatient endocrinology follow up at the location provided below:     [facility]   [address] [place] [address]  [phone]    #HTN  Recommendations:   - outpt BP goal < 130/80   - defer to primary team   - outpatient annual urinary microalb/cr ratio    #HLD  Recommendations:   - LDL goal < 70   - defer to primary team   - outpatient fasting lipid profile     Daniela Li DO   Endocrine Fellow  For follow up questions, discharge recommendations, or new consults please call answering service at [phone] (weekdays), [phone] (nights/weekends). For nonurgent matters, please email [email or web address] or [email or web address]

## 2023-02-18 NOTE — PROGRESS NOTE ADULT - ATTENDING COMMENTS
Patient seen and examined.  Agree with resident note as above.  Patient with hx as noted including sCHF, DM2 on metformin, recent zoster s/p valtrex and neurontin who presented to ER with worsening delirium/decreased po.  In ER patient found to have sepsis likely due to multifocal PNA, complicated by encephalopathy. DKA, PRIYANKA, lactic acidosis, NTSTEMI.  Patient admitted to MICU in that setting.    On arrival to MICU, POCUS shows moderate to severe reduction in LV function, plethoric IVC, bibasilar consolidations.  Appears that patient has acute on chronic systolic heart failure with severe sepsis due to multifocal PNA.  Will hold neurontin and benzos and monitor mental status,  Insulin gtt and serial chem for DKA, transitioned off now.  Diuresis with lasix and trend Cr/UOP.  Check formal echo and trend troponins to peak.  Hep gtt for new fib, will also provide DVT ppx.  DNR DNI.  Daughter is surrogate decision maker. Mental status with no major improvement. Leaning towards comfort care.

## 2023-02-18 NOTE — DISCHARGE NOTE FOR THE EXPIRED PATIENT - HOSPITAL COURSE
89F DNR/DNI at baseline oriented/talkative, nonambulatory, with PMH of breast cancer (metastatic, s/p L mastectomy, lymph node dissection, chemo+RT 10 years ago), HLD, HTN, DM2, overweight, HFrEF (TTE 2019: 40-45% EF, mild/moderate global LVSD), PE , TB (remote), Afib (not on treatment, has not seen cardiologist in years), recently diagnosed with shingles, brought in by daughter for 2 days of delirium and poor po intake; found to have DKA, Afib with RVR to 150s, NSTEMI, elevated LFTs, meeting criteria for severe sepsis with multiple organ dysfunction possibly from aspiration pneumonia and UTI; admitted to MICU for further management.    Patient transitioned off of insulin gtt to lantus and ISS. Worsening respiratory statu on NC, not a candidate for non-invasive ventilation given mental status. Family contacted for further GOC, and patient was made comfort measures only with continuation of antibiotics per family preference. At  patient was pronounced  by cardiopulmonary criteria. Daughter Ivana at bedside.

## 2023-02-18 NOTE — CHART NOTE - NSCHARTNOTEFT_GEN_A_CORE
Reference #: [id number]    Others' Prescriptions  Patient Name: Fe ValenzuelaBirth Date: 09/18/1930  Address: [address] [place] [id number]: Female  Rx Written	Rx Dispensed	Drug	Quantity	Days Supply	Prescriber Name	Prescriber Robyn #	Payment Method	Dispenser  07/22/2022	08/04/2022	clorazepate 7.5 mg tablet	30	30	Justyna Herrera	[id number]	Medicare	[id number]  06/28/2022	06/28/2022	clorazepate 7.5 mg tablet	60	30	Kale Hernandez	[id number]	Medicare	[id number]  05/31/2022	05/31/2022	clorazepate 7.5 mg tablet	60	30	Pooja Tyson MD	[id number]	Medicare	[id number]  04/28/2022	04/29/2022	clorazepate 3.75 mg tablet	90	30	Justyna Herrera	[id number]	Medicare	[id number]  03/30/2022	03/31/2022	clorazepate 3.75 mg tablet	90	30	Justyna Herrera	[id number]	Medicare	[id number]

## 2023-02-18 NOTE — CHART NOTE - NSCHARTNOTEFT_GEN_A_CORE
: Latonia SANDY NP     INDICATION:    PROCEDURE:  [ x] LIMITED ECHO  [ x] LIMITED CHEST  [ ] LIMITED RETROPERITONEAL  [x ] LIMITED ABDOMINAL  [ ] LIMITED DVT  [ ] NEEDLE GUIDANCE VASCULAR  [ ] NEEDLE GUIDANCE THORACENTESIS  [ ] NEEDLE GUIDANCE PARACENTESIS  [ ] NEEDLE GUIDANCE PERICARDIOCENTESIS  [ ] OTHER    FINDINGS:  Echo   Enlarged right and left atria   RV smaller than LV   LV with reduced function (+) hypokinesis to base    Trace pericardial effusion   IVC 2.8cm     Chest   B line predominance to left anterior chest wall \  Left pleural base with small consolidation and effusion   Right anterior chest wall with A lines   some focal B lines to right pleural base with small effusion    Abd   Trace ascites noted to left  abdomina wall   Left kidney unremarkable       INTERPRETATION:  Enlarged right and left atria LV with reduced function (+) hypokinesis to base    Trace pericardial effusion   IVC 2.8cm   Right and Left pleural base consolidation and small effusions   Trace ascites

## 2023-02-18 NOTE — CHART NOTE - NSCHARTNOTEFT_GEN_A_CORE
GOC discussions held with daughter Ivana at bedside. Patient's poor clinical status discussed. DNR/DNI with no mental status with worsening respiratory acidosis on nasal cannula. Not a candidate for non-invasives given mental status. Comfort measures discussed. Daughter is amenable, other daughter is to arrive soon. Comfort orders and medications placed.     AS, pgy3 GOC discussions held with daughter Ivana at bedside. Patient's poor clinical status discussed. DNR/DNI with no mental status with worsening respiratory acidosis on nasal cannula. Not a candidate for non-invasives given mental status. Comfort measures discussed. Daughter is amenable, other daughter is to arrive soon. Comfort orders and medications placed. Family would like to continue antibiotics for now for PNA.    AS, pgy3

## 2023-02-18 NOTE — CHART NOTE - NSCHARTNOTEFT_GEN_A_CORE
: Glenny Gomes    INDICATION: critical illness    PROCEDURE:  [x] LIMITED ECHO  [x] LIMITED CHEST  [ ] LIMITED RETROPERITONEAL  [ ] LIMITED ABDOMINAL  [ ] LIMITED DVT  [ ] NEEDLE GUIDANCE VASCULAR  [ ] NEEDLE GUIDANCE THORACENTESIS  [ ] NEEDLE GUIDANCE PARACENTESIS  [ ] NEEDLE GUIDANCE PERICARDIOCENTESIS  [ ] OTHER    FINDINGS:  A line bilateral anterior lung fields  B/l pleural effusion, R>L surrounding consolidated lung   LV enlarged  RV < LV  IVC 2.3 cm    INTERPRETATION:  bilateral pleural effusions R>L  LV enlarged       Images uploaded on [employer] : Glenny Gomes    INDICATION: critical illness, hypoxemia, shock state    PROCEDURE:  [x] LIMITED ECHO  [x] LIMITED CHEST  [ ] LIMITED RETROPERITONEAL  [ ] LIMITED ABDOMINAL  [ ] LIMITED DVT  [ ] NEEDLE GUIDANCE VASCULAR  [ ] NEEDLE GUIDANCE THORACENTESIS  [ ] NEEDLE GUIDANCE PARACENTESIS  [ ] NEEDLE GUIDANCE PERICARDIOCENTESIS  [ ] OTHER    FINDINGS:  A line bilateral anterior lung fields  B/l pleural effusion, R>L surrounding consolidated lung   LV enlarged  RV < LV  IVC 2.3 cm    INTERPRETATION:  bilateral pleural effusions R>L  LV enlarged       Images uploaded on [employer]    Attending Addendum:     I was present during the entire procedure and agree with the above findings and interpretation. TIme spent on the procedure was separate from the critical care time spent caring for the patient.     Javi Nagel MD

## 2023-02-18 NOTE — CONSULT NOTE ADULT - SUBJECTIVE AND OBJECTIVE BOX
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 Per daughter patient is oriented at baseline, but for the past 2 days has been mostly in bed and nonverbal with decreased PO intake. She reportedly denied any chest pain, shortness of breath, orthopnea, PND, TORRES, or LE edema. Daughter reports that patient f/u with cardiology, no prior hx of MI or stents.    (17 Feb 2023 22:33)      PMHx:   Breast cancer    Metastatic carcinoma    Hypertension    Diabetes    Hyperlipidemia    Tuberculosis    Pulmonary embolism        PSHx:   No significant past surgical history    H/O abdominal hysterectomy    H/O left mastectomy        Allergies:  No Known Allergies      Home Meds:    Current Medications:   chlorhexidine 4% Liquid 1 Application(s) Topical <User Schedule>  heparin   Injectable 5000 Unit(s) SubCutaneous every 8 hours  insulin regular Infusion 3.65 Unit(s)/Hr IV Continuous <Continuous>  lactated ringers. 1000 milliLiter(s) IV Continuous <Continuous>  pantoprazole  Injectable 40 milliGRAM(s) IV Push daily  piperacillin/tazobactam IVPB.. 3.375 Gram(s) IV Intermittent every 8 hours      FAMILY HISTORY:  Family history of breast cancer (Child)  2nd daughter    Family history of active pulmonary tuberculosis in patient&#x27;s mother        Social History:  unable to assess    REVIEW OF SYSTEMS:    [x ] Unable to assess ROS due to encephalopathy      Physical Exam:  T(F): 100.4 (02-17), Max: 100.4 (02-17)  HR: 152 (02-17) (143 - 152)  BP: 143/115 (02-17) (142/91 - 143/115)  RR: 21 (02-17)  SpO2: 95% (02-17)  GENERAL: No acute distress, well-developed  HEAD:  Atraumatic, Normocephalic  ENT: EOMI, PERRLA, conjunctiva and sclera clear, Neck supple, No JVD, moist mucosa  CHEST/LUNG: mild crackles bilat.   HEART: tachycardic, irregular.   ABDOMEN: Soft, Nontender, Nondistended; Bowel sounds present  EXTREMITIES:  No clubbing, cyanosis, or edema  NEUROLOGY: nonverbal but awake  SKIN: Normal color, No rashes or lesions      Cardiovascular Diagnostic Testing:    ECG: Personally reviewed: afib with RVR, LBBB    Echo: Personally reviewed:  Study Date: 7/15/2019  Location: [facility]: [facility]  Study quality: Technically difficult  Referring Physician: Shira Mccoy MD  Blood Pressure: 130/70 mmHg  Height: 160 cm  Weight: 74 kg  BSA: 1.8 m2  Heart Rate: 74 mmHg  ------------------------------------------------------------------------  PROCEDURE: Transthoracic echocardiogram with 2-D, M-Mode  and complete spectral and color flow Doppler. Verbal  consent was obtained for injection of  Ultrasonic Enhancing  Agent following a discussion of risks and benefits.  Following intravenous injection of Ultrasonic Enhancing  Agent , harmonic imaging was performed.  INDICATION: Syncope and collapse (R55)  ------------------------------------------------------------------------  Dimensions:    Normal Values:  LA:     3.3    2.0 - 4.0 cm  Ao:     3.0    2.0 - 3.8 cm  SEPTUM: 1.0    0.6 - 1.2 cm  PWT:    1.0    0.6 - 1.1 cm  LVIDd:  4.2    3.0 - 5.6 cm  LVIDs:  2.9    1.8 - 4.0 cm  Derived variables:  LVMI: 77 g/m2  RWT: 0.47  Fractional short: 31 %  EF (Visual Estimate): 40-45 %  Doppler Peak Velocity (m/sec): AoV=2.4  ------------------------------------------------------------------------  Observations:  Mitral Valve: Mitral annular calcification and calcified  mitral leaflets with normal diastolic opening.  Aortic Valve/Aorta: Calcified trileaflet aortic valve with  decreased opening. Peak transaortic valve gradient equals  23 mm Hg, mean transaortic valve gradient equals 12 mm Hg,  aortic valve velocity time integral equals 51 cm,  consistent with mild aortic stenosis. Peak left ventricular  outflow tract gradient equals 3 mm Hg, mean gradient is  equal to 1 mm Hg, LVOT velocity time integral equals 17 cm.  Aortic Root: 3 cm.  LVOT diameter: 2.2 cm.  Left Atrium: Normal left atrium.  LA volume index = 34  cc/m2.  Left Ventricle: Mild to moderate global left ventricular  systolic dysfunction with regional wallmotion  abnormalities and conduction defect. Endocardial  visualization enhanced with intravenous injection of  Ultrasonic Enhancing Agent (Definity). Hypokinesis of the  mid to distal anterior, anteroseptal, and septal walls.  Mild diastolic dysfunction (Stage I).  Right Heart: The right ventricle is not well visualized;  grossly normal right ventricular systolic function.  Tricuspid valve not well visualized. Minimal tricuspid  regurgitation. Pulmonic valve not well visualized. Mild  pulmonic regurgitation.  Pericardium/Pleura: Normal pericardium with no pericardial  effusion.  ------------------------------------------------------------------------  Conclusions:  1. Mitral annular calcification and calcified mitral  leaflets with normal diastolic opening.  2. Calcified trileaflet aortic valve with decreased  opening. Peak transaortic valve gradient equals 23 mm Hg,  mean transaortic valve gradient equals 12 mm Hg, aortic  valve velocity time integral equals 51 cm, consistent with  mildaortic stenosis.  3. Mild to moderate global left ventricular systolic  dysfunction with regional wall motion abnormalities and  conduction defect. Endocardial visualization enhanced with  intravenous injection of Ultrasonic Enhancing Agent  (Definity). Hypokinesis of the mid to distal anterior,  anteroseptal, and septal walls.  4. Mild diastolic dysfunction (Stage I).  5. The right ventricle is not well visualized; grossly  normal right ventricular systolic function.  *** Compared with echocardiogram of 9/26/2017, no  significant changes noted.      CXR: Personally reviewed    Labs: Personally reviewed                        12.8   16.35 )-----------( 261      ( 17 Feb 2023 19:11 )             41.9     02-17    139  |  103  |  66<H>  ----------------------------<  582<HH>  5.1   |  12<L>  |  1.28    Ca    8.5      17 Feb 2023 19:11  Mg     2.5     02-17    TPro  6.6  /  Alb  3.6  /  TBili  1.2  /  DBili  x   /  AST  642<H>  /  ALT  579<H>  /  AlkPhos  148<H>  02-17      Serum Pro-Brain Natriuretic Peptide: [id number] pg/mL (02-17 @ 19:11)        
ENDOCRINE INITIAL CONSULT -     HPI:  89F DNR/DNI at baseline oriented/talkative, nonambulatory, with PMH of breast cancer (metastatic, s/p L mastectomy, lymph node dissection, chemo+RT 10 years ago), HLD, HTN, DM2, overweight, HFrEF (TTE 2019: 40-45% EF, mild/moderate global LVSD), PE 2012, TB (remote), Afib (not on treatment, has not seen cardiologist in years), recently diagnosed with shingles on RUQ 1/28 prior to admission (treated with valacyclovir *5 days, gabapentin), brought in by daughter for 2 days of delirium and poor po intake; found to have DKA, Afib with RVR to 150s, NSTEMI, elevated LFTs, possible pneumonia, UTI, meeting criteria for severe sepsis with multiple organ dysfunction; admitted to MICU for further management.    In ED, T 100.4  , received LR bolus and maintenance, acyclovir, Zosyn, insulin gtt,  CXR +RLL and retrocardiac opacities, CTH no acute finding. (17 Feb 2023 22:33)      ENDOCRINE HISTORY   Patient seen in MICU this morning, received Lantus 10 units earlier to transition of insulin ggt. Insulin ggt has been running at 1 unit/hr for the last several hours per RN. Also on dextrose IVF as patient still very lethargic, not awake enough to eat yet.   Unable to obtain hx from patient, from chart review, patient w/ DM2 as well as HFrEF, jimmy in by daughter due to 2 days of delirium & poor po intake. Found to be hyperglycemic in DKA with concern for possible aspiration pna, uti and severe sepsis. Patient was also in afib/rvr and with concern for NSTEMI.     PAST MEDICAL & SURGICAL HISTORY:  Breast cancer      Metastatic carcinoma      Hypertension      Hyperlipidemia      Tuberculosis  1956      Pulmonary embolism  2012      H/O abdominal hysterectomy      H/O left mastectomy          FAMILY HISTORY:  Family history of breast cancer (Child)  2nd daughter    Family history of active pulmonary tuberculosis in patient&#x27;s mother        Social History: unable to obtain       Home Medications:  CLORAZ DIPOT 7.5MG TAB: 1 tab(s) orally 1 to 2 times a day (18 Feb 2023 00:05)  GABAPENTIN 100 MG CAPSULE: TAKE 1 CAPSULE BY MOUTH TWICE A DAY (18 Feb 2023 00:05)  metFORMIN 500 mg oral tablet: 2 tab(s) orally 2 times a day (18 Feb 2023 00:00)      MEDICATIONS  (STANDING):  aspirin Suppository 300 milliGRAM(s) Rectal once  chlorhexidine 4% Liquid 1 Application(s) Topical <User Schedule>  dextrose 5%. 1000 milliLiter(s) (50 mL/Hr) IV Continuous <Continuous>  dextrose 5%. 1000 milliLiter(s) (100 mL/Hr) IV Continuous <Continuous>  glucagon  Injectable 1 milliGRAM(s) IntraMuscular once  heparin  Infusion 1500 Unit(s)/Hr (15 mL/Hr) IV Continuous <Continuous>  insulin glargine Injectable (LANTUS) 10 Unit(s) SubCutaneous every morning  insulin lispro (ADMELOG) corrective regimen sliding scale   SubCutaneous every 6 hours  metoprolol tartrate Injectable 5 milliGRAM(s) IV Push every 6 hours  pantoprazole  Injectable 40 milliGRAM(s) IV Push daily  piperacillin/tazobactam IVPB.. 3.375 Gram(s) IV Intermittent every 8 hours    MEDICATIONS  (PRN):  dextrose Oral Gel 15 Gram(s) Oral once PRN Blood Glucose LESS THAN 70 milliGRAM(s)/deciliter      Allergies    No Known Allergies    Intolerances        REVIEW OF SYSTEMS  UNABLE TO OBTAIN     PHYSICAL EXAM   Vital Signs Last 24 Hrs  T(C): 37.4 (18 Feb 2023 12:00), Max: 38 (17 Feb 2023 19:10)  T(F): 99.3 (18 Feb 2023 12:00), Max: 100.4 (17 Feb 2023 19:10)  HR: 123 (18 Feb 2023 12:00) (107 - 152)  BP: 110/73 (18 Feb 2023 12:00) (110/73 - 143/115)  BP(mean): 83 (18 Feb 2023 12:00) (83 - 125)  RR: 14 (18 Feb 2023 12:00) (12 - 25)  SpO2: 96% (18 Feb 2023 12:00) (92% - 100%)    Parameters below as of 18 Feb 2023 12:00  Patient On (Oxygen Delivery Method): nasal cannula  O2 Flow (L/min): 4    GENERAL: NAD, lethargic   EYES: No proptosis, no lid lag, anicteric  HEENT:  Atraumatic, Normocephalic, moist mucous membranes  THYROID: Normal size, no palpable nodules  RESPIRATORY: Clear to auscultation bilaterally; No rales, rhonchi, wheezing  CARDIOVASCULAR: tachycardic, no peripheral edema  GI: Soft, mildly distended, normal bowel sounds  SKIN: Dry, intact, +shingles   MUSCULOSKELETAL: Full range of motion   NEURO: unable to assess, patient lethargic/asleep  PSYCH: lethargic  CUSHING'S SIGNS: no striae    CAPILLARY BLOOD GLUCOSE      POCT Blood Glucose.: 233 mg/dL (18 Feb 2023 12:15)  POCT Blood Glucose.: 229 mg/dL (18 Feb 2023 11:13)  POCT Blood Glucose.: 207 mg/dL (18 Feb 2023 10:10)   POCT Blood Glucose.: 188 mg/dL (18 Feb 2023 08:54) 10L  POCT Blood Glucose.: 191 mg/dL (18 Feb 2023 08:03)  POCT Blood Glucose.: 191 mg/dL (18 Feb 2023 06:52)  POCT Blood Glucose.: 229 mg/dL (18 Feb 2023 05:59)  POCT Blood Glucose.: 269 mg/dL (18 Feb 2023 05:33)  POCT Blood Glucose.: 332 mg/dL (18 Feb 2023 04:01)  POCT Blood Glucose.: 320 mg/dL (18 Feb 2023 03:05)  POCT Blood Glucose.: 441 mg/dL (18 Feb 2023 02:10)  POCT Blood Glucose.: 469 mg/dL (17 Feb 2023 23:30)  POCT Blood Glucose.: 493 mg/dL (17 Feb 2023 22:06)  POCT Blood Glucose.: 479 mg/dL (17 Feb 2023 22:05)  POCT Blood Glucose.: 491 mg/dL (17 Feb 2023 21:17)  POCT Blood Glucose.: 531 mg/dL (17 Feb 2023 18:39)  POCT Blood Glucose.: 533 mg/dL (17 Feb 2023 18:38)    eMAR:  insulin glargine Injectable (LANTUS)   10 Unit(s) SubCutaneous (02-18-23 @ 09:28)    insulin lispro (ADMELOG) corrective regimen sliding scale   2 Unit(s) SubCutaneous (02-18-23 @ 11:15)    insulin regular Infusion   6 mL/Hr IV Continuous (02-18-23 @ 01:09)    insulin regular Infusion   3.65 mL/Hr IV Continuous (02-17-23 @ 21:37)        A1C with Estimated Average Glucose Result: 10.2 % (02-17-23 @ 19:11)                            12.1   16.82 )-----------( 214      ( 18 Feb 2023 00:50 )             39.6       02-18    144  |  108  |  61<H>  ----------------------------<  239<H>  4.7   |  23  |  1.06    Ca    7.9<L>      18 Feb 2023 10:29  Phos  4.5     02-18  Mg     2.3     02-18    TPro  6.2  /  Alb  3.4  /  TBili  0.6  /  DBili  x   /  AST  543<H>  /  ALT  618<H>  /  AlkPhos  134<H>  02-18      Thyroid Stimulating Hormone, Serum: 3.50 uIU/mL (02-17-23 @ 19:11)      LIPIDS    RADIOLOGY

## 2023-02-18 NOTE — CONSULT NOTE ADULT - ASSESSMENT
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   -HStrops: 972--> trend to peak  -CK/CKMB?  -EKG: afib with RVR  -Obtain TTE  -Monitor on telemetry  -would hold off on ACS treatment at this time  -Start Beta Blocker as below  -Obtain Lipids, A1c, TSH/FT4  -Cardiology will follow    #Afib with RVR  -Start on Beta blocker, goal HR <110 if asymptomatic and <80 if symptomatic  -pain control  -Pt not on AC on admission, risk benefit discussion.  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   -HStrops: 972--> trend to peak  -CK/CKMB?  -EKG: afib with RVR  -Obtain TTE  -Monitor on telemetry  -start heparin gtt and aspirin for now  -Start Beta Blocker as below  -Would obtain CTA to r/o PE.   -Obtain Lipids, A1c, TSH/FT4  -Cardiology will follow    #Afib with RVR  -Start on Beta blocker, goal HR <110 if asymptomatic and <80 if symptomatic  -pain control  -started on AC, risk vs benefit of AC moving forward.     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   -HStrops: 972--> trend to peak  -CK/CKMB?  -EKG: afib with RVR  -Obtain TTE  -Monitor on telemetry  -start heparin gtt and aspirin for now  -Start Beta Blocker as below  -Would obtain CTA to r/o PE.   -Obtain TTE  -Obtain Lipids, A1c, TSH/FT4  -Cardiology will follow    #Afib with RVR  -Start on Beta blocker, goal HR <110 if asymptomatic and <80 if symptomatic  -pain control  -started on AC, risk vs benefit of AC moving forward.     Ovidio Goss, Cardiology Fellow, F-1    For all New Consults and Questions:  [email or web address]   Login: [email or web address]    *** Note not final until signed by attending

## 2023-02-19 LAB — LEGIONELLA AG UR QL: NEGATIVE — SIGNIFICANT CHANGE UP

## 2023-02-22 LAB
ALPRAZOLAM RESULT, UR: NEGATIVE — SIGNIFICANT CHANGE UP
AMPHET UR-MCNC: NEGATIVE NG/ML — SIGNIFICANT CHANGE UP
BARBITURATES UR QL SCN: NEGATIVE NG/ML — SIGNIFICANT CHANGE UP
BARBITURATES UR-MCNC: NEGATIVE NG/ML — SIGNIFICANT CHANGE UP
BENZODIAZ UR QL SCN: POSITIVE NG/ML
BENZODIAZ UR-MCNC: SIGNIFICANT CHANGE UP NG/ML
BENZODIAZEPINES RESULT, UR: POSITIVE NG/ML
CLONAZEPAM RESULT, UR: NEGATIVE — SIGNIFICANT CHANGE UP
COCAINE METAB.OTHER UR-MCNC: NEGATIVE NG/ML — SIGNIFICANT CHANGE UP
CREATININE, URINE THERAPEUTIC: 77 MG/DL — SIGNIFICANT CHANGE UP (ref 20–300)
FENTANYL RESULT, UR: NEGATIVE NG/ML — SIGNIFICANT CHANGE UP
FENTANYL UR QL SCN: NEGATIVE NG/ML — SIGNIFICANT CHANGE UP
FLURAZEPAM RESULT, UR: NEGATIVE — SIGNIFICANT CHANGE UP
LORAZEPAM RESULT, UR: NEGATIVE — SIGNIFICANT CHANGE UP
LORAZEPAM UR CFM-MCNC: NEGATIVE — SIGNIFICANT CHANGE UP
Lab: SIGNIFICANT CHANGE UP
METHADONE UR-MCNC: NEGATIVE NG/ML — SIGNIFICANT CHANGE UP
MIDAZOLAM RESULT, UR: NEGATIVE — SIGNIFICANT CHANGE UP
NORDIAZEPAM RESULT, UR: NEGATIVE — SIGNIFICANT CHANGE UP
OPIATES UR-MCNC: NEGATIVE NG/ML — SIGNIFICANT CHANGE UP
OXAZEPAM RESULT, UR: POSITIVE
OXAZEPAM UR CFM-MCNC: 594 NG/ML — SIGNIFICANT CHANGE UP
OXAZEPAM UR QL SCN: POSITIVE
OXYCODONE UR QL SCN: NEGATIVE NG/ML — SIGNIFICANT CHANGE UP
PCP UR-MCNC: NEGATIVE NG/ML — SIGNIFICANT CHANGE UP
PH, URINE RESULT: 5.7 — SIGNIFICANT CHANGE UP (ref 4.5–8.9)
TEMAZEPAM RESULT, UR: NEGATIVE — SIGNIFICANT CHANGE UP
THC UR QL: NEGATIVE NG/ML — SIGNIFICANT CHANGE UP
TRIAZOLAM RESULT, UR: NEGATIVE — SIGNIFICANT CHANGE UP

## 2023-04-07 NOTE — ED PROVIDER NOTE - CROS ED EYES ALL NEG
Patient calling to ask about test results from liver ultrasound and mammogram from 4/3/2023. Informed patient that Dr. Navarro commented on liver US and relayed the following message:          Patient verbalized understanding and had no further questions. Transferred to scheduling for follow-up lab appointment.      Christelle Lofton RN  04/07/23 9:40 AM  [facility] Nurse Advisor  
negative...

## 2023-06-15 NOTE — ED ADULT NURSE NOTE - CADM POA PRESS ULCER
Drysol Pregnancy And Lactation Text: This medication is considered safe during pregnancy and breast feeding. No

## 2024-07-24 NOTE — PROGRESS NOTE ADULT - PROBLEM SELECTOR PROBLEM 6
Caller: MELINDA ALBARRAN    Relationship: W/ [employer]    Best call back number: [phone]    What test was performed: LEVETIRACETAM LAB    When was the test performed: 6/28/2024    Where was the test performed: [facility]    Additional notes: MELINDA STATES THEY SAW IN PT'S MYCHART PORTAL THAT LEVETIRACETAM LEVELS WERE ELEVATED. SHE WOULD LIKE TO KNOW IF ELEVATED LEVETIRACETAM LEVELS COULD CAUSE AGGRESSION.    PLEASE REVIEW AND ADVISE.   Acute cystitis without hematuria

## 2025-04-08 NOTE — ED ADULT NURSE NOTE - GASTROINTESTINAL WDL
Called and spoke with patient. Patient states he was feeling somewhat better yesterday with O2 getting up to 96% at times. Patient reports going to get groceries with daughter. Patient states O2 is back down to 90-93% this morning. Writer advised patient of providers recommendation to be seen in ER.  Patient concerned with having to walk across house and down the stairs to unlock his door with his shortness of breath. Writer advised patient to call an ambulance if his is having difficulty walking due to shortness of breath. Patient refused multiple times. Write also offered to call ambulance or family member for patient. Patient again declined. Writer asked patient if any family would be able to come over to bring him. Patient states he was going to [facility], unlock door and call daughter. Writer advised patient if symptoms worsen at all to call the ambulance.   Abdomen soft, nontender, nondistended, bowel sounds present in all 4 quadrants.